# Patient Record
Sex: MALE | Race: WHITE | NOT HISPANIC OR LATINO | Employment: OTHER | URBAN - METROPOLITAN AREA
[De-identification: names, ages, dates, MRNs, and addresses within clinical notes are randomized per-mention and may not be internally consistent; named-entity substitution may affect disease eponyms.]

---

## 2017-01-16 RX ORDER — ROSUVASTATIN CALCIUM 20 MG/1
TABLET, FILM COATED ORAL
Qty: 30 TABLET | Refills: 0 | Status: SHIPPED | OUTPATIENT
Start: 2017-01-16 | End: 2017-02-14 | Stop reason: SDUPTHER

## 2017-01-28 RX ORDER — OMEPRAZOLE 20 MG/1
CAPSULE, DELAYED RELEASE ORAL
Qty: 30 CAPSULE | Refills: 3 | Status: SHIPPED | OUTPATIENT
Start: 2017-01-28 | End: 2017-09-14 | Stop reason: SDUPTHER

## 2017-02-02 ENCOUNTER — OFFICE VISIT (OUTPATIENT)
Dept: FAMILY MEDICINE CLINIC | Facility: CLINIC | Age: 57
End: 2017-02-02

## 2017-02-02 VITALS
HEIGHT: 73 IN | DIASTOLIC BLOOD PRESSURE: 80 MMHG | HEART RATE: 53 BPM | RESPIRATION RATE: 14 BRPM | SYSTOLIC BLOOD PRESSURE: 122 MMHG | WEIGHT: 176 LBS | OXYGEN SATURATION: 100 % | TEMPERATURE: 97.9 F | BODY MASS INDEX: 23.33 KG/M2

## 2017-02-02 DIAGNOSIS — Z13.9 SCREENING: ICD-10-CM

## 2017-02-02 DIAGNOSIS — K21.00 GASTROESOPHAGEAL REFLUX DISEASE WITH ESOPHAGITIS: ICD-10-CM

## 2017-02-02 DIAGNOSIS — Z12.11 ENCOUNTER FOR SCREENING COLONOSCOPY: ICD-10-CM

## 2017-02-02 DIAGNOSIS — K92.2 LOWER GI BLEED: ICD-10-CM

## 2017-02-02 DIAGNOSIS — E78.2 MIXED HYPERLIPIDEMIA: ICD-10-CM

## 2017-02-02 DIAGNOSIS — I10 ESSENTIAL HYPERTENSION: Primary | ICD-10-CM

## 2017-02-02 LAB
ALBUMIN SERPL-MCNC: 4.5 G/DL (ref 3.5–5.2)
ALBUMIN/GLOB SERPL: 1.8 G/DL
ALP SERPL-CCNC: 64 U/L (ref 39–117)
ALT SERPL W P-5'-P-CCNC: 25 U/L (ref 1–41)
ANION GAP SERPL CALCULATED.3IONS-SCNC: 12.7 MMOL/L
AST SERPL-CCNC: 22 U/L (ref 1–40)
BILIRUB SERPL-MCNC: 0.4 MG/DL (ref 0.1–1.2)
BUN BLD-MCNC: 22 MG/DL (ref 6–20)
BUN/CREAT SERPL: 30.1 (ref 7–25)
CALCIUM SPEC-SCNC: 9.3 MG/DL (ref 8.6–10.5)
CHLORIDE SERPL-SCNC: 102 MMOL/L (ref 98–107)
CHOLEST SERPL-MCNC: 133 MG/DL (ref 0–200)
CO2 SERPL-SCNC: 25.3 MMOL/L (ref 22–29)
CREAT BLD-MCNC: 0.73 MG/DL (ref 0.76–1.27)
ERYTHROCYTE [DISTWIDTH] IN BLOOD BY AUTOMATED COUNT: 12.6 % (ref 4.5–15)
GFR SERPL CREATININE-BSD FRML MDRD: 110 ML/MIN/1.73
GLOBULIN UR ELPH-MCNC: 2.5 GM/DL
GLUCOSE BLD-MCNC: 107 MG/DL (ref 65–99)
HCT VFR BLD AUTO: 42.2 % (ref 35–60)
HCV AB SER DONR QL: NORMAL
HDLC SERPL-MCNC: 59 MG/DL (ref 40–60)
HGB BLD-MCNC: 14.2 G/DL (ref 13.5–18)
LDLC SERPL CALC-MCNC: 64 MG/DL (ref 0–100)
LDLC/HDLC SERPL: 1.09 {RATIO}
LYMPHOCYTES # BLD AUTO: 2 10*3/MM3 (ref 1.2–3.4)
LYMPHOCYTES NFR BLD AUTO: 30.6 % (ref 21–51)
MCH RBC QN AUTO: 32.8 PG (ref 26.1–33.1)
MCHC RBC AUTO-ENTMCNC: 33.7 G/DL (ref 33–37)
MCV RBC AUTO: 97.5 FL (ref 80–99)
MONOCYTES # BLD AUTO: 0.6 10*3/MM3 (ref 0.1–0.6)
MONOCYTES NFR BLD AUTO: 8.7 % (ref 2–9)
NEUTROPHILS # BLD AUTO: 3.9 10*3/MM3 (ref 1.4–6.5)
NEUTROPHILS NFR BLD AUTO: 60.7 % (ref 42–75)
PLATELET # BLD AUTO: 172 10*3/MM3 (ref 150–450)
PMV BLD AUTO: 8 FL (ref 7.1–10.5)
POTASSIUM BLD-SCNC: 4.6 MMOL/L (ref 3.5–5.2)
PROT SERPL-MCNC: 7 G/DL (ref 6–8.5)
RBC # BLD AUTO: 4.33 10*6/MM3 (ref 4–6)
SODIUM BLD-SCNC: 140 MMOL/L (ref 136–145)
TRIGL SERPL-MCNC: 49 MG/DL (ref 0–150)
VLDLC SERPL-MCNC: 9.8 MG/DL (ref 5–40)
WBC NRBC COR # BLD: 6.5 10*3/MM3 (ref 4.5–10)

## 2017-02-02 PROCEDURE — 99214 OFFICE O/P EST MOD 30 MIN: CPT | Performed by: INTERNAL MEDICINE

## 2017-02-02 PROCEDURE — 85025 COMPLETE CBC W/AUTO DIFF WBC: CPT | Performed by: INTERNAL MEDICINE

## 2017-02-02 PROCEDURE — 86803 HEPATITIS C AB TEST: CPT | Performed by: INTERNAL MEDICINE

## 2017-02-02 PROCEDURE — 80053 COMPREHEN METABOLIC PANEL: CPT | Performed by: INTERNAL MEDICINE

## 2017-02-02 PROCEDURE — 80061 LIPID PANEL: CPT | Performed by: INTERNAL MEDICINE

## 2017-02-02 RX ORDER — LISINOPRIL 20 MG/1
20 TABLET ORAL DAILY
Qty: 90 TABLET | Refills: 1
Start: 2017-02-02 | End: 2018-04-17 | Stop reason: SDUPTHER

## 2017-02-02 NOTE — PROGRESS NOTES
Subjective   Elia Walsh is a 56 y.o. male.     History of Present Illness   Patient was then for high blood pressure blood pressures been running 120s over 80s at home.  His lipid status remained stable medication and he needs a colonoscopy he does have episodic lower GI bleed and patient was set up with a surgeon for colonoscopy.  He had his labs drawn and will follow-up in one week.    Much of this encounter note is an electronic transcription/translation of spoken language to printed text.  The electronic translation of spoken language may permit erroneous, or at times, nonsensical words or phrases to be inadvertently transcribed.  Although I have reviewed the note for such errors, some may still exist.  The following portions of the patient's history were reviewed and updated as appropriate: allergies, current medications, past family history, past medical history, past social history, past surgical history and problem list.    Review of Systems   Constitutional: Negative for fatigue and fever.   HENT: Positive for congestion. Negative for trouble swallowing.    Eyes: Negative for discharge and visual disturbance.   Respiratory: Negative for choking and shortness of breath.    Cardiovascular: Negative for chest pain and palpitations.   Gastrointestinal: Negative for abdominal pain and blood in stool.   Endocrine: Negative.    Genitourinary: Negative for genital sores and hematuria.   Musculoskeletal: Negative for gait problem and joint swelling.   Skin: Negative for color change, pallor, rash and wound.   Allergic/Immunologic: Positive for environmental allergies. Negative for immunocompromised state.   Neurological: Negative for facial asymmetry and speech difficulty.   Psychiatric/Behavioral: Negative for hallucinations and suicidal ideas.       Objective   Physical Exam   Constitutional: He is oriented to person, place, and time. He appears well-developed and well-nourished.   HENT:   Head: Normocephalic.    Eyes: Conjunctivae are normal. Pupils are equal, round, and reactive to light.   Neck: Normal range of motion. Neck supple.   Cardiovascular: Normal rate, regular rhythm and normal heart sounds.    Pulmonary/Chest: Effort normal and breath sounds normal.   Abdominal: Soft. Bowel sounds are normal.   Musculoskeletal: Normal range of motion.   Neurological: He is alert and oriented to person, place, and time.   Skin: Skin is warm and dry.   Psychiatric: He has a normal mood and affect. His behavior is normal. Judgment and thought content normal.   Nursing note and vitals reviewed.      Assessment/Plan   Problems Addressed this Visit        Cardiovascular and Mediastinum    Essential hypertension - Primary    Relevant Medications    lisinopril (PRINIVIL,ZESTRIL) 20 MG tablet    Other Relevant Orders    CBC & Differential (Completed)    Comprehensive Metabolic Panel (Completed)    Lipid Panel (Completed)    CBC Auto Differential (Completed)    Mixed hyperlipidemia    Relevant Orders    CBC & Differential (Completed)    Comprehensive Metabolic Panel (Completed)    Lipid Panel (Completed)    CBC Auto Differential (Completed)       Digestive    Gastroesophageal reflux disease with esophagitis    Relevant Orders    CBC & Differential (Completed)    Comprehensive Metabolic Panel (Completed)    Lipid Panel (Completed)    CBC Auto Differential (Completed)      Other Visit Diagnoses     Encounter for screening colonoscopy        Relevant Orders    CBC & Differential (Completed)    Comprehensive Metabolic Panel (Completed)    Lipid Panel (Completed)    CBC Auto Differential (Completed)    Lower GI bleed        Relevant Orders    Ambulatory Referral to General Surgery    Screening        Relevant Orders    Hepatitis C Antibody

## 2017-02-14 ENCOUNTER — OFFICE VISIT (OUTPATIENT)
Dept: SURGERY | Facility: CLINIC | Age: 57
End: 2017-02-14

## 2017-02-14 VITALS
WEIGHT: 175.4 LBS | OXYGEN SATURATION: 99 % | DIASTOLIC BLOOD PRESSURE: 60 MMHG | BODY MASS INDEX: 23.25 KG/M2 | HEART RATE: 60 BPM | HEIGHT: 73 IN | SYSTOLIC BLOOD PRESSURE: 102 MMHG

## 2017-02-14 DIAGNOSIS — K64.9 HEMORRHOIDS, UNSPECIFIED HEMORRHOID TYPE: Primary | ICD-10-CM

## 2017-02-14 PROCEDURE — 99243 OFF/OP CNSLTJ NEW/EST LOW 30: CPT | Performed by: SURGERY

## 2017-02-14 RX ORDER — ROSUVASTATIN CALCIUM 20 MG/1
TABLET, FILM COATED ORAL
Qty: 30 TABLET | Refills: 0 | Status: SHIPPED | OUTPATIENT
Start: 2017-02-14 | End: 2017-03-15 | Stop reason: SDUPTHER

## 2017-02-14 NOTE — PROGRESS NOTES
Subjective   Elia Walsh is a 56 y.o. male who presents to the office in surgical consultation from Ghulam Colmenares MD for rectal bleeding.    History of Present Illness     The patient has a history of hemorrhoid disease and is status post hemorrhoidectomy.  At the time one column of hemorrhoids was not removed over concern that he may lose anal function.  He has done well but has developed rectal bleeding over the past several weeks.  The bleeding is characterized by blood on the toilet paper.  He knows that he has a fairly large hemorrhoid and attributes the bleeding to this hemorrhoid.  He has not had any abdominal pain.  He has not had any blood in his stool itself.  His appetite is good and his bowel function is normal.  His most recent colonoscopy was 4 years ago and he believes it was normal.    Review of Systems   Constitutional: Negative for activity change, appetite change, fatigue and fever.   HENT: Negative for sinus pressure and trouble swallowing.    Respiratory: Negative for chest tightness and shortness of breath.    Cardiovascular: Negative for chest pain and palpitations.   Gastrointestinal: Positive for anal bleeding. Negative for abdominal pain, blood in stool, constipation, diarrhea, nausea, rectal pain and vomiting.   Endocrine: Negative for cold intolerance and heat intolerance.   Genitourinary: Negative for dysuria and flank pain.   Neurological: Negative for dizziness and light-headedness.   Hematological: Negative for adenopathy. Does not bruise/bleed easily.   Psychiatric/Behavioral: Negative for agitation and confusion.     Past Medical History   Diagnosis Date   • DJD (degenerative joint disease)    • Gastritis    • GERD (gastroesophageal reflux disease)    • Hyperlipidemia    • Hypertension    • Rectal bleeding      Past Surgical History   Procedure Laterality Date   • Hernia repair       X2   • Hemorrhoidectomy     • Colonoscopy       2013   • Endoscopy  05/28/2015     With  biopsies, Al Haddad MD   • Colonoscopy  04/16/2013     Jeffrey Ascencio MD   • Colonoscopy  09/29/2006     Jeffrey Ascencio MD   • Endoscopy  09/29/2006     With biopsies, Jeffrey Ascencio MD     Family History   Problem Relation Age of Onset   • Hypertension Mother 54   • Aneurysm Father 70     Social History     Social History   • Marital status:      Spouse name: N/A   • Number of children: 4   • Years of education: N/A     Occupational History   • mailman      Social History Main Topics   • Smoking status: Former Smoker     Types: Cigarettes   • Smokeless tobacco: Never Used   • Alcohol use Yes      Comment: socially   • Drug use: Yes     Special: Marijuana   • Sexual activity: Defer     Other Topics Concern   • Not on file     Social History Narrative       Objective   Physical Exam   Constitutional: He is oriented to person, place, and time. He appears well-developed and well-nourished.  Non-toxic appearance.   HENT:   Head: Normocephalic and atraumatic.   Eyes: EOM are normal. No scleral icterus.   Neck: Normal range of motion. Neck supple.   Pulmonary/Chest: Effort normal. No respiratory distress.   Abdominal: Soft. Normal appearance. There is no tenderness.   Genitourinary:   Genitourinary Comments: Rectal: Normal sphincter tone, large hemorrhoid present with stigmata of recent bleeding.   Neurological: He is alert and oriented to person, place, and time.   Skin: Skin is warm and dry.   Psychiatric: He has a normal mood and affect. His behavior is normal. Judgment and thought content normal.       Assessment/Plan       The encounter diagnosis was Hemorrhoids, unspecified hemorrhoid type.    The patient has a large hemorrhoid present that is the likely source of his rectal bleeding.  This was discussed at length with him.  He was offered a hemorrhoidectomy but he would like to treat the area conservatively.  He will return to the office if he is unable to improve his symptoms.  He will need a  repeat colonoscopy in one year.

## 2017-02-21 ENCOUNTER — TELEPHONE (OUTPATIENT)
Dept: FAMILY MEDICINE CLINIC | Facility: CLINIC | Age: 57
End: 2017-02-21

## 2017-02-21 NOTE — TELEPHONE ENCOUNTER
Pt informed of labs and mailed him a copy      ----- Message from Ghulam Colmenares MD sent at 2/21/2017  4:52 PM EST -----  Contact: [phone]  Slightly elevated BUN/creatinine  ----- Message -----     From: Kayy Dumont MA     Sent: 2/21/2017   3:58 PM       To: Ghulam Colmenares MD        ----- Message -----     From: Coral Zaragoza     Sent: 2/21/2017   3:27 PM       To: Kayy Dumont MA    PT WANTS BW RESULTS MAILED TO HIM PLEASE

## 2017-03-16 RX ORDER — ROSUVASTATIN CALCIUM 20 MG/1
TABLET, FILM COATED ORAL
Qty: 30 TABLET | Refills: 0 | Status: SHIPPED | OUTPATIENT
Start: 2017-03-16 | End: 2017-04-15 | Stop reason: SDUPTHER

## 2017-04-15 RX ORDER — ROSUVASTATIN CALCIUM 20 MG/1
TABLET, COATED ORAL
Qty: 30 TABLET | Refills: 4 | Status: SHIPPED | OUTPATIENT
Start: 2017-04-15 | End: 2017-09-14 | Stop reason: SDUPTHER

## 2017-08-03 ENCOUNTER — OFFICE VISIT (OUTPATIENT)
Dept: FAMILY MEDICINE CLINIC | Facility: CLINIC | Age: 57
End: 2017-08-03

## 2017-08-03 VITALS
SYSTOLIC BLOOD PRESSURE: 144 MMHG | WEIGHT: 169 LBS | OXYGEN SATURATION: 98 % | HEART RATE: 64 BPM | DIASTOLIC BLOOD PRESSURE: 72 MMHG | TEMPERATURE: 98.7 F | BODY MASS INDEX: 22.4 KG/M2 | HEIGHT: 73 IN

## 2017-08-03 DIAGNOSIS — I10 ESSENTIAL HYPERTENSION: Primary | ICD-10-CM

## 2017-08-03 DIAGNOSIS — E78.2 MIXED HYPERLIPIDEMIA: ICD-10-CM

## 2017-08-03 DIAGNOSIS — K21.00 GASTROESOPHAGEAL REFLUX DISEASE WITH ESOPHAGITIS: ICD-10-CM

## 2017-08-03 LAB
ALBUMIN SERPL-MCNC: 4.7 G/DL (ref 3.5–5.2)
ALBUMIN/GLOB SERPL: 1.8 G/DL
ALP SERPL-CCNC: 61 U/L (ref 39–117)
ALT SERPL W P-5'-P-CCNC: 26 U/L (ref 1–41)
ANION GAP SERPL CALCULATED.3IONS-SCNC: 9.3 MMOL/L
AST SERPL-CCNC: 17 U/L (ref 1–40)
BILIRUB SERPL-MCNC: 0.5 MG/DL (ref 0.1–1.2)
BUN BLD-MCNC: 29 MG/DL (ref 6–20)
BUN/CREAT SERPL: 35.4 (ref 7–25)
CALCIUM SPEC-SCNC: 9.9 MG/DL (ref 8.6–10.5)
CHLORIDE SERPL-SCNC: 105 MMOL/L (ref 98–107)
CHOLEST SERPL-MCNC: 141 MG/DL (ref 0–200)
CO2 SERPL-SCNC: 25.7 MMOL/L (ref 22–29)
CREAT BLD-MCNC: 0.82 MG/DL (ref 0.76–1.27)
ERYTHROCYTE [DISTWIDTH] IN BLOOD BY AUTOMATED COUNT: 13.5 % (ref 4.5–15)
GFR SERPL CREATININE-BSD FRML MDRD: 96 ML/MIN/1.73
GLOBULIN UR ELPH-MCNC: 2.6 GM/DL
GLUCOSE BLD-MCNC: 114 MG/DL (ref 65–99)
HCT VFR BLD AUTO: 43.4 % (ref 35–60)
HDLC SERPL-MCNC: 57 MG/DL (ref 40–60)
HGB BLD-MCNC: 14.5 G/DL (ref 13.5–18)
LDLC SERPL CALC-MCNC: 72 MG/DL (ref 0–100)
LDLC/HDLC SERPL: 1.26 {RATIO}
LYMPHOCYTES # BLD AUTO: 1.9 10*3/MM3 (ref 1.2–3.4)
LYMPHOCYTES NFR BLD AUTO: 25.3 % (ref 21–51)
MCH RBC QN AUTO: 32.4 PG (ref 26.1–33.1)
MCHC RBC AUTO-ENTMCNC: 33.4 G/DL (ref 33–37)
MCV RBC AUTO: 97 FL (ref 80–99)
MONOCYTES # BLD AUTO: 0.3 10*3/MM3 (ref 0.1–0.6)
MONOCYTES NFR BLD AUTO: 4.3 % (ref 2–9)
NEUTROPHILS # BLD AUTO: 5.2 10*3/MM3 (ref 1.4–6.5)
NEUTROPHILS NFR BLD AUTO: 70.4 % (ref 42–75)
PLATELET # BLD AUTO: 193 10*3/MM3 (ref 150–450)
PMV BLD AUTO: 8.2 FL (ref 7.1–10.5)
POTASSIUM BLD-SCNC: 4.8 MMOL/L (ref 3.5–5.2)
PROT SERPL-MCNC: 7.3 G/DL (ref 6–8.5)
RBC # BLD AUTO: 4.47 10*6/MM3 (ref 4–6)
SODIUM BLD-SCNC: 140 MMOL/L (ref 136–145)
TRIGL SERPL-MCNC: 61 MG/DL (ref 0–150)
VLDLC SERPL-MCNC: 12.2 MG/DL (ref 5–40)
WBC NRBC COR # BLD: 7.4 10*3/MM3 (ref 4.5–10)

## 2017-08-03 PROCEDURE — 80061 LIPID PANEL: CPT | Performed by: INTERNAL MEDICINE

## 2017-08-03 PROCEDURE — 99214 OFFICE O/P EST MOD 30 MIN: CPT | Performed by: INTERNAL MEDICINE

## 2017-08-03 PROCEDURE — 85025 COMPLETE CBC W/AUTO DIFF WBC: CPT | Performed by: INTERNAL MEDICINE

## 2017-08-03 PROCEDURE — 80053 COMPREHEN METABOLIC PANEL: CPT | Performed by: INTERNAL MEDICINE

## 2017-08-03 PROCEDURE — 36415 COLL VENOUS BLD VENIPUNCTURE: CPT | Performed by: INTERNAL MEDICINE

## 2017-08-03 RX ORDER — LATANOPROST 50 UG/ML
SOLUTION/ DROPS OPHTHALMIC NIGHTLY
COMMUNITY
Start: 2017-06-21 | End: 2021-03-11

## 2017-08-03 NOTE — PROGRESS NOTES
Subjective   Elia Walsh is a 57 y.o. male.     History of Present Illness   Patient was seen today for hypertension.  Blood pressure at home was ranging 120s to 110s over 80s.  His lipid status been well-controlled with medication diet and exercise.  His reflux also been controlled with diet.  He is to continue to monitor blood pressure return to our clinic in 6 months.  She did have lab work and follow-up in 4 days.    Much of this encounter note is an electronic transcription/translation of spoken language to printed text.  The electronic translation of spoken language may permit erroneous, or at times, nonsensical words or phrases to be inadvertently transcribed.  Although I have reviewed the note for such errors, some may still exist.  The following portions of the patient's history were reviewed and updated as appropriate: allergies, current medications, past family history, past medical history, past social history, past surgical history and problem list.    Review of Systems   Constitutional: Negative for fatigue and fever.   HENT: Positive for congestion. Negative for trouble swallowing.    Eyes: Negative for discharge and visual disturbance.   Respiratory: Negative for choking and shortness of breath.    Cardiovascular: Negative for chest pain and palpitations.   Gastrointestinal: Negative for abdominal pain and blood in stool.   Endocrine: Negative.    Genitourinary: Negative for genital sores and hematuria.   Musculoskeletal: Negative for gait problem and joint swelling.   Skin: Negative for color change, pallor, rash and wound.   Allergic/Immunologic: Positive for environmental allergies. Negative for immunocompromised state.   Neurological: Negative for facial asymmetry and speech difficulty.   Psychiatric/Behavioral: Negative for hallucinations and suicidal ideas.       Objective   Physical Exam   Constitutional: He is oriented to person, place, and time. He appears well-developed and  well-nourished.   HENT:   Head: Normocephalic.   Eyes: Conjunctivae are normal. Pupils are equal, round, and reactive to light.   Neck: Normal range of motion. Neck supple.   Cardiovascular: Normal rate, regular rhythm and normal heart sounds.    Pulmonary/Chest: Effort normal and breath sounds normal.   Abdominal: Soft. Bowel sounds are normal.   Musculoskeletal: Normal range of motion.   Neurological: He is alert and oriented to person, place, and time.   Skin: Skin is warm and dry.   Psychiatric: He has a normal mood and affect. His behavior is normal. Judgment and thought content normal.   Nursing note and vitals reviewed.      Assessment/Plan   Problems Addressed this Visit        Cardiovascular and Mediastinum    Essential hypertension - Primary    Relevant Orders    CBC & Differential (Completed)    Comprehensive Metabolic Panel    Lipid Panel    CBC Auto Differential (Completed)    Mixed hyperlipidemia    Relevant Orders    CBC & Differential (Completed)    Comprehensive Metabolic Panel    Lipid Panel    CBC Auto Differential (Completed)       Digestive    Gastroesophageal reflux disease with esophagitis    Relevant Orders    CBC & Differential (Completed)    Comprehensive Metabolic Panel    Lipid Panel    CBC Auto Differential (Completed)

## 2017-08-08 ENCOUNTER — TELEPHONE (OUTPATIENT)
Dept: FAMILY MEDICINE CLINIC | Facility: CLINIC | Age: 57
End: 2017-08-08

## 2017-08-15 RX ORDER — LISINOPRIL 40 MG/1
TABLET ORAL
Qty: 30 TABLET | Refills: 2 | Status: SHIPPED | OUTPATIENT
Start: 2017-08-15 | End: 2017-12-06 | Stop reason: DRUGHIGH

## 2017-09-14 RX ORDER — ROSUVASTATIN CALCIUM 20 MG/1
TABLET, FILM COATED ORAL
Qty: 30 TABLET | Refills: 3 | Status: SHIPPED | OUTPATIENT
Start: 2017-09-14 | End: 2018-01-09 | Stop reason: SDUPTHER

## 2017-09-14 RX ORDER — OMEPRAZOLE 20 MG/1
CAPSULE, DELAYED RELEASE ORAL
Qty: 30 CAPSULE | Refills: 2 | Status: SHIPPED | OUTPATIENT
Start: 2017-09-14 | End: 2018-03-26 | Stop reason: SDUPTHER

## 2017-10-08 ENCOUNTER — HOSPITAL ENCOUNTER (EMERGENCY)
Facility: HOSPITAL | Age: 57
Discharge: HOME | End: 2017-10-08
Attending: EMERGENCY MEDICINE | Admitting: EMERGENCY MEDICINE

## 2017-10-08 VITALS
OXYGEN SATURATION: 100 % | HEIGHT: 73 IN | BODY MASS INDEX: 22.53 KG/M2 | DIASTOLIC BLOOD PRESSURE: 97 MMHG | SYSTOLIC BLOOD PRESSURE: 163 MMHG | HEART RATE: 69 BPM | WEIGHT: 170 LBS | TEMPERATURE: 98.8 F | RESPIRATION RATE: 16 BRPM
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
## 2017-10-08 DIAGNOSIS — M70.80 REPETITIVE MOTION INJURY: ICD-10-CM

## 2017-10-08 DIAGNOSIS — S46.912A STRAIN OF LEFT SHOULDER, INITIAL ENCOUNTER: Primary | ICD-10-CM

## 2017-10-08 PROCEDURE — 25010000002 KETOROLAC TROMETHAMINE PER 15 MG: Performed by: NURSE PRACTITIONER

## 2017-10-08 PROCEDURE — 96372 THER/PROPH/DIAG INJ SC/IM: CPT

## 2017-10-08 PROCEDURE — 99283 EMERGENCY DEPT VISIT LOW MDM: CPT

## 2017-10-08 RX ORDER — KETOROLAC TROMETHAMINE 30 MG/ML
30 INJECTION, SOLUTION INTRAMUSCULAR; INTRAVENOUS ONCE
Status: COMPLETED | OUTPATIENT
Start: 2017-10-08 | End: 2017-10-08

## 2017-10-08 RX ORDER — HYDROCODONE BITARTRATE AND ACETAMINOPHEN 5; 325 MG/1; MG/1
1 TABLET ORAL EVERY 6 HOURS PRN
Qty: 12 TABLET | Refills: 0 | Status: SHIPPED | OUTPATIENT
Start: 2017-10-08 | End: 2017-12-04

## 2017-10-08 RX ADMIN — KETOROLAC TROMETHAMINE 30 MG: 30 INJECTION, SOLUTION INTRAMUSCULAR at 15:10

## 2017-10-08 NOTE — DISCHARGE INSTRUCTIONS
Pt instructions:  Rest  Follow up with Dr. Rubio this week for further care and out patient MRI scan  Follow up with Primary Care Doctor for further management and to have your blood pressure rechecked.   Return to ER with pain, swelling, numbness/tingling, fever, chills, weakness, nausea, vomiting, diarrhea, abdominal pain, back pain, urinary concerns, chest pain, shortness of breath, dizziness, headache, worsening of symptoms or other concerns.

## 2017-10-08 NOTE — ED PROVIDER NOTES
The patient presents complaining of increased from chronic L shoulder pain s/p hitting golf balls yesterday. Pt states that he has seen Dr. Rubio in the past for a steroid injection with transient relief.     Physical Exam:   Back/extremities: L shoulder tenderness with passive and active ROM, no clavicle tenderness.     Plan to provide the pt pain control, and a f/u with Dr. Rubio for a MRI.    I supervised care provided by the midlevel provider.  We have discussed this patient's history, physical exam, and treatment plan.  I have reviewed the note and personally saw and examined the patient and agree with the plan of care.    Documentation assistance provided by dina Tam.  Information recorded by the dina was done at my direction and has been verified and validated by me.           Porfirio Tam  10/08/17 1506       Wes Benites MD  10/08/17 2108

## 2017-10-08 NOTE — ED PROVIDER NOTES
"EMERGENCY DEPARTMENT ENCOUNTER    CHIEF COMPLAINT  Chief Complaint: Shoulder Pain  History given by:Patient  History limited by:  Room Number: 04/04  PMD: Ghulam Colmenares MD  Orthopedist: Dr. Rubio    HPI:  Pt is a 57 y.o. male who presents with acute on chronic L shoulder pain that onset yesterday. He reports the pain may have onset after hitting golf balls or after attempting to swat an insect. He denies any pain of the neck or back. Pt denies any radiation to the arm. He denies any weakness or numbness. Pt has a hx of DJD and has previously had Cortizone injections by his orthopedist. He states the pain is worse with movement of arm.     Duration: 1 day  Timing: sudden  Location: L shoulder  Radiation:none  Quality:\"pain\"  Intensity/Severity:moderate  Progression:unchanged  Associated Symptoms:none  Aggravating Factors:movement  Alleviating Factors:none  Previous Episodes:hx of DJD  Treatment before arrival:none    PAST MEDICAL HISTORY  Active Ambulatory Problems     Diagnosis Date Noted   • Essential hypertension 02/02/2017   • Mixed hyperlipidemia 02/02/2017   • Gastroesophageal reflux disease with esophagitis 02/02/2017     Resolved Ambulatory Problems     Diagnosis Date Noted   • No Resolved Ambulatory Problems     Past Medical History:   Diagnosis Date   • DJD (degenerative joint disease)    • Gastritis    • GERD (gastroesophageal reflux disease)    • Hyperlipidemia    • Hypertension    • Rectal bleeding        PAST SURGICAL HISTORY  Past Surgical History:   Procedure Laterality Date   • COLONOSCOPY      2013   • COLONOSCOPY  04/16/2013    Jeffrey Ascencio MD   • COLONOSCOPY  09/29/2006    Jeffrey Ascencio MD   • ENDOSCOPY  05/28/2015    With biopsies, Al Haddad MD   • ENDOSCOPY  09/29/2006    With biopsies, Jeffrey Ascencio MD   • HEMORRHOIDECTOMY     • HERNIA REPAIR      X2       FAMILY HISTORY  Family History   Problem Relation Age of Onset   • Hypertension Mother 54   • Aneurysm Father 70 "       SOCIAL HISTORY  Social History     Social History   • Marital status:      Spouse name: N/A   • Number of children: 4   • Years of education: N/A     Occupational History   • mailman      Social History Main Topics   • Smoking status: Former Smoker     Types: Cigarettes   • Smokeless tobacco: Never Used   • Alcohol use Yes      Comment: socially   • Drug use: Yes     Special: Marijuana   • Sexual activity: Defer     Other Topics Concern   • Not on file     Social History Narrative         ALLERGIES  Review of patient's allergies indicates no known allergies.    REVIEW OF SYSTEMS  Review of Systems   Constitutional: Negative.  Negative for activity change, appetite change ( decreased), chills and fever.   HENT: Negative for congestion, ear pain, rhinorrhea, sinus pressure and sore throat.    Eyes: Negative.    Respiratory: Negative.  Negative for cough and shortness of breath.    Cardiovascular: Negative.  Negative for chest pain, palpitations and leg swelling ( pedal).   Gastrointestinal: Negative for abdominal pain, diarrhea, nausea and vomiting.   Endocrine: Negative.    Genitourinary: Negative.  Negative for decreased urine volume, difficulty urinating, dysuria, frequency and urgency.   Musculoskeletal: Positive for arthralgias (L shoulder). Negative for back pain and neck pain.   Skin: Negative.  Negative for rash.   Allergic/Immunologic: Negative.    Neurological: Negative.  Negative for dizziness, weakness, light-headedness, numbness and headaches.   Hematological: Negative.    Psychiatric/Behavioral: Negative.  The patient is not nervous/anxious.    All other systems reviewed and are negative.      PHYSICAL EXAM  ED Triage Vitals   Temp Heart Rate Resp BP SpO2   10/08/17 1417 10/08/17 1417 10/08/17 1417 10/08/17 1429 10/08/17 1417   98.8 °F (37.1 °C) 69 16 163/97 100 %      Temp src Heart Rate Source Patient Position BP Location FiO2 (%)   10/08/17 1417 -- -- -- --   Tympanic           Physical  "Exam   Constitutional: He is well-developed, well-nourished, and in no distress. No distress.   HENT:   Head: Normocephalic.   Mouth/Throat: Oropharynx is clear and moist and mucous membranes are normal.   Eyes: Pupils are equal, round, and reactive to light.   Neck: Normal range of motion.   Cardiovascular: Normal rate, regular rhythm and normal heart sounds.    Pulses:       Radial pulses are 2+ on the left side.   Pulmonary/Chest: Effort normal and breath sounds normal. He has no wheezes.   Abdominal: Soft. Bowel sounds are normal. There is no tenderness.   Musculoskeletal: He exhibits no edema.        Left shoulder: He exhibits decreased range of motion and pain. He exhibits no tenderness, no bony tenderness, no spasm and normal pulse.        Left elbow: Normal.        Left wrist: Normal.        Cervical back: Normal.        Left upper arm: Normal.        Left forearm: Normal.        Left hand: Normal.   Neurological: He is alert.   Skin: Skin is warm and dry. No rash noted.   Psychiatric: Mood, memory, affect and judgment normal.   Nursing note and vitals reviewed.          PROGRESS AND CONSULTS  1451 - Discussed with pt about plan to tx pain in the ED with Toradol and will discharge with Rx for Norco. Instructed the pt to follow-up with his orthopedist for further evaluation of his shoulder and possible MRI. Pt understands and agrees with plan. All concerns addressed.     1457 - Reviewed pt's history and workup with Dr. Benites.  After a bedside evaluation; Dr Benites agrees with the plan of care          MEDICATIONS GIVEN IN ER  Medications   ketorolac (TORADOL) injection 30 mg (30 mg Intramuscular Given 10/8/17 1510)       /97  Pulse 69  Temp 98.8 °F (37.1 °C) (Tympanic)   Resp 16  Ht 73\" (185.4 cm)  Wt 170 lb (77.1 kg)  SpO2 100%  BMI 22.43 kg/m2    Discussed all results and noted any abnormalities with patient.  Discussed absoute need to recheck abnormalities with orthopedist    Reviewed " implications of results, diagnosis, meds, responsibility to follow up, warning signs and symptoms of possible worsening, potential complications and reasons to return to ER with patient    Discussed plan for discharge, as there is no emergent indication for admission.  Pt is agreeable and understands need for follow up and repeat testing.  Pt is aware that discharge does not mean that nothing is wrong but it indicates no emergency is present and they must continue care with orthopedist.    DIAGNOSIS  Final diagnoses:   Strain of left shoulder, initial encounter   Repetitive motion injury       FOLLOW UP   Jame Rubio MD  [address] [facility] [address]  [place] [address]  [phone]    Call in 1 day  For orthopedist follow-up      RX     Medication List      New Prescriptions          HYDROcodone-acetaminophen 5-325 MG per tablet   Commonly known as:  NORCO   Take 1 tablet by mouth Every 6 (Six) Hours As Needed for Severe Pain .         Changed          lisinopril 40 MG tablet   Commonly known as:  PRINIVIL,ZESTRIL   TAKE ONE TABLET BY MOUTH DAILY   What changed:  See the new instructions.           Lan report [id number] reviewed.  Risks, benefits, alternatives discussed with patient.  Pt consents to treatment and agrees to follow up with PMD tomorrow for further care and any other prescriptions.     I personally reviewed the past medical history, past surgical history, social history, family history, current medications and allergies as they appear in this chart.  The scribe's note accurately reflects the work and decisions made by me.         Documentation assistance provided by dina Boston for TERA Recinos.  Information recorded by the scribe was done at my direction and has been verified and validated by me.     Shiv Boston  10/08/17 1501       Shiv Boston  10/08/17 1536       TERA Bhandari  10/08/17 1107

## 2017-10-11 ENCOUNTER — TELEPHONE (OUTPATIENT)
Dept: SOCIAL WORK | Facility: HOSPITAL | Age: 57
End: 2017-10-11

## 2017-10-11 NOTE — TELEPHONE ENCOUNTER
Spoke with pt today in f/u and he states that his shoulder is better and he did f/u with Dr. Rubio and has a MRI ordered for tomorrow. He was able to get his Norco filled and is taking them as directed.  Pt states that he has had his BP checked twice since DC and it is back to normal. No other questions or concerns voiced by pt at this time. Mary Kay FLOREZ

## 2017-12-04 ENCOUNTER — OFFICE VISIT (OUTPATIENT)
Dept: RETAIL CLINIC | Facility: CLINIC | Age: 57
End: 2017-12-04

## 2017-12-04 VITALS
RESPIRATION RATE: 18 BRPM | SYSTOLIC BLOOD PRESSURE: 144 MMHG | OXYGEN SATURATION: 98 % | HEART RATE: 70 BPM | TEMPERATURE: 98.9 F | DIASTOLIC BLOOD PRESSURE: 82 MMHG

## 2017-12-04 DIAGNOSIS — J01.00 ACUTE NON-RECURRENT MAXILLARY SINUSITIS: Primary | ICD-10-CM

## 2017-12-04 PROCEDURE — 99213 OFFICE O/P EST LOW 20 MIN: CPT | Performed by: NURSE PRACTITIONER

## 2017-12-04 RX ORDER — GUAIFENESIN 600 MG/1
600 TABLET, EXTENDED RELEASE ORAL 2 TIMES DAILY
Start: 2017-12-04 | End: 2017-12-06 | Stop reason: SDUPTHER

## 2017-12-04 RX ORDER — FLUTICASONE PROPIONATE 50 MCG
1 SPRAY, SUSPENSION (ML) NASAL 2 TIMES DAILY
Start: 2017-12-04 | End: 2019-10-16 | Stop reason: SDUPTHER

## 2017-12-04 NOTE — PROGRESS NOTES
Subjective   Elia Walsh is a 57 y.o. male.     Headache    This is a new problem. Episode onset: 2 days ago. The problem occurs constantly. The problem has been waxing and waning. The pain is located in the bilateral, temporal and retro-orbital region. The pain does not radiate. The pain quality is similar to prior headaches. The quality of the pain is described as aching. The pain is at a severity of 3/10. The pain is mild. Associated symptoms include drainage, muscle aches, nausea and sinus pressure. Pertinent negatives include no abdominal pain, anorexia, coughing, dizziness, ear pain, eye pain, eye redness, eye watering, facial sweating, fever, hearing loss, insomnia, loss of balance, neck pain, numbness, phonophobia, photophobia, rhinorrhea, scalp tenderness, seizures, sore throat, swollen glands, tingling, visual change, vomiting or weakness. Nothing aggravates the symptoms. He has tried NSAIDs for the symptoms. The treatment provided mild relief. His past medical history is significant for hypertension. There is no history of cancer, cluster headaches, immunosuppression, migraine headaches, obesity, recent head traumas, sinus disease or TMJ.       The following portions of the patient's history were reviewed and updated as appropriate: allergies, current medications, past family history, past medical history, past social history, past surgical history and problem list.    Review of Systems   Constitutional: Positive for appetite change (diminished) and fatigue. Negative for chills, diaphoresis and fever.   HENT: Positive for congestion, postnasal drip and sinus pressure. Negative for ear discharge, ear pain, facial swelling, hearing loss, mouth sores, nosebleeds, rhinorrhea, sinus pain, sneezing, sore throat, trouble swallowing and voice change.    Eyes: Negative for photophobia, pain, discharge, redness and itching.   Respiratory: Negative for cough, chest tightness, shortness of breath, wheezing and  stridor.    Cardiovascular: Negative for chest pain and palpitations.   Gastrointestinal: Positive for nausea. Negative for abdominal pain, anorexia, constipation, diarrhea and vomiting.   Genitourinary: Negative for decreased urine volume.   Musculoskeletal: Negative for myalgias, neck pain and neck stiffness.   Skin: Negative for rash.   Allergic/Immunologic: Negative for environmental allergies and immunocompromised state.   Neurological: Positive for headaches. Negative for dizziness, tingling, seizures, syncope, weakness, numbness and loss of balance.   Psychiatric/Behavioral: The patient does not have insomnia.        Objective   Physical Exam   Constitutional: He is oriented to person, place, and time. He appears well-developed and well-nourished. He is cooperative.  Non-toxic appearance. He does not appear ill. No distress.   HENT:   Right Ear: Hearing, external ear and ear canal normal. Tympanic membrane is not perforated, not erythematous, not retracted and not bulging. A middle ear effusion is present.   Left Ear: Hearing, external ear and ear canal normal. Tympanic membrane is not perforated, not erythematous, not retracted and not bulging. A middle ear effusion is present.   Nose: Mucosal edema present. No rhinorrhea or sinus tenderness. Right sinus exhibits maxillary sinus tenderness (mild). Right sinus exhibits no frontal sinus tenderness. Left sinus exhibits maxillary sinus tenderness (mild). Left sinus exhibits no frontal sinus tenderness.   Mouth/Throat: Uvula is midline, oropharynx is clear and moist and mucous membranes are normal. No oral lesions. No oropharyngeal exudate, posterior oropharyngeal edema or posterior oropharyngeal erythema. Tonsils are 2+ on the right. Tonsils are 2+ on the left. No tonsillar exudate.   Eyes: Conjunctivae, EOM and lids are normal. Pupils are equal, round, and reactive to light.   Cardiovascular: Normal rate, regular rhythm, S1 normal and S2 normal.     Pulmonary/Chest: Effort normal and breath sounds normal.   Abdominal: Bowel sounds are normal. There is no tenderness.   Lymphadenopathy:     He has no cervical adenopathy.   Neurological: He is alert and oriented to person, place, and time.   Skin: Skin is warm and dry. He is not diaphoretic.   Vitals reviewed.      Assessment/Plan   Elia was seen today for headache.    Diagnoses and all orders for this visit:    Acute non-recurrent maxillary sinusitis  -     Chlorpheniramine-Acetaminophen 2-325 MG tablet; Take 1 tablet by mouth 4 (Four) Times a Day As Needed (drainage, aches).  -     guaiFENesin (MUCINEX) 600 MG 12 hr tablet; Take 1 tablet by mouth 2 (Two) Times a Day.  -     fluticasone (FLONASE) 50 MCG/ACT nasal spray; 1 spray into each nostril 2 (Two) Times a Day.          -     Increase H2O intake         -     Follow up with PCP or urgent treatment for persistent or worsening symptoms

## 2017-12-06 ENCOUNTER — OFFICE VISIT (OUTPATIENT)
Dept: FAMILY MEDICINE CLINIC | Facility: CLINIC | Age: 57
End: 2017-12-06

## 2017-12-06 VITALS
WEIGHT: 172.2 LBS | BODY MASS INDEX: 22.82 KG/M2 | HEIGHT: 73 IN | TEMPERATURE: 98.6 F | HEART RATE: 64 BPM | RESPIRATION RATE: 16 BRPM | DIASTOLIC BLOOD PRESSURE: 70 MMHG | OXYGEN SATURATION: 98 % | SYSTOLIC BLOOD PRESSURE: 110 MMHG

## 2017-12-06 DIAGNOSIS — J34.89 SINUS PRESSURE: ICD-10-CM

## 2017-12-06 DIAGNOSIS — J01.00 ACUTE NON-RECURRENT MAXILLARY SINUSITIS: ICD-10-CM

## 2017-12-06 DIAGNOSIS — R68.89 FLU-LIKE SYMPTOMS: ICD-10-CM

## 2017-12-06 DIAGNOSIS — J01.10 ACUTE FRONTAL SINUSITIS, RECURRENCE NOT SPECIFIED: ICD-10-CM

## 2017-12-06 DIAGNOSIS — R51.9 ACUTE NONINTRACTABLE HEADACHE, UNSPECIFIED HEADACHE TYPE: Primary | ICD-10-CM

## 2017-12-06 LAB
ERYTHROCYTE [DISTWIDTH] IN BLOOD BY AUTOMATED COUNT: 13.8 % (ref 4.5–15)
FLUAV AG NPH QL: NEGATIVE
FLUBV AG NPH QL IA: NEGATIVE
HCT VFR BLD AUTO: 43.4 % (ref 35–60)
HGB BLD-MCNC: 14.1 G/DL (ref 13.5–18)
LYMPHOCYTES # BLD AUTO: 1.8 10*3/MM3 (ref 1.2–3.4)
LYMPHOCYTES NFR BLD AUTO: 22.1 % (ref 21–51)
MCH RBC QN AUTO: 31.9 PG (ref 26.1–33.1)
MCHC RBC AUTO-ENTMCNC: 32.5 G/DL (ref 33–37)
MCV RBC AUTO: 98 FL (ref 80–99)
MONOCYTES # BLD AUTO: 0.6 10*3/MM3 (ref 0.1–0.6)
MONOCYTES NFR BLD AUTO: 7.6 % (ref 2–9)
NEUTROPHILS # BLD AUTO: 5.6 10*3/MM3 (ref 1.4–6.5)
NEUTROPHILS NFR BLD AUTO: 70.3 % (ref 42–75)
PLATELET # BLD AUTO: 216 10*3/MM3 (ref 150–450)
PMV BLD AUTO: 8 FL (ref 7.1–10.5)
RBC # BLD AUTO: 4.43 10*6/MM3 (ref 4–6)
S PYO AG THROAT QL: NEGATIVE
WBC NRBC COR # BLD: 8 10*3/MM3 (ref 4.5–10)

## 2017-12-06 PROCEDURE — 87804 INFLUENZA ASSAY W/OPTIC: CPT | Performed by: NURSE PRACTITIONER

## 2017-12-06 PROCEDURE — 99213 OFFICE O/P EST LOW 20 MIN: CPT | Performed by: NURSE PRACTITIONER

## 2017-12-06 PROCEDURE — 87880 STREP A ASSAY W/OPTIC: CPT | Performed by: NURSE PRACTITIONER

## 2017-12-06 PROCEDURE — 36415 COLL VENOUS BLD VENIPUNCTURE: CPT | Performed by: NURSE PRACTITIONER

## 2017-12-06 PROCEDURE — 85025 COMPLETE CBC W/AUTO DIFF WBC: CPT | Performed by: NURSE PRACTITIONER

## 2017-12-06 RX ORDER — TAMSULOSIN HYDROCHLORIDE 0.4 MG/1
1 CAPSULE ORAL EVERY OTHER DAY
COMMUNITY
Start: 2017-11-12

## 2017-12-06 RX ORDER — AMOXICILLIN 875 MG/1
875 TABLET, COATED ORAL 2 TIMES DAILY
Qty: 14 TABLET | Refills: 0 | Status: SHIPPED | OUTPATIENT
Start: 2017-12-06 | End: 2017-12-13

## 2017-12-06 RX ORDER — GUAIFENESIN 600 MG/1
600 TABLET, EXTENDED RELEASE ORAL 2 TIMES DAILY
Start: 2017-12-06 | End: 2020-09-10

## 2017-12-06 NOTE — PROGRESS NOTES
Subjective   Elia Walsh is a 57 y.o. male.     History of Present Illness   C/o sinus pressure and HA, nausea chills fever, he starts symptoms started about 5 days ago, he did vomit x1, he states he is able to eat now, he has HA, he has a hx of migraines, he tried advil, he tried advil sinus but did not help, he did try flonase which has helped. He went to [facility] on 12/4/17, given chlorpheniramine- tylenol, mucinex, and flonase, he did not try mucinex. He states he has not been at work this week, he denies diarrhea, he states fever was low grade 99.0, he denies nasal drainage, he has a slight cough, he did not have flu vaccine, denies being around any one sick. He denies seasonal allergies. He denies sore throat. He denies ear pain. He denies photophobia, he denies phonophobia, he does have some nausea, he states the HA is constant, he denies confusion or weakness, he has been fatigued.     The following portions of the patient's history were reviewed and updated as appropriate: allergies, current medications, past family history, past medical history, past social history, past surgical history and problem list.    Review of Systems   Constitutional: Negative for chills, diaphoresis and fever.   HENT: Positive for congestion and sinus pressure. Negative for ear pain, postnasal drip, rhinorrhea and sore throat.    Eyes: Negative for photophobia, pain and visual disturbance.   Respiratory: Positive for cough. Negative for shortness of breath and wheezing.    Cardiovascular: Negative for chest pain.   Musculoskeletal: Negative for arthralgias and myalgias.   Skin: Negative for pallor.   Allergic/Immunologic: Positive for environmental allergies.   Neurological: Positive for headaches. Negative for dizziness, weakness, light-headedness and numbness.   All other systems reviewed and are negative.      Objective   Physical Exam   Constitutional: He is oriented to person, place, and time. He appears well-developed  and well-nourished.   HENT:   Head: Normocephalic.   Right Ear: Tympanic membrane and ear canal normal.   Left Ear: Tympanic membrane and ear canal normal.   Nose: Mucosal edema present. Right sinus exhibits maxillary sinus tenderness. Left sinus exhibits maxillary sinus tenderness.   Mouth/Throat: Uvula is midline and mucous membranes are normal. Posterior oropharyngeal erythema present.   Eyes: Pupils are equal, round, and reactive to light.   Neck: Neck supple.   Cardiovascular: Normal rate, regular rhythm and normal heart sounds.    Pulses:       Radial pulses are 2+ on the right side, and 2+ on the left side.        Dorsalis pedis pulses are 2+ on the right side, and 2+ on the left side.        Posterior tibial pulses are 2+ on the right side, and 2+ on the left side.   Pulmonary/Chest: Effort normal and breath sounds normal. He has no decreased breath sounds. He has no wheezes. He has no rhonchi.   Musculoskeletal: Normal range of motion.   Neurological: He is alert and oriented to person, place, and time. He has normal strength. No cranial nerve deficit or sensory deficit. He displays a negative Romberg sign.   Skin: Skin is warm and dry.   Psychiatric: He has a normal mood and affect. His behavior is normal. Judgment and thought content normal.   Nursing note and vitals reviewed.      Assessment/Plan   Elia was seen today for uri.    Diagnoses and all orders for this visit:    Acute nonintractable headache, unspecified headache type  -     Influenza Antigen, Rapid - Swab, Nasopharynx  -     CBC & Differential  -     Rapid Strep A Screen - Swab, Throat  -     CBC Auto Differential    Sinus pressure  -     Influenza Antigen, Rapid - Swab, Nasopharynx  -     CBC & Differential  -     Rapid Strep A Screen - Swab, Throat  -     CBC Auto Differential    Flu-like symptoms  -     Influenza Antigen, Rapid - Swab, Nasopharynx  -     CBC & Differential  -     Rapid Strep A Screen - Swab, Throat  -     CBC Auto  Differential      Strep and flu today, negative.   Cbc today, WNL.  Start amoxicillin j875mg 2x day for 7 days.   May cont mucinex OTC as needed for cough.  Cont flonase 2 sprays each nostril daily.   Cont tylenol as needed for aches or chills.   If HA persists or symptoms persist 5-7 days call office, if HA worsens call office or if severe ER.   Increase fluid intake, get plenty of rest.   Patient agrees with plan of care and understands instructions. Call if worsening symptoms or any problems or concerns.

## 2017-12-06 NOTE — PATIENT INSTRUCTIONS
Strep and flu today, negative.   Cbc today, WNL>  Start amoxicillin j875mg 2x day for 7 days.   May cont mucinex OTC as needed for cough.  Cont flonase 2 sprays each nostril daily.   Cont tylenol as needed for aches or chills.   If HA persists or symptoms persist 5-7 days call office, if HA worsens call office or if severe ER.   Increase fluid intake, get plenty of rest.   Patient agrees with plan of care and understands instructions. Call if worsening symptoms or any problems or concerns.

## 2018-01-09 RX ORDER — ROSUVASTATIN CALCIUM 20 MG/1
TABLET, FILM COATED ORAL
Qty: 30 TABLET | Refills: 2 | Status: SHIPPED | OUTPATIENT
Start: 2018-01-09 | End: 2018-04-12 | Stop reason: SDUPTHER

## 2018-02-01 ENCOUNTER — OFFICE VISIT (OUTPATIENT)
Dept: FAMILY MEDICINE CLINIC | Facility: CLINIC | Age: 58
End: 2018-02-01

## 2018-02-01 VITALS
SYSTOLIC BLOOD PRESSURE: 118 MMHG | HEIGHT: 73 IN | OXYGEN SATURATION: 99 % | WEIGHT: 177 LBS | TEMPERATURE: 98.9 F | BODY MASS INDEX: 23.46 KG/M2 | DIASTOLIC BLOOD PRESSURE: 68 MMHG | HEART RATE: 68 BPM

## 2018-02-01 DIAGNOSIS — I10 ESSENTIAL HYPERTENSION: Primary | ICD-10-CM

## 2018-02-01 DIAGNOSIS — K21.00 GASTROESOPHAGEAL REFLUX DISEASE WITH ESOPHAGITIS: ICD-10-CM

## 2018-02-01 DIAGNOSIS — E78.2 MIXED HYPERLIPIDEMIA: ICD-10-CM

## 2018-02-01 LAB
25(OH)D3 SERPL-MCNC: 42.1 NG/ML (ref 30–100)
ALBUMIN SERPL-MCNC: 4.6 G/DL (ref 3.5–5.2)
ALBUMIN/GLOB SERPL: 1.8 G/DL
ALP SERPL-CCNC: 59 U/L (ref 39–117)
ALT SERPL W P-5'-P-CCNC: 27 U/L (ref 1–41)
ANION GAP SERPL CALCULATED.3IONS-SCNC: 10.1 MMOL/L
AST SERPL-CCNC: 23 U/L (ref 1–40)
BILIRUB SERPL-MCNC: 0.7 MG/DL (ref 0.1–1.2)
BUN BLD-MCNC: 22 MG/DL (ref 6–20)
BUN/CREAT SERPL: 23.9 (ref 7–25)
CALCIUM SPEC-SCNC: 9.3 MG/DL (ref 8.6–10.5)
CHLORIDE SERPL-SCNC: 100 MMOL/L (ref 98–107)
CHOLEST SERPL-MCNC: 142 MG/DL (ref 0–200)
CO2 SERPL-SCNC: 26.9 MMOL/L (ref 22–29)
CREAT BLD-MCNC: 0.92 MG/DL (ref 0.76–1.27)
ERYTHROCYTE [DISTWIDTH] IN BLOOD BY AUTOMATED COUNT: 13.5 % (ref 4.5–15)
GFR SERPL CREATININE-BSD FRML MDRD: 84 ML/MIN/1.73
GLOBULIN UR ELPH-MCNC: 2.6 GM/DL
GLUCOSE BLD-MCNC: 93 MG/DL (ref 65–99)
HCT VFR BLD AUTO: 42.9 % (ref 35–60)
HDLC SERPL-MCNC: 62 MG/DL (ref 40–60)
HGB BLD-MCNC: 14.1 G/DL (ref 13.5–18)
LDLC SERPL CALC-MCNC: 70 MG/DL (ref 0–100)
LDLC/HDLC SERPL: 1.14 {RATIO}
LYMPHOCYTES # BLD AUTO: 2 10*3/MM3 (ref 1.2–3.4)
LYMPHOCYTES NFR BLD AUTO: 28 % (ref 21–51)
MCH RBC QN AUTO: 32.4 PG (ref 26.1–33.1)
MCHC RBC AUTO-ENTMCNC: 32.9 G/DL (ref 33–37)
MCV RBC AUTO: 98.6 FL (ref 80–99)
MONOCYTES # BLD AUTO: 0.3 10*3/MM3 (ref 0.1–0.6)
MONOCYTES NFR BLD AUTO: 4.3 % (ref 2–9)
NEUTROPHILS # BLD AUTO: 4.9 10*3/MM3 (ref 1.4–6.5)
NEUTROPHILS NFR BLD AUTO: 67.7 % (ref 42–75)
PLATELET # BLD AUTO: 218 10*3/MM3 (ref 150–450)
PMV BLD AUTO: 8.2 FL (ref 7.1–10.5)
POTASSIUM BLD-SCNC: 4.9 MMOL/L (ref 3.5–5.2)
PROT SERPL-MCNC: 7.2 G/DL (ref 6–8.5)
RBC # BLD AUTO: 4.35 10*6/MM3 (ref 4–6)
SODIUM BLD-SCNC: 137 MMOL/L (ref 136–145)
TRIGL SERPL-MCNC: 48 MG/DL (ref 0–150)
VLDLC SERPL-MCNC: 9.6 MG/DL (ref 5–40)
WBC NRBC COR # BLD: 7.3 10*3/MM3 (ref 4.5–10)

## 2018-02-01 PROCEDURE — 80053 COMPREHEN METABOLIC PANEL: CPT | Performed by: INTERNAL MEDICINE

## 2018-02-01 PROCEDURE — 85025 COMPLETE CBC W/AUTO DIFF WBC: CPT | Performed by: INTERNAL MEDICINE

## 2018-02-01 PROCEDURE — 99214 OFFICE O/P EST MOD 30 MIN: CPT | Performed by: INTERNAL MEDICINE

## 2018-02-01 PROCEDURE — 82306 VITAMIN D 25 HYDROXY: CPT | Performed by: INTERNAL MEDICINE

## 2018-02-01 PROCEDURE — 36415 COLL VENOUS BLD VENIPUNCTURE: CPT | Performed by: INTERNAL MEDICINE

## 2018-02-01 PROCEDURE — 80061 LIPID PANEL: CPT | Performed by: INTERNAL MEDICINE

## 2018-02-01 NOTE — PROGRESS NOTES
Subjective   Elia Walsh is a 57 y.o. male.     History of Present Illness   Patient was seen today for hypertension.  Blood pressure is running 120s over 80s.  His lipids a been controlled with diet and medication.  His gastroesophageal reflux is controlled with PPIs.  He will continue to monitor his blood pressure and follow-up in 6 months.  Patient will have labs before return to clinic.    Much of this encounter note is an electronic transcription/translation of spoken language to printed text.  The electronic translation of spoken language may permit erroneous, or at times, nonsensical words or phrases to be inadvertently transcribed.  Although I have reviewed the note for such errors, some may still exist.  The following portions of the patient's history were reviewed and updated as appropriate: allergies, current medications, past family history, past medical history, past social history, past surgical history and problem list.    Review of Systems   Constitutional: Negative for fatigue and fever.   HENT: Positive for congestion. Negative for trouble swallowing.    Eyes: Negative for discharge and visual disturbance.   Respiratory: Negative for choking and shortness of breath.    Cardiovascular: Negative for chest pain and palpitations.   Gastrointestinal: Negative for abdominal pain and blood in stool.   Endocrine: Negative.    Genitourinary: Negative for genital sores and hematuria.   Musculoskeletal: Negative for gait problem and joint swelling.   Skin: Negative for color change, pallor, rash and wound.   Allergic/Immunologic: Positive for environmental allergies. Negative for immunocompromised state.   Neurological: Negative for facial asymmetry and speech difficulty.   Psychiatric/Behavioral: Negative for hallucinations and suicidal ideas.       Objective   Physical Exam   Constitutional: He is oriented to person, place, and time. He appears well-developed and well-nourished.   HENT:   Head:  Normocephalic.   Eyes: Conjunctivae are normal. Pupils are equal, round, and reactive to light.   Neck: Normal range of motion. Neck supple.   Cardiovascular: Normal rate, regular rhythm and normal heart sounds.    Pulmonary/Chest: Effort normal and breath sounds normal.   Abdominal: Soft. Bowel sounds are normal.   Musculoskeletal: Normal range of motion.   Neurological: He is alert and oriented to person, place, and time.   Skin: Skin is warm and dry.   Psychiatric: He has a normal mood and affect. His behavior is normal. Judgment and thought content normal.   Nursing note and vitals reviewed.      Assessment/Plan   Problems Addressed this Visit        Cardiovascular and Mediastinum    Essential hypertension - Primary    Relevant Orders    CBC & Differential (Completed)    Comprehensive Metabolic Panel    Lipid Panel    Vitamin D 25 Hydroxy    CBC Auto Differential (Completed)    Mixed hyperlipidemia    Relevant Orders    CBC & Differential (Completed)    Comprehensive Metabolic Panel    Lipid Panel    Vitamin D 25 Hydroxy    CBC Auto Differential (Completed)       Digestive    Gastroesophageal reflux disease with esophagitis    Relevant Orders    CBC & Differential (Completed)    Comprehensive Metabolic Panel    Lipid Panel    Vitamin D 25 Hydroxy    CBC Auto Differential (Completed)

## 2018-02-16 ENCOUNTER — PREP FOR SURGERY (OUTPATIENT)
Dept: OTHER | Facility: HOSPITAL | Age: 58
End: 2018-02-16

## 2018-02-16 ENCOUNTER — TELEPHONE (OUTPATIENT)
Dept: FAMILY MEDICINE CLINIC | Facility: CLINIC | Age: 58
End: 2018-02-16

## 2018-02-16 DIAGNOSIS — Z86.010 HX OF COLONIC POLYPS: Primary | ICD-10-CM

## 2018-02-16 RX ORDER — LISINOPRIL 40 MG/1
TABLET ORAL
Qty: 30 TABLET | Refills: 1 | Status: SHIPPED | OUTPATIENT
Start: 2018-02-16 | End: 2018-04-17 | Stop reason: DRUGHIGH

## 2018-03-08 ENCOUNTER — OUTSIDE FACILITY SERVICE (OUTPATIENT)
Dept: GASTROENTEROLOGY | Facility: CLINIC | Age: 58
End: 2018-03-08

## 2018-03-08 PROCEDURE — 45385 COLONOSCOPY W/LESION REMOVAL: CPT | Performed by: INTERNAL MEDICINE

## 2018-03-13 PROBLEM — D12.6 TUBULAR ADENOMA OF COLON: Status: ACTIVE | Noted: 2018-03-13

## 2018-03-13 PROBLEM — K64.5 PERIANAL VENOUS THROMBOSIS: Status: ACTIVE | Noted: 2018-03-13

## 2018-03-16 ENCOUNTER — TELEPHONE (OUTPATIENT)
Dept: GASTROENTEROLOGY | Facility: CLINIC | Age: 58
End: 2018-03-16

## 2018-03-16 NOTE — TELEPHONE ENCOUNTER
Called pt and advised per Dr Haddad that his colon polyps were not cancerous, but one was precancerous.  He recommends a repeat c/s in 3 yrs and f/u sooner if needed. Pt verb understanding.     C/s placed in recall for 03/08/2021.

## 2018-03-16 NOTE — TELEPHONE ENCOUNTER
----- Message from Al WADE MD sent at 3/13/2018  4:44 PM EDT -----  Regarding: Biopsy results  Okay to call results, recommend follow-up colonoscopy in 3 years, office follow up sooner as needed  ----- Message -----  From: Interface, Scans Incoming  Sent: 3/13/2018  12:56 PM  To: Al WADE MD

## 2018-03-26 RX ORDER — OMEPRAZOLE 20 MG/1
CAPSULE, DELAYED RELEASE ORAL
Qty: 30 CAPSULE | Refills: 3 | Status: SHIPPED | OUTPATIENT
Start: 2018-03-26 | End: 2018-05-23

## 2018-04-05 ENCOUNTER — LAB (OUTPATIENT)
Dept: FAMILY MEDICINE CLINIC | Facility: CLINIC | Age: 58
End: 2018-04-05

## 2018-04-05 DIAGNOSIS — R79.9 ELEVATED BUN: Primary | ICD-10-CM

## 2018-04-05 LAB
ANION GAP SERPL CALCULATED.3IONS-SCNC: 10.5 MMOL/L
BUN BLD-MCNC: 19 MG/DL (ref 6–20)
BUN/CREAT SERPL: 22.1 (ref 7–25)
CALCIUM SPEC-SCNC: 9.5 MG/DL (ref 8.6–10.5)
CHLORIDE SERPL-SCNC: 103 MMOL/L (ref 98–107)
CO2 SERPL-SCNC: 26.5 MMOL/L (ref 22–29)
CREAT BLD-MCNC: 0.86 MG/DL (ref 0.76–1.27)
GFR SERPL CREATININE-BSD FRML MDRD: 91 ML/MIN/1.73
GLUCOSE BLD-MCNC: 104 MG/DL (ref 65–99)
POTASSIUM BLD-SCNC: 4.5 MMOL/L (ref 3.5–5.2)
SODIUM BLD-SCNC: 140 MMOL/L (ref 136–145)

## 2018-04-05 PROCEDURE — 36415 COLL VENOUS BLD VENIPUNCTURE: CPT | Performed by: INTERNAL MEDICINE

## 2018-04-05 PROCEDURE — 80048 BASIC METABOLIC PNL TOTAL CA: CPT | Performed by: INTERNAL MEDICINE

## 2018-04-12 RX ORDER — ROSUVASTATIN CALCIUM 20 MG/1
TABLET, COATED ORAL
Qty: 30 TABLET | Refills: 1 | Status: SHIPPED | OUTPATIENT
Start: 2018-04-12 | End: 2018-05-03 | Stop reason: SDUPTHER

## 2018-04-17 ENCOUNTER — TELEPHONE (OUTPATIENT)
Dept: FAMILY MEDICINE CLINIC | Facility: CLINIC | Age: 58
End: 2018-04-17

## 2018-04-17 RX ORDER — LISINOPRIL 20 MG/1
20 TABLET ORAL DAILY
Qty: 90 TABLET | Refills: 1 | Status: SHIPPED | OUTPATIENT
Start: 2018-04-17 | End: 2018-10-19 | Stop reason: SDUPTHER

## 2018-04-17 NOTE — TELEPHONE ENCOUNTER
PT REQUESTING FOR HIS RX LISINOPRIL, 40 MG BE CHANGED TO 20 MG BECAUSE HE CUTS HIS PILLS IN HALF AND ONLY TAKES 20 MG DAILY ANYWAY.  PT ALSO STATED THAT HE IS OUT OF RX AND NEEDS A REFILL

## 2018-05-03 RX ORDER — ROSUVASTATIN CALCIUM 20 MG/1
20 TABLET, COATED ORAL DAILY
Qty: 30 TABLET | Refills: 3 | Status: SHIPPED | OUTPATIENT
Start: 2018-05-03 | End: 2018-09-10 | Stop reason: SDUPTHER

## 2018-05-23 ENCOUNTER — OFFICE VISIT (OUTPATIENT)
Dept: FAMILY MEDICINE CLINIC | Facility: CLINIC | Age: 58
End: 2018-05-23

## 2018-05-23 VITALS
TEMPERATURE: 97.8 F | BODY MASS INDEX: 22.8 KG/M2 | DIASTOLIC BLOOD PRESSURE: 80 MMHG | HEART RATE: 70 BPM | SYSTOLIC BLOOD PRESSURE: 130 MMHG | HEIGHT: 73 IN | WEIGHT: 172 LBS | RESPIRATION RATE: 18 BRPM | OXYGEN SATURATION: 100 %

## 2018-05-23 DIAGNOSIS — K21.9 GASTROESOPHAGEAL REFLUX DISEASE, ESOPHAGITIS PRESENCE NOT SPECIFIED: ICD-10-CM

## 2018-05-23 DIAGNOSIS — S80.12XA HEMATOMA OF LEG, LEFT, INITIAL ENCOUNTER: ICD-10-CM

## 2018-05-23 DIAGNOSIS — M25.572 LEFT ANKLE PAIN, UNSPECIFIED CHRONICITY: Primary | ICD-10-CM

## 2018-05-23 PROCEDURE — 73630 X-RAY EXAM OF FOOT: CPT | Performed by: NURSE PRACTITIONER

## 2018-05-23 PROCEDURE — 99213 OFFICE O/P EST LOW 20 MIN: CPT | Performed by: NURSE PRACTITIONER

## 2018-05-23 PROCEDURE — 73610 X-RAY EXAM OF ANKLE: CPT | Performed by: NURSE PRACTITIONER

## 2018-05-23 RX ORDER — OMEPRAZOLE 40 MG/1
40 CAPSULE, DELAYED RELEASE ORAL DAILY
Qty: 90 CAPSULE | Refills: 1 | Status: SHIPPED | OUTPATIENT
Start: 2018-05-23 | End: 2019-05-09 | Stop reason: SDUPTHER

## 2018-05-23 RX ORDER — TIMOLOL MALEATE 5 MG/ML
1 SOLUTION/ DROPS OPHTHALMIC 2 TIMES DAILY
COMMUNITY
Start: 2018-04-05

## 2018-05-23 NOTE — PROGRESS NOTES
Subjective   Elia Walsh is a 58 y.o. male.     History of Present Illness   C/o heart burn, started about 1 week, he takes omeprazole 20mg every other day, he states he eats anything, he has eaten a lot of onion rings in the past week, he did not try any other medication. Denies CP, SOA. Denies wheezing, denies worsening symptoms laying down, states heartburn worse with water, cold drinks.   Also c/o knot on leg. Hit by softball 1 month ago, has not yet resolved, had brusing on foot near achilles, some swelling, has decreased in size some but no resolved. He denies pain, denies leg swelling.   The following portions of the patient's history were reviewed and updated as appropriate: allergies, current medications, past family history, past medical history, past social history, past surgical history and problem list.    Review of Systems   Constitutional: Negative for chills, diaphoresis and fever.   Respiratory: Negative for cough, shortness of breath and wheezing.    Cardiovascular: Negative for chest pain, palpitations and leg swelling.   Gastrointestinal: Positive for GERD. Negative for abdominal pain.   Musculoskeletal: Negative for arthralgias and myalgias.   Skin: Positive for skin lesions. Negative for pallor.   Neurological: Negative for dizziness, light-headedness and headache.   All other systems reviewed and are negative.      Objective   Physical Exam   Constitutional: He is oriented to person, place, and time. He appears well-developed and well-nourished.   HENT:   Head: Normocephalic.   Eyes: Pupils are equal, round, and reactive to light.   Neck: Normal range of motion.   Cardiovascular: Normal rate, regular rhythm, normal heart sounds and intact distal pulses.    Pulmonary/Chest: Effort normal and breath sounds normal.   Musculoskeletal: Normal range of motion.        Left ankle: He exhibits normal range of motion, no swelling, no deformity and normal pulse. Tenderness. Medial malleolus tenderness  found.        Left lower leg: He exhibits swelling. He exhibits no tenderness, no bony tenderness, no edema, no deformity and no laceration.        Legs:  Lymphadenopathy:     He has no cervical adenopathy.   Neurological: He is alert and oriented to person, place, and time.   Skin: Skin is warm and dry.   Psychiatric: He has a normal mood and affect. His behavior is normal.   Nursing note and vitals reviewed.    Left ankle and foot xray today for pain and injury, no comparison, shows NAD , awaiting radiology over read.     Assessment/Plan   Elia was seen today for heartburn and leg injury.    Diagnoses and all orders for this visit:    Left ankle pain, unspecified chronicity  -     XR ankle 3+ vw left  -     XR foot 3+ vw left    Gastroesophageal reflux disease, esophagitis presence not specified    Other orders  -     omeprazole (priLOSEC) 40 MG capsule; Take 1 capsule by mouth Daily.        Left foot and ankle xrays today will call with results.   Increase omeprazole to 40mg daily, low acid diet, sit upright 1 hour after meals, if symptoms persist call office.   Refer to general surgery will call with appt.   If any worsening symptoms call office or advised to go to the ER.   If gerd persists call office.   Increase fluid intake, get plenty of rest.   Patient agrees with plan of care and understands instructions. Call if worsening symptoms or any problems or concerns.

## 2018-05-23 NOTE — PATIENT INSTRUCTIONS
Left foot and ankle xrays today will call with results.   Increase omeprazole to 40mg daily, low acid diet, sit upright 1 hour after meals, if symptoms persist call office.   Refer to general surgery will call with appt.   If any worsening symptoms call office or advised to go to the ER.   If gerd persists call office.   Increase fluid intake, get plenty of rest.   Patient agrees with plan of care and understands instructions. Call if worsening symptoms or any problems or concerns.

## 2018-05-25 ENCOUNTER — TELEPHONE (OUTPATIENT)
Dept: FAMILY MEDICINE CLINIC | Facility: CLINIC | Age: 58
End: 2018-05-25

## 2018-05-25 NOTE — TELEPHONE ENCOUNTER
----- Message from TERA Black sent at 5/25/2018 12:51 PM EDT -----  Please call patient with results. Ankle xray is normal.    Pt informed of Xray results

## 2018-08-02 ENCOUNTER — LAB (OUTPATIENT)
Dept: FAMILY MEDICINE CLINIC | Facility: CLINIC | Age: 58
End: 2018-08-02

## 2018-08-02 DIAGNOSIS — K21.00 GASTROESOPHAGEAL REFLUX DISEASE WITH ESOPHAGITIS: ICD-10-CM

## 2018-08-02 DIAGNOSIS — I10 ESSENTIAL HYPERTENSION: ICD-10-CM

## 2018-08-02 DIAGNOSIS — E78.2 MIXED HYPERLIPIDEMIA: ICD-10-CM

## 2018-08-02 LAB
ALBUMIN SERPL-MCNC: 4.4 G/DL (ref 3.5–5.2)
ALBUMIN/GLOB SERPL: 1.6 G/DL
ALP SERPL-CCNC: 70 U/L (ref 39–117)
ALT SERPL W P-5'-P-CCNC: 32 U/L (ref 1–41)
ANION GAP SERPL CALCULATED.3IONS-SCNC: 11.2 MMOL/L
AST SERPL-CCNC: 23 U/L (ref 1–40)
BILIRUB SERPL-MCNC: 0.4 MG/DL (ref 0.1–1.2)
BUN BLD-MCNC: 24 MG/DL (ref 8–23)
BUN/CREAT SERPL: 28.2 (ref 7–25)
CALCIUM SPEC-SCNC: 9.9 MG/DL (ref 8.6–10.5)
CHLORIDE SERPL-SCNC: 103 MMOL/L (ref 98–107)
CHOLEST SERPL-MCNC: 147 MG/DL (ref 0–200)
CO2 SERPL-SCNC: 25.8 MMOL/L (ref 22–29)
CREAT BLD-MCNC: 0.85 MG/DL (ref 0.76–1.27)
ERYTHROCYTE [DISTWIDTH] IN BLOOD BY AUTOMATED COUNT: 13.8 % (ref 4.5–15)
GFR SERPL CREATININE-BSD FRML MDRD: 92 ML/MIN/1.73
GLOBULIN UR ELPH-MCNC: 2.7 GM/DL
GLUCOSE BLD-MCNC: 99 MG/DL (ref 65–99)
HCT VFR BLD AUTO: 44.2 % (ref 35–60)
HDLC SERPL-MCNC: 49 MG/DL (ref 40–60)
HGB BLD-MCNC: 15.1 G/DL (ref 13.5–18)
LDLC SERPL CALC-MCNC: 83 MG/DL (ref 0–100)
LDLC/HDLC SERPL: 1.7 {RATIO}
LYMPHOCYTES # BLD AUTO: 2.1 10*3/MM3 (ref 1.2–3.4)
LYMPHOCYTES NFR BLD AUTO: 28.7 % (ref 21–51)
MCH RBC QN AUTO: 32.3 PG (ref 26.1–33.1)
MCHC RBC AUTO-ENTMCNC: 34.3 G/DL (ref 33–37)
MCV RBC AUTO: 94.1 FL (ref 80–99)
MONOCYTES # BLD AUTO: 0.8 10*3/MM3 (ref 0.1–0.6)
MONOCYTES NFR BLD AUTO: 11 % (ref 2–9)
NEUTROPHILS # BLD AUTO: 4.4 10*3/MM3 (ref 1.4–6.5)
NEUTROPHILS NFR BLD AUTO: 60.3 % (ref 42–75)
PLATELET # BLD AUTO: 202 10*3/MM3 (ref 150–450)
PMV BLD AUTO: 8.2 FL (ref 7.1–10.5)
POTASSIUM BLD-SCNC: 4.6 MMOL/L (ref 3.5–5.2)
PROT SERPL-MCNC: 7.1 G/DL (ref 6–8.5)
RBC # BLD AUTO: 4.7 10*6/MM3 (ref 4–6)
SODIUM BLD-SCNC: 140 MMOL/L (ref 136–145)
TRIGL SERPL-MCNC: 74 MG/DL (ref 0–150)
VLDLC SERPL-MCNC: 14.8 MG/DL (ref 5–40)
WBC NRBC COR # BLD: 7.3 10*3/MM3 (ref 4.5–10)

## 2018-08-02 PROCEDURE — 85025 COMPLETE CBC W/AUTO DIFF WBC: CPT | Performed by: INTERNAL MEDICINE

## 2018-08-02 PROCEDURE — 80061 LIPID PANEL: CPT | Performed by: INTERNAL MEDICINE

## 2018-08-02 PROCEDURE — 36415 COLL VENOUS BLD VENIPUNCTURE: CPT | Performed by: INTERNAL MEDICINE

## 2018-08-02 PROCEDURE — 80053 COMPREHEN METABOLIC PANEL: CPT | Performed by: INTERNAL MEDICINE

## 2018-08-09 ENCOUNTER — OFFICE VISIT (OUTPATIENT)
Dept: FAMILY MEDICINE CLINIC | Facility: CLINIC | Age: 58
End: 2018-08-09

## 2018-08-09 VITALS
HEIGHT: 73 IN | HEART RATE: 62 BPM | TEMPERATURE: 98.6 F | SYSTOLIC BLOOD PRESSURE: 148 MMHG | DIASTOLIC BLOOD PRESSURE: 84 MMHG | OXYGEN SATURATION: 98 % | WEIGHT: 178.2 LBS | BODY MASS INDEX: 23.62 KG/M2

## 2018-08-09 DIAGNOSIS — E78.2 MIXED HYPERLIPIDEMIA: ICD-10-CM

## 2018-08-09 DIAGNOSIS — I10 ESSENTIAL HYPERTENSION: ICD-10-CM

## 2018-08-09 DIAGNOSIS — Z00.00 PHYSICAL EXAM, ANNUAL: Primary | ICD-10-CM

## 2018-08-09 DIAGNOSIS — N40.1 BENIGN PROSTATIC HYPERPLASIA WITH LOWER URINARY TRACT SYMPTOMS, SYMPTOM DETAILS UNSPECIFIED: ICD-10-CM

## 2018-08-09 PROCEDURE — 99396 PREV VISIT EST AGE 40-64: CPT | Performed by: INTERNAL MEDICINE

## 2018-08-09 NOTE — PROGRESS NOTES
Subjective   Elia Walsh is a 58 y.o. male.     History of Present Illness   Patient seen for physical.  His blood pressures have been running 120s over 80s at home.  His lipid status is been controlled with diet and exercise and medication.  His symptoms of BPH have been controlled with his Flomax.  Patient will continue to monitor his blood pressure return to our clinic in 6 months.  Patient's diet and activity level was discussed.    Dictated utilizing Dragon dictation. If there are questions or for further clarification, please contact me.   The following portions of the patient's history were reviewed and updated as appropriate: allergies, current medications, past family history, past medical history, past social history, past surgical history and problem list.    Review of Systems   Constitutional: Negative for fatigue and fever.   HENT: Positive for congestion. Negative for trouble swallowing.    Eyes: Negative for discharge and visual disturbance.   Respiratory: Negative for choking and shortness of breath.    Cardiovascular: Negative for chest pain and palpitations.   Gastrointestinal: Negative for abdominal pain and blood in stool.   Endocrine: Negative.    Genitourinary: Negative for genital sores and hematuria.   Musculoskeletal: Negative for gait problem and joint swelling.   Skin: Negative for color change, pallor, rash and wound.   Allergic/Immunologic: Positive for environmental allergies. Negative for immunocompromised state.   Neurological: Negative for facial asymmetry and speech difficulty.   Psychiatric/Behavioral: Negative for hallucinations and suicidal ideas.       Objective   Physical Exam   Constitutional: He is oriented to person, place, and time. He appears well-developed and well-nourished. No distress.   HENT:   Head: Normocephalic and atraumatic.   Right Ear: External ear normal.   Left Ear: External ear normal.   Nose: Nose normal.   Mouth/Throat: Oropharynx is clear and moist. No  oropharyngeal exudate.   Eyes: Pupils are equal, round, and reactive to light. Conjunctivae and EOM are normal. Right eye exhibits no discharge. Left eye exhibits no discharge. No scleral icterus.   Neck: Normal range of motion. Neck supple. No JVD present. No tracheal deviation present. No thyromegaly present.   Cardiovascular: Normal rate, regular rhythm and normal heart sounds.  Exam reveals no gallop and no friction rub.    No murmur heard.  Pulmonary/Chest: Effort normal and breath sounds normal. No stridor. No respiratory distress. He has no wheezes. He has no rales. He exhibits no tenderness.   Abdominal: Soft. Bowel sounds are normal. He exhibits no distension and no mass. There is no tenderness. There is no rebound and no guarding. No hernia.   Musculoskeletal: Normal range of motion. He exhibits no edema, tenderness or deformity.   Lymphadenopathy:     He has no cervical adenopathy.   Neurological: He is alert and oriented to person, place, and time. He displays normal reflexes. No cranial nerve deficit or sensory deficit. He exhibits normal muscle tone. Coordination normal.   Skin: Skin is warm and dry. No rash noted. He is not diaphoretic. No erythema. No pallor.   Psychiatric: He has a normal mood and affect. His behavior is normal. Judgment and thought content normal.   Nursing note and vitals reviewed.      Assessment/Plan   Problems Addressed this Visit        Cardiovascular and Mediastinum    Essential hypertension    Relevant Orders    CBC & Differential    Comprehensive Metabolic Panel    Lipid Panel    Vitamin D 25 Hydroxy    Mixed hyperlipidemia    Relevant Orders    CBC & Differential    Comprehensive Metabolic Panel    Lipid Panel    Vitamin D 25 Hydroxy       Genitourinary    Benign prostatic hyperplasia with lower urinary tract symptoms    Relevant Orders    CBC & Differential    Comprehensive Metabolic Panel    Lipid Panel    Vitamin D 25 Hydroxy      Other Visit Diagnoses     Physical  exam, annual    -  Primary

## 2018-09-10 RX ORDER — ROSUVASTATIN CALCIUM 20 MG/1
TABLET, COATED ORAL
Qty: 90 TABLET | Refills: 2 | Status: SHIPPED | OUTPATIENT
Start: 2018-09-10 | End: 2018-12-11 | Stop reason: SDUPTHER

## 2018-10-19 RX ORDER — LISINOPRIL 20 MG/1
TABLET ORAL
Qty: 90 TABLET | Refills: 2 | Status: SHIPPED | OUTPATIENT
Start: 2018-10-19 | End: 2019-07-17 | Stop reason: SDUPTHER

## 2018-11-09 ENCOUNTER — CLINICAL SUPPORT (OUTPATIENT)
Dept: FAMILY MEDICINE CLINIC | Facility: CLINIC | Age: 58
End: 2018-11-09

## 2018-11-09 PROCEDURE — 90674 CCIIV4 VAC NO PRSV 0.5 ML IM: CPT | Performed by: INTERNAL MEDICINE

## 2018-11-09 PROCEDURE — 90471 IMMUNIZATION ADMIN: CPT | Performed by: INTERNAL MEDICINE

## 2018-12-11 RX ORDER — ROSUVASTATIN CALCIUM 20 MG/1
TABLET, COATED ORAL
Qty: 90 TABLET | Refills: 1 | Status: SHIPPED | OUTPATIENT
Start: 2018-12-11 | End: 2019-06-10 | Stop reason: SDUPTHER

## 2019-01-09 ENCOUNTER — OFFICE VISIT (OUTPATIENT)
Dept: FAMILY MEDICINE CLINIC | Facility: CLINIC | Age: 59
End: 2019-01-09

## 2019-01-09 VITALS
WEIGHT: 187.2 LBS | TEMPERATURE: 98.9 F | DIASTOLIC BLOOD PRESSURE: 84 MMHG | SYSTOLIC BLOOD PRESSURE: 122 MMHG | HEART RATE: 67 BPM | BODY MASS INDEX: 24.81 KG/M2 | OXYGEN SATURATION: 98 % | HEIGHT: 73 IN

## 2019-01-09 DIAGNOSIS — M54.12 CERVICAL RADICULOPATHY: ICD-10-CM

## 2019-01-09 DIAGNOSIS — M54.2 NECK PAIN: ICD-10-CM

## 2019-01-09 DIAGNOSIS — M79.601 RIGHT ARM PAIN: Primary | ICD-10-CM

## 2019-01-09 PROCEDURE — 73030 X-RAY EXAM OF SHOULDER: CPT | Performed by: NURSE PRACTITIONER

## 2019-01-09 PROCEDURE — 72050 X-RAY EXAM NECK SPINE 4/5VWS: CPT | Performed by: NURSE PRACTITIONER

## 2019-01-09 PROCEDURE — 99213 OFFICE O/P EST LOW 20 MIN: CPT | Performed by: NURSE PRACTITIONER

## 2019-01-09 RX ORDER — FINASTERIDE 5 MG/1
5 TABLET, FILM COATED ORAL DAILY
COMMUNITY
Start: 2018-12-11

## 2019-01-09 RX ORDER — CYCLOBENZAPRINE HCL 10 MG
10 TABLET ORAL NIGHTLY PRN
Qty: 30 TABLET | Refills: 0 | Status: SHIPPED | OUTPATIENT
Start: 2019-01-09 | End: 2021-03-11

## 2019-01-09 RX ORDER — METHYLPREDNISOLONE 4 MG/1
TABLET ORAL
Qty: 1 EACH | Refills: 0 | Status: SHIPPED | OUTPATIENT
Start: 2019-01-09 | End: 2019-10-16

## 2019-01-09 NOTE — PATIENT INSTRUCTIONS
Neck and right shoulder xrays today will call with results.   Trial medrol pack take as directed.   Flexeril 10mg nightly as needed for spasm, educated about sedation,   Apply heat on 20 min off 1 hour.   If symptoms persist 1-2 weeks call office can consider ortho as he is established.   Increase fluid intake, get plenty of rest.   Patient agrees with plan of care and understands instructions. Call if worsening symptoms or any problems or concerns.

## 2019-01-09 NOTE — PROGRESS NOTES
Subjective   Elia Walsh is a 58 y.o. male.     History of Present Illness   C/o right arm and side pain, started about 5 days ago, he denies any injury, woke up with pain, he has pain upper arm, has pain in back with bending back, he has throbbing pain, he did have neck pain, he is retired, he is right handed. He used to work as mailman, recently rode in car, he denies recent repetative movement. He denies numbness and tingling, states he gets relief with arm above head, tried norco he had from prev shoulder issue at night. Denies urinary symptoms, abd pain, he denies low back pain, denies pain with inspiration, he denies cough. Denies leg pain, has had some pain at times. Has seen ortho Dr. Rubio for left shoulder.    The following portions of the patient's history were reviewed and updated as appropriate: allergies, current medications, past family history, past medical history, past social history, past surgical history and problem list.    Review of Systems   Constitutional: Negative for chills, diaphoresis and fever.   Respiratory: Negative for cough and shortness of breath.    Cardiovascular: Negative for chest pain.   Musculoskeletal: Positive for myalgias and neck pain. Negative for arthralgias.   Neurological: Negative for dizziness, light-headedness and headache.   All other systems reviewed and are negative.      Objective   Physical Exam   Constitutional: He is oriented to person, place, and time. He appears well-developed and well-nourished.   HENT:   Head: Normocephalic.   Eyes: Pupils are equal, round, and reactive to light.   Neck: Neck supple. Muscular tenderness present. Decreased range of motion present.   Cardiovascular: Normal rate, regular rhythm and normal heart sounds.   Pulmonary/Chest: Effort normal and breath sounds normal.   Musculoskeletal:        Right shoulder: He exhibits effusion and decreased strength. He exhibits normal range of motion, no tenderness, no bony tenderness,  no spasm and normal pulse.        Cervical back: He exhibits decreased range of motion and tenderness. He exhibits no bony tenderness, no swelling, no pain and no spasm.   Neurological: He is alert and oriented to person, place, and time.   Skin: Skin is warm and dry.   Psychiatric: He has a normal mood and affect. His behavior is normal.   Nursing note and vitals reviewed.      Neck and right shoulder xrays today for pain, no comparison, shows NAD awaiting radiology over read.     Assessment/Plan   Elia was seen today for arm pain.    Diagnoses and all orders for this visit:    Right arm pain  -     XR Shoulder 2+ View Right (In Office)    Neck pain  -     XR Spine Cervical Complete 4 or 5 View (In Office)    Cervical radiculopathy    Other orders  -     cyclobenzaprine (FLEXERIL) 10 MG tablet; Take 1 tablet by mouth At Night As Needed for Muscle Spasms.  -     MethylPREDNISolone (MEDROL, JULISA,) 4 MG tablet; Take as directed on package instructions.      Neck and right shoulder xrays today will call with results.   Trial medrol pack take as directed.   Flexeril 10mg nightly as needed for spasm, educated about sedation,   Apply heat on 20 min off 1 hour.   If symptoms persist 1-2 weeks call office can consider ortho as he is established.   Increase fluid intake, get plenty of rest.   Patient agrees with plan of care and understands instructions. Call if worsening symptoms or any problems or concerns.

## 2019-02-07 ENCOUNTER — LAB (OUTPATIENT)
Dept: FAMILY MEDICINE CLINIC | Facility: CLINIC | Age: 59
End: 2019-02-07

## 2019-02-07 DIAGNOSIS — N40.1 BENIGN PROSTATIC HYPERPLASIA WITH LOWER URINARY TRACT SYMPTOMS, SYMPTOM DETAILS UNSPECIFIED: ICD-10-CM

## 2019-02-07 DIAGNOSIS — E78.2 MIXED HYPERLIPIDEMIA: ICD-10-CM

## 2019-02-07 DIAGNOSIS — I10 ESSENTIAL HYPERTENSION: ICD-10-CM

## 2019-02-07 LAB
25(OH)D3 SERPL-MCNC: 32.1 NG/ML (ref 30–100)
ALBUMIN SERPL-MCNC: 4.3 G/DL (ref 3.5–5.2)
ALBUMIN/GLOB SERPL: 1.7 G/DL
ALP SERPL-CCNC: 61 U/L (ref 39–117)
ALT SERPL W P-5'-P-CCNC: 34 U/L (ref 1–41)
ANION GAP SERPL CALCULATED.3IONS-SCNC: 11.1 MMOL/L
AST SERPL-CCNC: 22 U/L (ref 1–40)
BILIRUB SERPL-MCNC: 0.4 MG/DL (ref 0.1–1.2)
BUN BLD-MCNC: 20 MG/DL (ref 8–23)
BUN/CREAT SERPL: 25 (ref 7–25)
CALCIUM SPEC-SCNC: 9.8 MG/DL (ref 8.6–10.5)
CHLORIDE SERPL-SCNC: 103 MMOL/L (ref 98–107)
CHOLEST SERPL-MCNC: 167 MG/DL (ref 0–200)
CO2 SERPL-SCNC: 25.9 MMOL/L (ref 22–29)
CREAT BLD-MCNC: 0.8 MG/DL (ref 0.76–1.27)
ERYTHROCYTE [DISTWIDTH] IN BLOOD BY AUTOMATED COUNT: 13.9 % (ref 4.5–15)
GFR SERPL CREATININE-BSD FRML MDRD: 99 ML/MIN/1.73
GLOBULIN UR ELPH-MCNC: 2.5 GM/DL
GLUCOSE BLD-MCNC: 113 MG/DL (ref 65–99)
HCT VFR BLD AUTO: 46 % (ref 35–60)
HDLC SERPL-MCNC: 53 MG/DL (ref 40–60)
HGB BLD-MCNC: 15.6 G/DL (ref 13.5–18)
LDLC SERPL CALC-MCNC: 93 MG/DL (ref 0–100)
LDLC/HDLC SERPL: 1.75 {RATIO}
LYMPHOCYTES # BLD AUTO: 2 10*3/MM3 (ref 1.2–3.4)
LYMPHOCYTES NFR BLD AUTO: 29.6 % (ref 21–51)
MCH RBC QN AUTO: 33.4 PG (ref 26.1–33.1)
MCHC RBC AUTO-ENTMCNC: 34 G/DL (ref 33–37)
MCV RBC AUTO: 98.2 FL (ref 80–99)
MONOCYTES # BLD AUTO: 0.8 10*3/MM3 (ref 0.1–0.6)
MONOCYTES NFR BLD AUTO: 11.3 % (ref 2–9)
NEUTROPHILS # BLD AUTO: 4.1 10*3/MM3 (ref 1.4–6.5)
NEUTROPHILS NFR BLD AUTO: 59.1 % (ref 42–75)
PLATELET # BLD AUTO: 229 10*3/MM3 (ref 150–450)
PMV BLD AUTO: 7.9 FL (ref 7.1–10.5)
POTASSIUM BLD-SCNC: 4.9 MMOL/L (ref 3.5–5.2)
PROT SERPL-MCNC: 6.8 G/DL (ref 6–8.5)
RBC # BLD AUTO: 4.68 10*6/MM3 (ref 4–6)
SODIUM BLD-SCNC: 140 MMOL/L (ref 136–145)
TRIGL SERPL-MCNC: 105 MG/DL (ref 0–150)
VLDLC SERPL-MCNC: 21 MG/DL (ref 5–40)
WBC NRBC COR # BLD: 6.9 10*3/MM3 (ref 4.5–10)

## 2019-02-07 PROCEDURE — 82306 VITAMIN D 25 HYDROXY: CPT | Performed by: INTERNAL MEDICINE

## 2019-02-07 PROCEDURE — 80053 COMPREHEN METABOLIC PANEL: CPT | Performed by: INTERNAL MEDICINE

## 2019-02-07 PROCEDURE — 80061 LIPID PANEL: CPT | Performed by: INTERNAL MEDICINE

## 2019-02-07 PROCEDURE — 85025 COMPLETE CBC W/AUTO DIFF WBC: CPT | Performed by: INTERNAL MEDICINE

## 2019-02-07 PROCEDURE — 36415 COLL VENOUS BLD VENIPUNCTURE: CPT | Performed by: INTERNAL MEDICINE

## 2019-02-14 ENCOUNTER — OFFICE VISIT (OUTPATIENT)
Dept: FAMILY MEDICINE CLINIC | Facility: CLINIC | Age: 59
End: 2019-02-14

## 2019-02-14 VITALS
WEIGHT: 188.6 LBS | HEIGHT: 73 IN | BODY MASS INDEX: 25 KG/M2 | TEMPERATURE: 98.9 F | SYSTOLIC BLOOD PRESSURE: 112 MMHG | DIASTOLIC BLOOD PRESSURE: 70 MMHG | OXYGEN SATURATION: 98 % | HEART RATE: 66 BPM

## 2019-02-14 DIAGNOSIS — E78.2 MIXED HYPERLIPIDEMIA: ICD-10-CM

## 2019-02-14 DIAGNOSIS — I10 ESSENTIAL HYPERTENSION: Primary | ICD-10-CM

## 2019-02-14 DIAGNOSIS — K21.00 GASTROESOPHAGEAL REFLUX DISEASE WITH ESOPHAGITIS: ICD-10-CM

## 2019-02-14 PROCEDURE — 99214 OFFICE O/P EST MOD 30 MIN: CPT | Performed by: INTERNAL MEDICINE

## 2019-02-14 NOTE — PROGRESS NOTES
Subjective   Elia Walsh is a 58 y.o. male.     History of Present Illness   Patient was seen for hypertension.  Pressures been running 120s over 80s.  His gastroesophageal reflux is been controlled with his diet and medication.  His lipids also controlled with diet exercise and a statin.    Dictated utilizing Dragon dictation. If there are questions or for further clarification, please contact me.  The following portions of the patient's history were reviewed and updated as appropriate: allergies, current medications, past family history, past medical history, past social history, past surgical history and problem list.    Review of Systems   Constitutional: Negative for fatigue and fever.   HENT: Positive for congestion. Negative for trouble swallowing.    Eyes: Negative for discharge and visual disturbance.   Respiratory: Negative for choking and shortness of breath.    Cardiovascular: Negative for chest pain and palpitations.   Gastrointestinal: Negative for abdominal pain and blood in stool.   Endocrine: Negative.    Genitourinary: Negative for genital sores and hematuria.   Musculoskeletal: Negative for gait problem and joint swelling.   Skin: Negative for color change, pallor, rash and wound.   Allergic/Immunologic: Positive for environmental allergies. Negative for immunocompromised state.   Neurological: Negative for facial asymmetry and speech difficulty.   Psychiatric/Behavioral: Negative for hallucinations and suicidal ideas.       Objective   Physical Exam   Constitutional: He is oriented to person, place, and time. He appears well-developed and well-nourished.   HENT:   Head: Normocephalic.   Eyes: Conjunctivae are normal. Pupils are equal, round, and reactive to light.   Neck: Normal range of motion. Neck supple.   Cardiovascular: Normal rate, regular rhythm and normal heart sounds.   Pulmonary/Chest: Effort normal and breath sounds normal.   Abdominal: Soft. Bowel sounds are normal.    Musculoskeletal: Normal range of motion.   Neurological: He is alert and oriented to person, place, and time.   Skin: Skin is warm and dry.   Psychiatric: He has a normal mood and affect. His behavior is normal. Judgment and thought content normal.   Nursing note and vitals reviewed.      Assessment/Plan   Problems Addressed this Visit        Cardiovascular and Mediastinum    Essential hypertension - Primary    Mixed hyperlipidemia       Digestive    Gastroesophageal reflux disease with esophagitis

## 2019-05-09 RX ORDER — OMEPRAZOLE 40 MG/1
CAPSULE, DELAYED RELEASE ORAL
Qty: 90 CAPSULE | Refills: 0 | Status: SHIPPED | OUTPATIENT
Start: 2019-05-09 | End: 2019-11-04 | Stop reason: SDUPTHER

## 2019-06-10 RX ORDER — ROSUVASTATIN CALCIUM 20 MG/1
TABLET, COATED ORAL
Qty: 90 TABLET | Refills: 0 | Status: SHIPPED | OUTPATIENT
Start: 2019-06-10 | End: 2019-09-07 | Stop reason: SDUPTHER

## 2019-07-17 RX ORDER — LISINOPRIL 20 MG/1
TABLET ORAL
Qty: 90 TABLET | Refills: 1 | Status: SHIPPED | OUTPATIENT
Start: 2019-07-17 | End: 2020-01-13

## 2019-08-08 ENCOUNTER — LAB (OUTPATIENT)
Dept: FAMILY MEDICINE CLINIC | Facility: CLINIC | Age: 59
End: 2019-08-08

## 2019-08-08 DIAGNOSIS — K21.00 GASTROESOPHAGEAL REFLUX DISEASE WITH ESOPHAGITIS: ICD-10-CM

## 2019-08-08 DIAGNOSIS — E78.2 MIXED HYPERLIPIDEMIA: ICD-10-CM

## 2019-08-08 DIAGNOSIS — I10 ESSENTIAL HYPERTENSION: Primary | ICD-10-CM

## 2019-08-08 LAB
ALBUMIN SERPL-MCNC: 4.7 G/DL (ref 3.5–5.2)
ALBUMIN/GLOB SERPL: 1.8 G/DL
ALP SERPL-CCNC: 61 U/L (ref 39–117)
ALT SERPL W P-5'-P-CCNC: 24 U/L (ref 1–41)
ANION GAP SERPL CALCULATED.3IONS-SCNC: 13.6 MMOL/L (ref 5–15)
AST SERPL-CCNC: 21 U/L (ref 1–40)
BILIRUB SERPL-MCNC: 0.6 MG/DL (ref 0.2–1.2)
BUN BLD-MCNC: 18 MG/DL (ref 8–23)
BUN/CREAT SERPL: 18.8 (ref 7–25)
CALCIUM SPEC-SCNC: 10 MG/DL (ref 8.6–10.5)
CHLORIDE SERPL-SCNC: 102 MMOL/L (ref 98–107)
CHOLEST SERPL-MCNC: 138 MG/DL (ref 0–200)
CO2 SERPL-SCNC: 24.4 MMOL/L (ref 22–29)
CREAT BLD-MCNC: 0.96 MG/DL (ref 0.76–1.27)
ERYTHROCYTE [DISTWIDTH] IN BLOOD BY AUTOMATED COUNT: 13.7 % (ref 12.3–15.4)
GFR SERPL CREATININE-BSD FRML MDRD: 80 ML/MIN/1.73
GLOBULIN UR ELPH-MCNC: 2.6 GM/DL
GLUCOSE BLD-MCNC: 104 MG/DL (ref 65–99)
HCT VFR BLD AUTO: 45.9 % (ref 37.5–51)
HDLC SERPL-MCNC: 48 MG/DL (ref 40–60)
HGB BLD-MCNC: 15.3 G/DL (ref 13–17.7)
LDLC SERPL CALC-MCNC: 74 MG/DL (ref 0–100)
LDLC/HDLC SERPL: 1.55 {RATIO}
LYMPHOCYTES # BLD AUTO: 2 10*3/MM3 (ref 0.7–3.1)
LYMPHOCYTES NFR BLD AUTO: 27.5 % (ref 19.6–45.3)
MCH RBC QN AUTO: 32 PG (ref 26.6–33)
MCHC RBC AUTO-ENTMCNC: 33.2 G/DL (ref 31.5–35.7)
MCV RBC AUTO: 96.3 FL (ref 79–97)
MONOCYTES # BLD AUTO: 0.5 10*3/MM3 (ref 0.1–0.9)
MONOCYTES NFR BLD AUTO: 6.9 % (ref 5–12)
NEUTROPHILS # BLD AUTO: 4.9 10*3/MM3 (ref 1.7–7)
NEUTROPHILS NFR BLD AUTO: 65.6 % (ref 42.7–76)
PLATELET # BLD AUTO: 205 10*3/MM3 (ref 140–450)
PMV BLD AUTO: 8.8 FL (ref 6–12)
POTASSIUM BLD-SCNC: 4.4 MMOL/L (ref 3.5–5.2)
PROT SERPL-MCNC: 7.3 G/DL (ref 6–8.5)
RBC # BLD AUTO: 4.77 10*6/MM3 (ref 4.14–5.8)
SODIUM BLD-SCNC: 140 MMOL/L (ref 136–145)
TRIGL SERPL-MCNC: 78 MG/DL (ref 0–150)
VLDLC SERPL-MCNC: 15.6 MG/DL (ref 5–40)
WBC NRBC COR # BLD: 7.4 10*3/MM3 (ref 3.4–10.8)

## 2019-08-08 PROCEDURE — 85025 COMPLETE CBC W/AUTO DIFF WBC: CPT | Performed by: INTERNAL MEDICINE

## 2019-08-08 PROCEDURE — 80061 LIPID PANEL: CPT | Performed by: INTERNAL MEDICINE

## 2019-08-08 PROCEDURE — 80053 COMPREHEN METABOLIC PANEL: CPT | Performed by: INTERNAL MEDICINE

## 2019-08-08 PROCEDURE — 36415 COLL VENOUS BLD VENIPUNCTURE: CPT | Performed by: INTERNAL MEDICINE

## 2019-08-21 ENCOUNTER — OFFICE VISIT (OUTPATIENT)
Dept: FAMILY MEDICINE CLINIC | Facility: CLINIC | Age: 59
End: 2019-08-21

## 2019-08-21 VITALS
BODY MASS INDEX: 24.78 KG/M2 | HEIGHT: 73 IN | OXYGEN SATURATION: 99 % | SYSTOLIC BLOOD PRESSURE: 118 MMHG | RESPIRATION RATE: 14 BRPM | HEART RATE: 59 BPM | DIASTOLIC BLOOD PRESSURE: 70 MMHG | TEMPERATURE: 98.5 F | WEIGHT: 187 LBS

## 2019-08-21 DIAGNOSIS — Z00.00 PHYSICAL EXAM, ANNUAL: Primary | ICD-10-CM

## 2019-08-21 PROCEDURE — 99396 PREV VISIT EST AGE 40-64: CPT | Performed by: INTERNAL MEDICINE

## 2019-08-21 RX ORDER — ICOSAPENT ETHYL 1000 MG/1
2 CAPSULE ORAL 2 TIMES DAILY WITH MEALS
Qty: 120 CAPSULE | Refills: 3 | Status: SHIPPED | OUTPATIENT
Start: 2019-08-21 | End: 2020-05-14

## 2019-09-09 RX ORDER — ROSUVASTATIN CALCIUM 20 MG/1
TABLET, COATED ORAL
Qty: 90 TABLET | Refills: 0 | Status: SHIPPED | OUTPATIENT
Start: 2019-09-09 | End: 2019-12-13 | Stop reason: SDUPTHER

## 2019-10-16 ENCOUNTER — OFFICE VISIT (OUTPATIENT)
Dept: FAMILY MEDICINE CLINIC | Facility: CLINIC | Age: 59
End: 2019-10-16

## 2019-10-16 VITALS
SYSTOLIC BLOOD PRESSURE: 112 MMHG | TEMPERATURE: 99.1 F | BODY MASS INDEX: 24.86 KG/M2 | HEIGHT: 73 IN | DIASTOLIC BLOOD PRESSURE: 76 MMHG | OXYGEN SATURATION: 98 % | HEART RATE: 72 BPM | WEIGHT: 187.6 LBS

## 2019-10-16 DIAGNOSIS — J01.00 ACUTE NON-RECURRENT MAXILLARY SINUSITIS: ICD-10-CM

## 2019-10-16 DIAGNOSIS — J06.9 ACUTE URI: Primary | ICD-10-CM

## 2019-10-16 DIAGNOSIS — J30.9 ALLERGIC RHINITIS, UNSPECIFIED SEASONALITY, UNSPECIFIED TRIGGER: ICD-10-CM

## 2019-10-16 PROCEDURE — 99213 OFFICE O/P EST LOW 20 MIN: CPT | Performed by: NURSE PRACTITIONER

## 2019-10-16 RX ORDER — BENZONATATE 100 MG/1
100 CAPSULE ORAL 3 TIMES DAILY PRN
Qty: 21 CAPSULE | Refills: 0 | Status: SHIPPED | OUTPATIENT
Start: 2019-10-16 | End: 2020-09-10

## 2019-10-16 RX ORDER — AZITHROMYCIN 250 MG/1
TABLET, FILM COATED ORAL
Qty: 6 TABLET | Refills: 0 | Status: SHIPPED | OUTPATIENT
Start: 2019-10-16 | End: 2020-03-04

## 2019-10-16 RX ORDER — FLUTICASONE PROPIONATE 50 MCG
1 SPRAY, SUSPENSION (ML) NASAL DAILY
Qty: 1 BOTTLE | Refills: 3 | Status: SHIPPED | OUTPATIENT
Start: 2019-10-16 | End: 2020-11-24

## 2019-10-16 NOTE — PROGRESS NOTES
Subjective   Elia Walsh is a 59 y.o. male.     History of Present Illness   C/o cough, nasal congestion, low grade temp today, denies fever at home started about 1 week ago, he has non productive cough, he does have sinus pressure, drainage, does have right ear pain, he does not use flonase, denies sore throat, he tried mucinex OTC for cough.       The following portions of the patient's history were reviewed and updated as appropriate: allergies, current medications, past family history, past medical history, past social history, past surgical history and problem list.    Review of Systems   Constitutional: Negative for chills, diaphoresis and fever.   HENT: Positive for congestion, ear pain, postnasal drip, rhinorrhea and sinus pressure. Negative for sore throat.    Respiratory: Positive for cough. Negative for shortness of breath and wheezing.    Cardiovascular: Negative for chest pain.   Musculoskeletal: Negative for arthralgias and myalgias.   Allergic/Immunologic: Positive for environmental allergies.   Neurological: Negative for dizziness, light-headedness and headache.   All other systems reviewed and are negative.      Objective   Physical Exam   Constitutional: He is oriented to person, place, and time. He appears well-developed and well-nourished.   HENT:   Head: Normocephalic.   Right Ear: Ear canal normal. Tympanic membrane mobility is abnormal.   Left Ear: Tympanic membrane and ear canal normal.   Nose: Mucosal edema present.   Mouth/Throat: Uvula is midline and mucous membranes are normal. Posterior oropharyngeal erythema present.   Eyes: Pupils are equal, round, and reactive to light.   Neck: Normal range of motion.   Cardiovascular: Normal rate, regular rhythm and normal heart sounds.   Pulmonary/Chest: Effort normal and breath sounds normal.   Musculoskeletal: Normal range of motion.   Lymphadenopathy:     He has no cervical adenopathy.   Neurological: He is alert and oriented to person, place,  and time.   Skin: Skin is warm and dry.   Psychiatric: He has a normal mood and affect. His behavior is normal.   Nursing note and vitals reviewed.        Assessment/Plan   Elia was seen today for cough and nasal congestion.    Diagnoses and all orders for this visit:    Acute URI    Allergic rhinitis, unspecified seasonality, unspecified trigger    Acute non-recurrent maxillary sinusitis  -     fluticasone (FLONASE) 50 MCG/ACT nasal spray; 1 spray into the nostril(s) as directed by provider Daily.    Other orders  -     azithromycin (ZITHROMAX) 250 MG tablet; Take 2 tablets the first day, then 1 tablet daily for 4 days.  -     benzonatate (TESSALON PERLES) 100 MG capsule; Take 1 capsule by mouth 3 (Three) Times a Day As Needed for Cough.    Start zpack take as directed.   Cont flonase daily as needed for drainage  Tessalon perles as needed for cough,   If symptoms persist 5-7 days call office   Increase fluid intake, get plenty of rest.   Patient agrees with plan of care and understands instructions. Call if worsening symptoms or any problems or concerns.

## 2019-10-16 NOTE — PATIENT INSTRUCTIONS
Start zpack take as directed.   Cont flonase daily as needed for drainage  Tessalon perles as needed for cough,   If symptoms persist 5-7 days call office   Increase fluid intake, get plenty of rest.   Patient agrees with plan of care and understands instructions. Call if worsening symptoms or any problems or concerns.

## 2019-10-22 ENCOUNTER — CLINICAL SUPPORT (OUTPATIENT)
Dept: FAMILY MEDICINE CLINIC | Facility: CLINIC | Age: 59
End: 2019-10-22

## 2019-10-22 PROCEDURE — 90674 CCIIV4 VAC NO PRSV 0.5 ML IM: CPT | Performed by: INTERNAL MEDICINE

## 2019-10-22 PROCEDURE — 90471 IMMUNIZATION ADMIN: CPT | Performed by: INTERNAL MEDICINE

## 2019-11-04 RX ORDER — OMEPRAZOLE 40 MG/1
CAPSULE, DELAYED RELEASE ORAL
Qty: 90 CAPSULE | Refills: 0 | Status: SHIPPED | OUTPATIENT
Start: 2019-11-04 | End: 2020-05-04

## 2019-12-13 ENCOUNTER — TELEPHONE (OUTPATIENT)
Dept: FAMILY MEDICINE CLINIC | Facility: CLINIC | Age: 59
End: 2019-12-13

## 2019-12-13 RX ORDER — ROSUVASTATIN CALCIUM 20 MG/1
20 TABLET, COATED ORAL DAILY
Qty: 90 TABLET | Refills: 0 | Status: SHIPPED | OUTPATIENT
Start: 2019-12-13 | End: 2020-03-09

## 2020-01-13 RX ORDER — LISINOPRIL 20 MG/1
TABLET ORAL
Qty: 90 TABLET | Refills: 0 | Status: SHIPPED | OUTPATIENT
Start: 2020-01-13 | End: 2020-04-13

## 2020-02-26 ENCOUNTER — LAB (OUTPATIENT)
Dept: FAMILY MEDICINE CLINIC | Facility: CLINIC | Age: 60
End: 2020-02-26

## 2020-02-26 DIAGNOSIS — E78.2 MIXED HYPERLIPIDEMIA: ICD-10-CM

## 2020-02-26 DIAGNOSIS — I10 ESSENTIAL HYPERTENSION: Primary | ICD-10-CM

## 2020-02-26 LAB
ALBUMIN SERPL-MCNC: 4.6 G/DL (ref 3.5–5.2)
ALBUMIN/GLOB SERPL: 1.8 G/DL
ALP SERPL-CCNC: 61 U/L (ref 39–117)
ALT SERPL W P-5'-P-CCNC: 27 U/L (ref 1–41)
ANION GAP SERPL CALCULATED.3IONS-SCNC: 8.9 MMOL/L (ref 5–15)
AST SERPL-CCNC: 22 U/L (ref 1–40)
BILIRUB SERPL-MCNC: 0.6 MG/DL (ref 0.2–1.2)
BUN BLD-MCNC: 20 MG/DL (ref 8–23)
BUN/CREAT SERPL: 22.7 (ref 7–25)
CALCIUM SPEC-SCNC: 9.5 MG/DL (ref 8.6–10.5)
CHLORIDE SERPL-SCNC: 101 MMOL/L (ref 98–107)
CHOLEST SERPL-MCNC: 148 MG/DL (ref 0–200)
CO2 SERPL-SCNC: 25.1 MMOL/L (ref 22–29)
CREAT BLD-MCNC: 0.88 MG/DL (ref 0.76–1.27)
ERYTHROCYTE [DISTWIDTH] IN BLOOD BY AUTOMATED COUNT: 13.6 % (ref 12.3–15.4)
GFR SERPL CREATININE-BSD FRML MDRD: 88 ML/MIN/1.73
GLOBULIN UR ELPH-MCNC: 2.6 GM/DL
GLUCOSE BLD-MCNC: 112 MG/DL (ref 65–99)
HCT VFR BLD AUTO: 44.9 % (ref 37.5–51)
HDLC SERPL-MCNC: 42 MG/DL (ref 40–60)
HGB BLD-MCNC: 15.1 G/DL (ref 13–17.7)
LDLC SERPL CALC-MCNC: 80 MG/DL (ref 0–100)
LDLC/HDLC SERPL: 1.91 {RATIO}
LYMPHOCYTES # BLD AUTO: 2.3 10*3/MM3 (ref 0.7–3.1)
LYMPHOCYTES NFR BLD AUTO: 27.7 % (ref 19.6–45.3)
MCH RBC QN AUTO: 32.3 PG (ref 26.6–33)
MCHC RBC AUTO-ENTMCNC: 33.7 G/DL (ref 31.5–35.7)
MCV RBC AUTO: 96 FL (ref 79–97)
MONOCYTES # BLD AUTO: 0.8 10*3/MM3 (ref 0.1–0.9)
MONOCYTES NFR BLD AUTO: 10.3 % (ref 5–12)
NEUTROPHILS # BLD AUTO: 5.1 10*3/MM3 (ref 1.7–7)
NEUTROPHILS NFR BLD AUTO: 62 % (ref 42.7–76)
PLATELET # BLD AUTO: 211 10*3/MM3 (ref 140–450)
PMV BLD AUTO: 7.9 FL (ref 6–12)
POTASSIUM BLD-SCNC: 4.3 MMOL/L (ref 3.5–5.2)
PROT SERPL-MCNC: 7.2 G/DL (ref 6–8.5)
RBC # BLD AUTO: 4.67 10*6/MM3 (ref 4.14–5.8)
SODIUM BLD-SCNC: 135 MMOL/L (ref 136–145)
TRIGL SERPL-MCNC: 129 MG/DL (ref 0–150)
VLDLC SERPL-MCNC: 25.8 MG/DL (ref 5–40)
WBC NRBC COR # BLD: 8.2 10*3/MM3 (ref 3.4–10.8)

## 2020-02-26 PROCEDURE — 36415 COLL VENOUS BLD VENIPUNCTURE: CPT | Performed by: INTERNAL MEDICINE

## 2020-02-26 PROCEDURE — 85025 COMPLETE CBC W/AUTO DIFF WBC: CPT | Performed by: INTERNAL MEDICINE

## 2020-02-26 PROCEDURE — 80053 COMPREHEN METABOLIC PANEL: CPT | Performed by: INTERNAL MEDICINE

## 2020-02-26 PROCEDURE — 80061 LIPID PANEL: CPT | Performed by: INTERNAL MEDICINE

## 2020-03-04 ENCOUNTER — OFFICE VISIT (OUTPATIENT)
Dept: FAMILY MEDICINE CLINIC | Facility: CLINIC | Age: 60
End: 2020-03-04

## 2020-03-04 VITALS
HEART RATE: 54 BPM | DIASTOLIC BLOOD PRESSURE: 70 MMHG | WEIGHT: 196 LBS | TEMPERATURE: 98.4 F | HEIGHT: 73 IN | OXYGEN SATURATION: 99 % | BODY MASS INDEX: 25.98 KG/M2 | SYSTOLIC BLOOD PRESSURE: 126 MMHG

## 2020-03-04 DIAGNOSIS — S19.9XXA SOFT TISSUE INJURY OF NECK, INITIAL ENCOUNTER: ICD-10-CM

## 2020-03-04 DIAGNOSIS — I10 ESSENTIAL HYPERTENSION: Primary | ICD-10-CM

## 2020-03-04 DIAGNOSIS — K21.00 GASTROESOPHAGEAL REFLUX DISEASE WITH ESOPHAGITIS: ICD-10-CM

## 2020-03-04 DIAGNOSIS — E78.2 MIXED HYPERLIPIDEMIA: ICD-10-CM

## 2020-03-04 PROCEDURE — 99214 OFFICE O/P EST MOD 30 MIN: CPT | Performed by: INTERNAL MEDICINE

## 2020-03-04 NOTE — PROGRESS NOTES
Subjective   Elia Walsh is a 59 y.o. male.     History of Present Illness   Patient seen for hypertension.  Blood sugar pressures been running 120s over 70s.  Patient's lipids being treated with diet exercise and a statin.  Previous triglycerides are 78, HDL 48, LDL 74.  Patient did have labs today.  Patient was advised to continue present diet and activity levels.  His reflux is being treated with over-the-counter antacids.  Patient did have soft tissue along the right side of his neck.  Ultrasound was ordered.  Patient will follow-up in 4 days after testing.    Dictated utilizing Dragon dictation. If there are questions or for further clarification, please contact me.  The following portions of the patient's history were reviewed and updated as appropriate: allergies, current medications, past family history, past medical history, past social history, past surgical history and problem list.    Review of Systems   Constitutional: Negative for fatigue and fever.   HENT: Positive for congestion. Negative for trouble swallowing.    Eyes: Negative for discharge and visual disturbance.   Respiratory: Negative for choking and shortness of breath.    Cardiovascular: Negative for chest pain and palpitations.   Gastrointestinal: Negative for abdominal pain and blood in stool.   Endocrine: Negative.    Genitourinary: Negative for genital sores and hematuria.   Musculoskeletal: Negative for gait problem and joint swelling.   Skin: Negative for color change, pallor, rash and wound.        Soft tissue area around his neck   Allergic/Immunologic: Positive for environmental allergies. Negative for immunocompromised state.   Neurological: Negative for facial asymmetry and speech difficulty.   Psychiatric/Behavioral: Negative for hallucinations and suicidal ideas.       Objective   Physical Exam   Constitutional: He is oriented to person, place, and time. He appears well-developed and well-nourished.   HENT:   Head:  Normocephalic.   Eyes: Pupils are equal, round, and reactive to light. Conjunctivae are normal.   Neck: Normal range of motion. Neck supple.   Cardiovascular: Normal rate, regular rhythm and normal heart sounds. Exam reveals no gallop and no friction rub.   No murmur heard.  Pulmonary/Chest: Effort normal and breath sounds normal. No stridor. No respiratory distress. He has no wheezes. He has no rales. He exhibits no tenderness.   Abdominal: Soft. Bowel sounds are normal.   Musculoskeletal: Normal range of motion.   Neurological: He is alert and oriented to person, place, and time.   Skin: Skin is warm and dry.   Soft tissue around the right neck area   Psychiatric: He has a normal mood and affect. His behavior is normal. Judgment and thought content normal.   Nursing note and vitals reviewed.      Assessment/Plan 1 ultrasound #2 continue to monitor blood pressure at home #3 continue present diet and activity levels  Elia was seen today for neck swelling, go over labs from last week and hypertension.    Diagnoses and all orders for this visit:    Essential hypertension    Mixed hyperlipidemia    Gastroesophageal reflux disease with esophagitis    Soft tissue injury of neck, initial encounter  -     US Head Neck Soft Tissue; Future

## 2020-03-09 RX ORDER — ROSUVASTATIN CALCIUM 20 MG/1
TABLET, COATED ORAL
Qty: 90 TABLET | Refills: 1 | Status: SHIPPED | OUTPATIENT
Start: 2020-03-09 | End: 2020-09-03 | Stop reason: SDUPTHER

## 2020-03-11 ENCOUNTER — HOSPITAL ENCOUNTER (OUTPATIENT)
Dept: ULTRASOUND IMAGING | Facility: HOSPITAL | Age: 60
Discharge: HOME | End: 2020-03-11
Admitting: INTERNAL MEDICINE

## 2020-03-11 DIAGNOSIS — S19.9XXA SOFT TISSUE INJURY OF NECK, INITIAL ENCOUNTER: ICD-10-CM

## 2020-03-11 PROCEDURE — 76536 US EXAM OF HEAD AND NECK: CPT

## 2020-03-17 DIAGNOSIS — E04.1 THYROID NODULE: Primary | ICD-10-CM

## 2020-04-13 RX ORDER — LISINOPRIL 20 MG/1
TABLET ORAL
Qty: 90 TABLET | Refills: 0 | Status: SHIPPED | OUTPATIENT
Start: 2020-04-13 | End: 2020-07-14

## 2020-05-04 RX ORDER — OMEPRAZOLE 40 MG/1
CAPSULE, DELAYED RELEASE ORAL
Qty: 90 CAPSULE | Refills: 0 | Status: SHIPPED | OUTPATIENT
Start: 2020-05-04 | End: 2020-11-03

## 2020-05-14 RX ORDER — ICOSAPENT ETHYL 1000 MG/1
CAPSULE ORAL
Qty: 120 CAPSULE | Refills: 2 | Status: SHIPPED | OUTPATIENT
Start: 2020-05-14 | End: 2020-11-13

## 2020-07-14 RX ORDER — LISINOPRIL 20 MG/1
TABLET ORAL
Qty: 90 TABLET | Refills: 0 | Status: SHIPPED | OUTPATIENT
Start: 2020-07-14 | End: 2020-10-12

## 2020-09-02 DIAGNOSIS — E04.1 THYROID NODULE: Primary | ICD-10-CM

## 2020-09-02 DIAGNOSIS — I10 ESSENTIAL HYPERTENSION: ICD-10-CM

## 2020-09-02 DIAGNOSIS — E78.2 MIXED HYPERLIPIDEMIA: ICD-10-CM

## 2020-09-03 ENCOUNTER — TELEPHONE (OUTPATIENT)
Dept: FAMILY MEDICINE CLINIC | Facility: CLINIC | Age: 60
End: 2020-09-03

## 2020-09-03 ENCOUNTER — LAB (OUTPATIENT)
Dept: FAMILY MEDICINE CLINIC | Facility: CLINIC | Age: 60
End: 2020-09-03

## 2020-09-03 DIAGNOSIS — E78.2 MIXED HYPERLIPIDEMIA: ICD-10-CM

## 2020-09-03 DIAGNOSIS — I10 ESSENTIAL HYPERTENSION: ICD-10-CM

## 2020-09-03 LAB
25(OH)D3 SERPL-MCNC: 46.1 NG/ML (ref 30–100)
ALBUMIN SERPL-MCNC: 4.2 G/DL (ref 3.5–5.2)
ALBUMIN/GLOB SERPL: 1.4 G/DL
ALP SERPL-CCNC: 74 U/L (ref 39–117)
ALT SERPL W P-5'-P-CCNC: 25 U/L (ref 1–41)
ANION GAP SERPL CALCULATED.3IONS-SCNC: 9.2 MMOL/L (ref 5–15)
AST SERPL-CCNC: 22 U/L (ref 1–40)
BILIRUB SERPL-MCNC: 0.3 MG/DL (ref 0–1.2)
BUN SERPL-MCNC: 22 MG/DL (ref 8–23)
BUN/CREAT SERPL: 23.4 (ref 7–25)
CALCIUM SPEC-SCNC: 9.8 MG/DL (ref 8.6–10.5)
CHLORIDE SERPL-SCNC: 102 MMOL/L (ref 98–107)
CHOLEST SERPL-MCNC: 155 MG/DL (ref 0–200)
CO2 SERPL-SCNC: 25.8 MMOL/L (ref 22–29)
CREAT SERPL-MCNC: 0.94 MG/DL (ref 0.76–1.27)
DEPRECATED RDW RBC AUTO: 45.7 FL (ref 37–54)
ERYTHROCYTE [DISTWIDTH] IN BLOOD BY AUTOMATED COUNT: 13.1 % (ref 12.3–15.4)
GFR SERPL CREATININE-BSD FRML MDRD: 81 ML/MIN/1.73
GLOBULIN UR ELPH-MCNC: 3 GM/DL
GLUCOSE SERPL-MCNC: 101 MG/DL (ref 65–99)
HCT VFR BLD AUTO: 42.3 % (ref 37.5–51)
HDLC SERPL-MCNC: 39 MG/DL (ref 40–60)
HGB BLD-MCNC: 14.5 G/DL (ref 13–17.7)
LDLC SERPL CALC-MCNC: 77 MG/DL (ref 0–100)
LDLC/HDLC SERPL: 1.97 {RATIO}
MCH RBC QN AUTO: 32.7 PG (ref 26.6–33)
MCHC RBC AUTO-ENTMCNC: 34.3 G/DL (ref 31.5–35.7)
MCV RBC AUTO: 95.5 FL (ref 79–97)
PLATELET # BLD AUTO: 222 10*3/MM3 (ref 140–450)
PMV BLD AUTO: 11.4 FL (ref 6–12)
POTASSIUM SERPL-SCNC: 4.7 MMOL/L (ref 3.5–5.2)
PROT SERPL-MCNC: 7.2 G/DL (ref 6–8.5)
RBC # BLD AUTO: 4.43 10*6/MM3 (ref 4.14–5.8)
SODIUM SERPL-SCNC: 137 MMOL/L (ref 136–145)
TRIGL SERPL-MCNC: 196 MG/DL (ref 0–150)
VLDLC SERPL-MCNC: 39.2 MG/DL (ref 5–40)
WBC # BLD AUTO: 8.45 10*3/MM3 (ref 3.4–10.8)

## 2020-09-03 PROCEDURE — 80053 COMPREHEN METABOLIC PANEL: CPT | Performed by: INTERNAL MEDICINE

## 2020-09-03 PROCEDURE — 80061 LIPID PANEL: CPT | Performed by: INTERNAL MEDICINE

## 2020-09-03 PROCEDURE — 36415 COLL VENOUS BLD VENIPUNCTURE: CPT | Performed by: INTERNAL MEDICINE

## 2020-09-03 PROCEDURE — 85027 COMPLETE CBC AUTOMATED: CPT | Performed by: INTERNAL MEDICINE

## 2020-09-03 PROCEDURE — 82306 VITAMIN D 25 HYDROXY: CPT | Performed by: INTERNAL MEDICINE

## 2020-09-03 RX ORDER — ROSUVASTATIN CALCIUM 20 MG/1
20 TABLET, COATED ORAL DAILY
Qty: 90 TABLET | Refills: 1 | Status: SHIPPED | OUTPATIENT
Start: 2020-09-03 | End: 2021-02-22

## 2020-09-10 ENCOUNTER — OFFICE VISIT (OUTPATIENT)
Dept: FAMILY MEDICINE CLINIC | Facility: CLINIC | Age: 60
End: 2020-09-10

## 2020-09-10 VITALS
TEMPERATURE: 98.2 F | BODY MASS INDEX: 25.84 KG/M2 | DIASTOLIC BLOOD PRESSURE: 70 MMHG | SYSTOLIC BLOOD PRESSURE: 120 MMHG | WEIGHT: 195 LBS | HEIGHT: 73 IN | HEART RATE: 62 BPM | OXYGEN SATURATION: 99 %

## 2020-09-10 DIAGNOSIS — I10 ESSENTIAL HYPERTENSION: ICD-10-CM

## 2020-09-10 DIAGNOSIS — K21.00 GASTROESOPHAGEAL REFLUX DISEASE WITH ESOPHAGITIS: ICD-10-CM

## 2020-09-10 DIAGNOSIS — E78.2 MIXED HYPERLIPIDEMIA: ICD-10-CM

## 2020-09-10 DIAGNOSIS — R00.2 PALPITATION: Primary | ICD-10-CM

## 2020-09-10 PROCEDURE — 90471 IMMUNIZATION ADMIN: CPT | Performed by: INTERNAL MEDICINE

## 2020-09-10 PROCEDURE — 99214 OFFICE O/P EST MOD 30 MIN: CPT | Performed by: INTERNAL MEDICINE

## 2020-09-10 PROCEDURE — 90686 IIV4 VACC NO PRSV 0.5 ML IM: CPT | Performed by: INTERNAL MEDICINE

## 2020-09-10 PROCEDURE — 93000 ELECTROCARDIOGRAM COMPLETE: CPT | Performed by: INTERNAL MEDICINE

## 2020-09-10 NOTE — PROGRESS NOTES
Subjective   Elia Walsh is a 60 y.o. male.     Vitals:    09/10/20 0834   BP: 120/70   Pulse: 62   Temp: 98.2 °F (36.8 °C)   SpO2: 99%      Body mass index is 25.73 kg/m².     History of Present Illness   Patient was seen for palpitations.  Patient states he felt severe fluttering in his chest 3 times over the last 3 months.  His latest episode was 1 week ago.  He was driving back from [place] and after getting home that night he developed palpitations.  Patient stated he only had 1 cup of coffee and did not use any of the energy drinks or excess caffeine or any stimulants.  Patient's EKG shows a sinus rhythm with a rate of 54.  Patient had a Holter monitor ordered.  Patient will follow-up after Holter.  Patient's blood pressure was running 120s over 70s.  Lipids controlled With diet exercise and a statin.  Triglycerides 196, HDL 39, LDL 77.  His gastroesophageal reflux has been controlled with a PPI.    Dictated utilizing Dragon dictation. If there are questions or for further clarification, please contact me.  The following portions of the patient's history were reviewed and updated as appropriate: allergies, current medications, past family history, past medical history, past social history, past surgical history and problem list.    Review of Systems   Constitutional: Negative for fatigue and fever.   HENT: Positive for congestion. Negative for trouble swallowing.    Eyes: Negative for discharge and visual disturbance.   Respiratory: Negative for choking and shortness of breath.    Cardiovascular: Positive for palpitations. Negative for chest pain.   Gastrointestinal: Negative for abdominal pain and blood in stool.   Endocrine: Negative.    Genitourinary: Negative for genital sores and hematuria.   Musculoskeletal: Negative for gait problem and joint swelling.   Skin: Negative for color change, pallor, rash and wound.   Allergic/Immunologic: Positive for environmental allergies. Negative for  immunocompromised state.   Neurological: Negative for facial asymmetry and speech difficulty.   Psychiatric/Behavioral: Negative for hallucinations and suicidal ideas.       Objective   Physical Exam   Constitutional: He is oriented to person, place, and time. He appears well-developed and well-nourished.   HENT:   Head: Normocephalic and atraumatic.   Eyes: Pupils are equal, round, and reactive to light. Conjunctivae and EOM are normal.   Neck: Normal range of motion. Neck supple.   Cardiovascular: Normal rate, regular rhythm and normal heart sounds. Exam reveals no gallop and no friction rub.   No murmur heard.  Pulmonary/Chest: Effort normal and breath sounds normal. No stridor. No respiratory distress. He has no wheezes. He has no rales. He exhibits no tenderness.   Abdominal: Soft. Bowel sounds are normal.   Musculoskeletal: Normal range of motion.   Neurological: He is alert and oriented to person, place, and time.   Skin: Skin is warm and dry.   Psychiatric: He has a normal mood and affect. His behavior is normal. Judgment and thought content normal.   Nursing note and vitals reviewed.      Assessment/Plan #1 Holter monitor #2 avoid all stimulants #3 continue present diet and activity levels  ECG 12 Lead  Date/Time: 9/10/2020 12:20 PM  Performed by: Ghulam Colmenares MD  Authorized by: Ghulam Colmenares MD   Comparison: not compared with previous ECG   Rhythm: sinus bradycardia  Rate: normal  Conduction: conduction normal  ST Segments: ST segments normal  T Waves: T waves normal  QRS axis: normal  Other: no other findings    Clinical impression: non-specific ECG  Comments: EKG Interpretation Report    Heart rate:    54 beats/min, MS interval: 169 msec, QRS duration: 102 msec  QTu: 408 msec, QTc: 394 msec            Problems Addressed this Visit     Cardiovascular and Mediastinum              Essential hypertension    Mixed hyperlipidemia          Digestive              Gastroesophageal reflux disease with  esophagitis            Other Visit Diagnoses     Palpitation    -  Primary    Relevant Orders    Holter Monitor - 72 Hour Up To 21 Days    ECG 12 Lead

## 2020-09-16 ENCOUNTER — TELEPHONE (OUTPATIENT)
Dept: FAMILY MEDICINE CLINIC | Facility: CLINIC | Age: 60
End: 2020-09-16

## 2020-09-16 DIAGNOSIS — Z12.5 PROSTATE CANCER SCREENING: Primary | ICD-10-CM

## 2020-09-16 DIAGNOSIS — I10 ESSENTIAL HYPERTENSION: ICD-10-CM

## 2020-09-16 DIAGNOSIS — E78.2 MIXED HYPERLIPIDEMIA: ICD-10-CM

## 2020-09-16 NOTE — TELEPHONE ENCOUNTER
PT CALLED REQUESTING TO SCHEDULE A LAB APPOINTMENT A WEEK AHEAD OF HIS 6 MO F/U WITH DR. HARTLEY ON 3/11.  PLEASE PLACE ORDERS FOR ANY  NEEDED LABS AND CALL PT BACK TO SCHEDULE.    CALLBACK NUMBER: [phone]

## 2020-10-12 RX ORDER — LISINOPRIL 20 MG/1
TABLET ORAL
Qty: 90 TABLET | Refills: 0 | Status: SHIPPED | OUTPATIENT
Start: 2020-10-12 | End: 2021-01-11

## 2020-10-14 ENCOUNTER — TELEPHONE (OUTPATIENT)
Dept: FAMILY MEDICINE CLINIC | Facility: CLINIC | Age: 60
End: 2020-10-14

## 2020-10-14 ENCOUNTER — OFFICE VISIT (OUTPATIENT)
Dept: FAMILY MEDICINE CLINIC | Facility: CLINIC | Age: 60
End: 2020-10-14

## 2020-10-14 VITALS
BODY MASS INDEX: 25.53 KG/M2 | OXYGEN SATURATION: 99 % | TEMPERATURE: 99.1 F | SYSTOLIC BLOOD PRESSURE: 112 MMHG | DIASTOLIC BLOOD PRESSURE: 76 MMHG | HEART RATE: 61 BPM | HEIGHT: 73 IN | WEIGHT: 192.6 LBS

## 2020-10-14 DIAGNOSIS — I10 ESSENTIAL HYPERTENSION: ICD-10-CM

## 2020-10-14 DIAGNOSIS — E78.2 MIXED HYPERLIPIDEMIA: ICD-10-CM

## 2020-10-14 DIAGNOSIS — I48.0 PAROXYSMAL ATRIAL FIBRILLATION (HCC): Primary | ICD-10-CM

## 2020-10-14 PROCEDURE — 99214 OFFICE O/P EST MOD 30 MIN: CPT | Performed by: INTERNAL MEDICINE

## 2020-10-14 NOTE — PROGRESS NOTES
Subjective   Elia Walsh is a 60 y.o. male.     Vitals:    10/14/20 1324   BP: 112/76   Pulse: 61   Temp: 99.1 °F (37.3 °C)   SpO2: 99%      Body mass index is 25.41 kg/m².     History of Present Illness   Patient was seen for paroxysmal atrial fibrillation.  Patient had a history of palpitations and Holter monitor showed paroxysmal atrial fibrillation.  Patient had several runs of atrial fibrillation on the monitor.  Patient was placed on Eliquis 5 mg twice daily and Lopressor 25 mg twice daily.  Patient's pulse 140 bpm.  Patient's blood pressure is running 112/76.  Patient's lipids are being treated with diet and exercise, statin with vascepa.  Triglycerides 129, HDL 42, LDL 80.  Patient will monitor his blood pressure and pulse and return to clinic in 1 month.    Dictated utilizing Dragon dictation. If there are questions or for further clarification, please contact me.  The following portions of the patient's history were reviewed and updated as appropriate: allergies, current medications, past family history, past medical history, past social history, past surgical history and problem list.    Review of Systems   Constitutional: Negative for fatigue and fever.   HENT: Positive for congestion. Negative for trouble swallowing.    Eyes: Negative for discharge and visual disturbance.   Respiratory: Negative for choking and shortness of breath.    Cardiovascular: Positive for palpitations. Negative for chest pain.   Gastrointestinal: Negative for abdominal pain and blood in stool.   Endocrine: Negative.    Genitourinary: Negative for genital sores and hematuria.   Musculoskeletal: Negative for gait problem and joint swelling.   Skin: Negative for color change, pallor, rash and wound.   Allergic/Immunologic: Positive for environmental allergies. Negative for immunocompromised state.   Neurological: Negative for facial asymmetry and speech difficulty.   Psychiatric/Behavioral: Negative for hallucinations and  suicidal ideas.       Objective   Physical Exam  Vitals signs and nursing note reviewed.   Constitutional:       Appearance: Normal appearance. He is well-developed.   HENT:      Head: Normocephalic and atraumatic.      Nose: Nose normal.      Mouth/Throat:      Mouth: Mucous membranes are moist.      Pharynx: Oropharynx is clear.   Eyes:      Extraocular Movements: Extraocular movements intact.      Conjunctiva/sclera: Conjunctivae normal.      Pupils: Pupils are equal, round, and reactive to light.   Neck:      Musculoskeletal: Normal range of motion and neck supple.   Cardiovascular:      Rate and Rhythm: Normal rate. Rhythm irregular.      Heart sounds: Normal heart sounds. No murmur. No friction rub. No gallop.    Pulmonary:      Effort: Pulmonary effort is normal. No respiratory distress.      Breath sounds: Normal breath sounds. No stridor. No wheezing, rhonchi or rales.   Chest:      Chest wall: No tenderness.   Abdominal:      General: Bowel sounds are normal.      Palpations: Abdomen is soft.   Musculoskeletal: Normal range of motion.   Skin:     General: Skin is warm and dry.   Neurological:      General: No focal deficit present.      Mental Status: He is alert and oriented to person, place, and time. Mental status is at baseline.   Psychiatric:         Mood and Affect: Mood normal.         Behavior: Behavior normal.         Thought Content: Thought content normal.         Judgment: Judgment normal.         Assessment/Plan 1 Eliquis 5 mg twice daily (Eliquis 10 mg twice daily x7 days, then 1 twice daily) #2 monitor blood pressure and pulse #3 labs  Problems Addressed this Visit     Cardiovascular and Mediastinum              Essential hypertension    Relevant Medications    metoprolol tartrate (LOPRESSOR) 25 MG tablet    Mixed hyperlipidemia    Paroxysmal atrial fibrillation (CMS/HCC) - Primary    Relevant Medications    metoprolol tartrate (LOPRESSOR) 25 MG tablet            Diagnoses     Diagnosis  Codes Comments    Paroxysmal atrial fibrillation (CMS/[facility])    -  Primary ICD-10-CM: I48.0  ICD-9-CM: 427.31     Essential hypertension     ICD-10-CM: I10  ICD-9-CM: 401.9     Mixed hyperlipidemia     ICD-10-CM: E78.2  ICD-9-CM: 272.2

## 2020-11-03 RX ORDER — OMEPRAZOLE 40 MG/1
CAPSULE, DELAYED RELEASE ORAL
Qty: 90 CAPSULE | Refills: 1 | Status: SHIPPED | OUTPATIENT
Start: 2020-11-03 | End: 2020-11-03 | Stop reason: SDUPTHER

## 2020-11-03 RX ORDER — OMEPRAZOLE 40 MG/1
40 CAPSULE, DELAYED RELEASE ORAL DAILY
Qty: 90 CAPSULE | Refills: 1 | Status: SHIPPED | OUTPATIENT
Start: 2020-11-03 | End: 2021-11-02 | Stop reason: SDUPTHER

## 2020-11-11 ENCOUNTER — OFFICE VISIT (OUTPATIENT)
Dept: FAMILY MEDICINE CLINIC | Facility: CLINIC | Age: 60
End: 2020-11-11

## 2020-11-11 VITALS
RESPIRATION RATE: 16 BRPM | SYSTOLIC BLOOD PRESSURE: 140 MMHG | WEIGHT: 196 LBS | DIASTOLIC BLOOD PRESSURE: 80 MMHG | HEIGHT: 73 IN | TEMPERATURE: 98.4 F | HEART RATE: 52 BPM | BODY MASS INDEX: 25.98 KG/M2 | OXYGEN SATURATION: 98 %

## 2020-11-11 DIAGNOSIS — I10 ESSENTIAL HYPERTENSION: ICD-10-CM

## 2020-11-11 DIAGNOSIS — E78.2 MIXED HYPERLIPIDEMIA: ICD-10-CM

## 2020-11-11 DIAGNOSIS — I48.0 PAROXYSMAL ATRIAL FIBRILLATION (HCC): Primary | ICD-10-CM

## 2020-11-11 PROCEDURE — 99214 OFFICE O/P EST MOD 30 MIN: CPT | Performed by: INTERNAL MEDICINE

## 2020-11-11 NOTE — PROGRESS NOTES
Subjective   Elia Walsh is a 60 y.o. male.     Vitals:    11/11/20 0955   BP: 140/80   Pulse: 52   Resp: 16   Temp: 98.4 °F (36.9 °C)   SpO2: 98%      Body mass index is 25.86 kg/m².     History of Present Illness   Patient was seen for paroxysmal atrial fibrillation.  Patient has been treated with apixaban 5 mg twice daily and metoprolo 25 mg twice daily..  Patient was scheduled for cardiology follow-up.  Patient's lipids are being treated with rosuvastatin 20 mg daily and Vascepa 2 g twice daily.  Patient was also using diet and exercise.  Patient's blood pressure was running 130s over 80s.  His pulse was running in the 50s.  Patient will continue present treatment and follow-up in 6 months.    Dictated utilizing Dragon dictation. If there are questions or for further clarification, please contact me.  The following portions of the patient's history were reviewed and updated as appropriate: allergies, current medications, past family history, past medical history, past social history, past surgical history and problem list.    Review of Systems   Constitutional: Negative for fatigue and fever.   HENT: Positive for congestion. Negative for trouble swallowing.    Eyes: Negative for discharge and visual disturbance.   Respiratory: Negative for choking and shortness of breath.    Cardiovascular: Negative for chest pain and palpitations.   Gastrointestinal: Negative for abdominal pain and blood in stool.   Endocrine: Negative.    Genitourinary: Negative for genital sores and hematuria.   Musculoskeletal: Negative for gait problem and joint swelling.   Skin: Negative for color change, pallor, rash and wound.   Allergic/Immunologic: Positive for environmental allergies. Negative for immunocompromised state.   Neurological: Negative for facial asymmetry and speech difficulty.   Psychiatric/Behavioral: Negative for hallucinations and suicidal ideas.       Objective   Physical Exam  Vitals signs and nursing note  reviewed.   Constitutional:       Appearance: Normal appearance. He is well-developed.   HENT:      Head: Normocephalic and atraumatic.      Nose: Nose normal.      Mouth/Throat:      Mouth: Mucous membranes are moist.      Pharynx: Oropharynx is clear.   Eyes:      Extraocular Movements: Extraocular movements intact.      Conjunctiva/sclera: Conjunctivae normal.      Pupils: Pupils are equal, round, and reactive to light.   Neck:      Musculoskeletal: Normal range of motion and neck supple.   Cardiovascular:      Rate and Rhythm: Normal rate. Rhythm irregular.      Heart sounds: Normal heart sounds. No murmur. No friction rub. No gallop.    Pulmonary:      Effort: Pulmonary effort is normal. No respiratory distress.      Breath sounds: Normal breath sounds. No stridor. No wheezing, rhonchi or rales.   Chest:      Chest wall: No tenderness.   Abdominal:      General: Bowel sounds are normal.      Palpations: Abdomen is soft.   Musculoskeletal: Normal range of motion.   Skin:     General: Skin is warm and dry.   Neurological:      General: No focal deficit present.      Mental Status: He is alert and oriented to person, place, and time. Mental status is at baseline.   Psychiatric:         Mood and Affect: Mood normal.         Behavior: Behavior normal.         Thought Content: Thought content normal.         Judgment: Judgment normal.         Assessment/Plan #1 continue apixaban 5 mg twice daily and Toprol 25 mg twice daily #2 continue present diet and activity level #3 cardiology referral  Problems Addressed this Visit     Cardiovascular and Mediastinum              Essential hypertension    Mixed hyperlipidemia    Paroxysmal atrial fibrillation (CMS/HCC) - Primary    Relevant Orders    Ambulatory Referral to Cardiology            Diagnoses     Diagnosis Codes Comments    Paroxysmal atrial fibrillation (CMS/HCC)    -  Primary ICD-10-CM: I48.0  ICD-9-CM: 427.31     Mixed hyperlipidemia     ICD-10-CM: E78.2  ICD-9-CM:  272.2     Essential hypertension     ICD-10-CM: I10  ICD-9-CM: 401.9

## 2020-11-13 RX ORDER — ICOSAPENT ETHYL 1000 MG/1
CAPSULE ORAL
Qty: 120 CAPSULE | Refills: 5 | Status: SHIPPED | OUTPATIENT
Start: 2020-11-13 | End: 2021-11-18

## 2020-11-13 RX ORDER — APIXABAN 5 MG (74)
KIT ORAL
Qty: 74 TABLET | Refills: 1 | Status: SHIPPED | OUTPATIENT
Start: 2020-11-13 | End: 2020-11-17

## 2020-11-17 ENCOUNTER — TELEPHONE (OUTPATIENT)
Dept: FAMILY MEDICINE CLINIC | Facility: CLINIC | Age: 60
End: 2020-11-17

## 2020-11-17 NOTE — TELEPHONE ENCOUNTER
Patient called in and stated he needs a refill of Eliquis  . Patient stated he has already done the starter pack and no longer needs that he needs just the pills .         Sent to AMINAH SANCHEZ [address] [place] [address] [address] [facility] & (AMY LIEBERMAN) - [phone] [facility] [phone]   [phone]        Patient call back [phone]

## 2020-11-24 ENCOUNTER — HOSPITAL ENCOUNTER (OUTPATIENT)
Dept: CARDIOLOGY | Facility: HOSPITAL | Age: 60
Discharge: HOME | End: 2020-11-24
Admitting: INTERNAL MEDICINE

## 2020-11-24 ENCOUNTER — OFFICE VISIT (OUTPATIENT)
Dept: CARDIOLOGY | Facility: CLINIC | Age: 60
End: 2020-11-24

## 2020-11-24 VITALS
SYSTOLIC BLOOD PRESSURE: 120 MMHG | BODY MASS INDEX: 26.82 KG/M2 | HEART RATE: 51 BPM | HEIGHT: 72 IN | WEIGHT: 198 LBS | DIASTOLIC BLOOD PRESSURE: 80 MMHG

## 2020-11-24 DIAGNOSIS — R06.83 SNORING: ICD-10-CM

## 2020-11-24 DIAGNOSIS — R40.0 DAYTIME SOMNOLENCE: ICD-10-CM

## 2020-11-24 DIAGNOSIS — R61 DIAPHORESIS: ICD-10-CM

## 2020-11-24 DIAGNOSIS — I48.0 PAROXYSMAL ATRIAL FIBRILLATION (HCC): ICD-10-CM

## 2020-11-24 DIAGNOSIS — R00.2 PALPITATIONS: ICD-10-CM

## 2020-11-24 DIAGNOSIS — R53.1 WEAKNESS: ICD-10-CM

## 2020-11-24 DIAGNOSIS — I48.0 PAF (PAROXYSMAL ATRIAL FIBRILLATION) (HCC): Primary | ICD-10-CM

## 2020-11-24 DIAGNOSIS — I48.0 PAF (PAROXYSMAL ATRIAL FIBRILLATION) (HCC): ICD-10-CM

## 2020-11-24 DIAGNOSIS — I10 ESSENTIAL HYPERTENSION: ICD-10-CM

## 2020-11-24 LAB — TSH SERPL DL<=0.05 MIU/L-ACNC: 2.25 UIU/ML (ref 0.27–4.2)

## 2020-11-24 PROCEDURE — 36415 COLL VENOUS BLD VENIPUNCTURE: CPT

## 2020-11-24 PROCEDURE — 84443 ASSAY THYROID STIM HORMONE: CPT | Performed by: INTERNAL MEDICINE

## 2020-11-24 PROCEDURE — 99204 OFFICE O/P NEW MOD 45 MIN: CPT | Performed by: INTERNAL MEDICINE

## 2020-11-24 PROCEDURE — 93000 ELECTROCARDIOGRAM COMPLETE: CPT | Performed by: INTERNAL MEDICINE

## 2020-11-30 ENCOUNTER — TELEPHONE (OUTPATIENT)
Dept: CARDIOLOGY | Facility: CLINIC | Age: 60
End: 2020-11-30

## 2020-12-06 PROBLEM — R06.83 SNORING: Status: ACTIVE | Noted: 2020-12-06

## 2020-12-06 PROBLEM — R00.2 PALPITATIONS: Status: ACTIVE | Noted: 2020-12-06

## 2020-12-06 PROBLEM — R40.0 DAYTIME SOMNOLENCE: Status: ACTIVE | Noted: 2020-12-06

## 2020-12-06 PROBLEM — R53.1 WEAKNESS: Status: ACTIVE | Noted: 2020-12-06

## 2020-12-06 PROBLEM — R61 DIAPHORESIS: Status: ACTIVE | Noted: 2020-12-06

## 2020-12-15 ENCOUNTER — TRANSCRIBE ORDERS (OUTPATIENT)
Dept: SLEEP MEDICINE | Facility: HOSPITAL | Age: 60
End: 2020-12-15

## 2020-12-15 DIAGNOSIS — Z01.818 OTHER SPECIFIED PRE-OPERATIVE EXAMINATION: Primary | ICD-10-CM

## 2020-12-17 ENCOUNTER — HOSPITAL ENCOUNTER (OUTPATIENT)
Dept: SLEEP MEDICINE | Facility: HOSPITAL | Age: 60
Discharge: HOME | End: 2020-12-17
Admitting: INTERNAL MEDICINE

## 2020-12-17 PROCEDURE — 95806 SLEEP STUDY UNATT&RESP EFFT: CPT | Performed by: INTERNAL MEDICINE

## 2020-12-17 PROCEDURE — 95806 SLEEP STUDY UNATT&RESP EFFT: CPT

## 2020-12-18 ENCOUNTER — LAB (OUTPATIENT)
Dept: LAB | Facility: HOSPITAL | Age: 60
End: 2020-12-18

## 2020-12-18 DIAGNOSIS — Z01.818 OTHER SPECIFIED PRE-OPERATIVE EXAMINATION: ICD-10-CM

## 2020-12-18 PROCEDURE — C9803 HOPD COVID-19 SPEC COLLECT: HCPCS

## 2020-12-18 PROCEDURE — U0004 COV-19 TEST NON-CDC HGH THRU: HCPCS

## 2020-12-19 LAB — SARS-COV-2 ORF1AB RESP QL NAA+PROBE: NOT DETECTED

## 2020-12-21 ENCOUNTER — HOSPITAL ENCOUNTER (OUTPATIENT)
Dept: CARDIOLOGY | Facility: HOSPITAL | Age: 60
Discharge: HOME | End: 2020-12-21

## 2020-12-21 VITALS — HEIGHT: 72 IN | BODY MASS INDEX: 26.87 KG/M2 | WEIGHT: 198.41 LBS

## 2020-12-21 VITALS
HEIGHT: 72 IN | HEART RATE: 58 BPM | WEIGHT: 198 LBS | BODY MASS INDEX: 26.82 KG/M2 | SYSTOLIC BLOOD PRESSURE: 110 MMHG | DIASTOLIC BLOOD PRESSURE: 70 MMHG | OXYGEN SATURATION: 98 %

## 2020-12-21 DIAGNOSIS — R53.1 WEAKNESS: ICD-10-CM

## 2020-12-21 DIAGNOSIS — R00.2 PALPITATIONS: ICD-10-CM

## 2020-12-21 DIAGNOSIS — I48.0 PAF (PAROXYSMAL ATRIAL FIBRILLATION) (HCC): ICD-10-CM

## 2020-12-21 DIAGNOSIS — R61 DIAPHORESIS: ICD-10-CM

## 2020-12-21 DIAGNOSIS — R40.0 DAYTIME SOMNOLENCE: ICD-10-CM

## 2020-12-21 DIAGNOSIS — R06.83 SNORING: ICD-10-CM

## 2020-12-21 LAB
BH CV NUCLEAR PRIOR STUDY: 3
BH CV STRESS BP STAGE 1: NORMAL
BH CV STRESS COMMENTS STAGE 1: NORMAL
BH CV STRESS DOSE REGADENOSON STAGE 1: 0.4
BH CV STRESS DURATION MIN STAGE 1: 0
BH CV STRESS DURATION SEC STAGE 1: 10
BH CV STRESS HR STAGE 1: 85
BH CV STRESS PROTOCOL 1: NORMAL
BH CV STRESS RECOVERY BP: NORMAL MMHG
BH CV STRESS RECOVERY HR: 67 BPM
BH CV STRESS STAGE 1: 1
LV EF NUC BP: 57 %
MAXIMAL PREDICTED HEART RATE: 158 BPM
PERCENT MAX PREDICTED HR: 53.8 %
STRESS BASELINE BP: NORMAL MMHG
STRESS BASELINE HR: 50 BPM
STRESS PERCENT HR: 63 %
STRESS POST EXERCISE DUR SEC: 10 SEC
STRESS POST PEAK BP: NORMAL MMHG
STRESS POST PEAK HR: 85 BPM
STRESS TARGET HR: 134 BPM

## 2020-12-21 PROCEDURE — 93018 CV STRESS TEST I&R ONLY: CPT | Performed by: INTERNAL MEDICINE

## 2020-12-21 PROCEDURE — 93306 TTE W/DOPPLER COMPLETE: CPT | Performed by: INTERNAL MEDICINE

## 2020-12-21 PROCEDURE — 0 TECHNETIUM TETROFOSMIN KIT: Performed by: INTERNAL MEDICINE

## 2020-12-21 PROCEDURE — 93016 CV STRESS TEST SUPVJ ONLY: CPT | Performed by: INTERNAL MEDICINE

## 2020-12-21 PROCEDURE — 78452 HT MUSCLE IMAGE SPECT MULT: CPT

## 2020-12-21 PROCEDURE — 78452 HT MUSCLE IMAGE SPECT MULT: CPT | Performed by: INTERNAL MEDICINE

## 2020-12-21 PROCEDURE — 93306 TTE W/DOPPLER COMPLETE: CPT

## 2020-12-21 PROCEDURE — A9502 TC99M TETROFOSMIN: HCPCS | Performed by: INTERNAL MEDICINE

## 2020-12-21 PROCEDURE — 25010000002 REGADENOSON 0.4 MG/5ML SOLUTION: Performed by: INTERNAL MEDICINE

## 2020-12-21 PROCEDURE — 93017 CV STRESS TEST TRACING ONLY: CPT

## 2020-12-21 RX ADMIN — TETROFOSMIN 1 DOSE: 1.38 INJECTION, POWDER, LYOPHILIZED, FOR SOLUTION INTRAVENOUS at 07:59

## 2020-12-21 RX ADMIN — REGADENOSON 0.4 MG: 0.08 INJECTION, SOLUTION INTRAVENOUS at 08:47

## 2020-12-21 RX ADMIN — TETROFOSMIN 1 DOSE: 1.38 INJECTION, POWDER, LYOPHILIZED, FOR SOLUTION INTRAVENOUS at 08:46

## 2020-12-22 LAB
AORTIC ARCH: 2.6 CM
ASCENDING AORTA: 3.3 CM
BH CV ECHO MEAS - ACS: 2.4 CM
BH CV ECHO MEAS - AO MAX PG (FULL): 1.5 MMHG
BH CV ECHO MEAS - AO MAX PG: 5.6 MMHG
BH CV ECHO MEAS - AO MEAN PG (FULL): 0.98 MMHG
BH CV ECHO MEAS - AO MEAN PG: 3.1 MMHG
BH CV ECHO MEAS - AO ROOT AREA (BSA CORRECTED): 1.6
BH CV ECHO MEAS - AO ROOT AREA: 9.2 CM^2
BH CV ECHO MEAS - AO ROOT DIAM: 3.4 CM
BH CV ECHO MEAS - AO V2 MAX: 118.1 CM/SEC
BH CV ECHO MEAS - AO V2 MEAN: 82.7 CM/SEC
BH CV ECHO MEAS - AO V2 VTI: 26.5 CM
BH CV ECHO MEAS - ASC AORTA: 3.3 CM
BH CV ECHO MEAS - AVA(I,A): 2.4 CM^2
BH CV ECHO MEAS - AVA(I,D): 2.4 CM^2
BH CV ECHO MEAS - AVA(V,A): 2.5 CM^2
BH CV ECHO MEAS - AVA(V,D): 2.5 CM^2
BH CV ECHO MEAS - BSA(HAYCOCK): 2.1 M^2
BH CV ECHO MEAS - BSA: 2.1 M^2
BH CV ECHO MEAS - BZI_BMI: 26.9 KILOGRAMS/M^2
BH CV ECHO MEAS - BZI_METRIC_HEIGHT: 182.9 CM
BH CV ECHO MEAS - BZI_METRIC_WEIGHT: 89.8 KG
BH CV ECHO MEAS - EDV(MOD-SP2): 76 ML
BH CV ECHO MEAS - EDV(MOD-SP4): 77 ML
BH CV ECHO MEAS - EDV(TEICH): 138.6 ML
BH CV ECHO MEAS - EF(CUBED): 70.9 %
BH CV ECHO MEAS - EF(MOD-BP): 62 %
BH CV ECHO MEAS - EF(MOD-SP2): 64.5 %
BH CV ECHO MEAS - EF(MOD-SP4): 59.7 %
BH CV ECHO MEAS - EF(TEICH): 62.1 %
BH CV ECHO MEAS - EF_3D-VOL: 68 %
BH CV ECHO MEAS - ESV(MOD-SP2): 27 ML
BH CV ECHO MEAS - ESV(MOD-SP4): 31 ML
BH CV ECHO MEAS - ESV(TEICH): 52.5 ML
BH CV ECHO MEAS - FS: 33.8 %
BH CV ECHO MEAS - IVS/LVPW: 1
BH CV ECHO MEAS - IVSD: 1.2 CM
BH CV ECHO MEAS - LAT PEAK E' VEL: 7.6 CM/SEC
BH CV ECHO MEAS - LV DIASTOLIC VOL/BSA (35-75): 36.3 ML/M^2
BH CV ECHO MEAS - LV MASS(C)D: 252.8 GRAMS
BH CV ECHO MEAS - LV MASS(C)DI: 119.2 GRAMS/M^2
BH CV ECHO MEAS - LV MAX PG: 4.1 MMHG
BH CV ECHO MEAS - LV MEAN PG: 2.2 MMHG
BH CV ECHO MEAS - LV SYSTOLIC VOL/BSA (12-30): 14.6 ML/M^2
BH CV ECHO MEAS - LV V1 MAX: 101.4 CM/SEC
BH CV ECHO MEAS - LV V1 MEAN: 67.1 CM/SEC
BH CV ECHO MEAS - LV V1 VTI: 21.3 CM
BH CV ECHO MEAS - LVIDD: 5.4 CM
BH CV ECHO MEAS - LVIDS: 3.5 CM
BH CV ECHO MEAS - LVLD AP2: 8 CM
BH CV ECHO MEAS - LVLD AP4: 7.5 CM
BH CV ECHO MEAS - LVLS AP2: 6.3 CM
BH CV ECHO MEAS - LVLS AP4: 5.9 CM
BH CV ECHO MEAS - LVOT AREA (M): 2.8 CM^2
BH CV ECHO MEAS - LVOT AREA: 2.9 CM^2
BH CV ECHO MEAS - LVOT DIAM: 1.9 CM
BH CV ECHO MEAS - LVPWD: 1.2 CM
BH CV ECHO MEAS - MED PEAK E' VEL: 7.5 CM/SEC
BH CV ECHO MEAS - MV A DUR: 0.13 SEC
BH CV ECHO MEAS - MV A MAX VEL: 72.2 CM/SEC
BH CV ECHO MEAS - MV DEC SLOPE: 393.4 CM/SEC^2
BH CV ECHO MEAS - MV DEC TIME: 0.2 SEC
BH CV ECHO MEAS - MV E MAX VEL: 84.2 CM/SEC
BH CV ECHO MEAS - MV E/A: 1.2
BH CV ECHO MEAS - MV MAX PG: 3.5 MMHG
BH CV ECHO MEAS - MV MEAN PG: 1.4 MMHG
BH CV ECHO MEAS - MV P1/2T MAX VEL: 92.1 CM/SEC
BH CV ECHO MEAS - MV P1/2T: 68.6 MSEC
BH CV ECHO MEAS - MV V2 MAX: 94.1 CM/SEC
BH CV ECHO MEAS - MV V2 MEAN: 54.8 CM/SEC
BH CV ECHO MEAS - MV V2 VTI: 39.6 CM
BH CV ECHO MEAS - MVA P1/2T LCG: 2.4 CM^2
BH CV ECHO MEAS - MVA(P1/2T): 3.2 CM^2
BH CV ECHO MEAS - MVA(VTI): 1.6 CM^2
BH CV ECHO MEAS - PA ACC TIME: 0.08 SEC
BH CV ECHO MEAS - PA MAX PG (FULL): 1.3 MMHG
BH CV ECHO MEAS - PA MAX PG: 4 MMHG
BH CV ECHO MEAS - PA PR(ACCEL): 44.1 MMHG
BH CV ECHO MEAS - PA V2 MAX: 100.4 CM/SEC
BH CV ECHO MEAS - PULM A REVS DUR: 0.12 SEC
BH CV ECHO MEAS - PULM A REVS VEL: 26.2 CM/SEC
BH CV ECHO MEAS - PULM DIAS VEL: 39.5 CM/SEC
BH CV ECHO MEAS - PULM S/D: 1.3
BH CV ECHO MEAS - PULM SYS VEL: 50 CM/SEC
BH CV ECHO MEAS - PVA(V,A): 2.8 CM^2
BH CV ECHO MEAS - PVA(V,D): 2.8 CM^2
BH CV ECHO MEAS - QP/QS: 1.3
BH CV ECHO MEAS - RAP SYSTOLE: 3 MMHG
BH CV ECHO MEAS - RV MAX PG: 2.8 MMHG
BH CV ECHO MEAS - RV MEAN PG: 1.6 MMHG
BH CV ECHO MEAS - RV V1 MAX: 83.1 CM/SEC
BH CV ECHO MEAS - RV V1 MEAN: 58.6 CM/SEC
BH CV ECHO MEAS - RV V1 VTI: 23.5 CM
BH CV ECHO MEAS - RVOT AREA: 3.4 CM^2
BH CV ECHO MEAS - RVOT DIAM: 2.1 CM
BH CV ECHO MEAS - RVSP: 15.5 MMHG
BH CV ECHO MEAS - SI(AO): 115.5 ML/M^2
BH CV ECHO MEAS - SI(CUBED): 51.3 ML/M^2
BH CV ECHO MEAS - SI(LVOT): 29.4 ML/M^2
BH CV ECHO MEAS - SI(MOD-SP2): 23.1 ML/M^2
BH CV ECHO MEAS - SI(MOD-SP4): 21.7 ML/M^2
BH CV ECHO MEAS - SI(TEICH): 40.6 ML/M^2
BH CV ECHO MEAS - SUP REN AO DIAM: 2 CM
BH CV ECHO MEAS - SV(AO): 245.1 ML
BH CV ECHO MEAS - SV(CUBED): 108.9 ML
BH CV ECHO MEAS - SV(LVOT): 62.3 ML
BH CV ECHO MEAS - SV(MOD-SP2): 49 ML
BH CV ECHO MEAS - SV(MOD-SP4): 46 ML
BH CV ECHO MEAS - SV(RVOT): 80 ML
BH CV ECHO MEAS - SV(TEICH): 86.1 ML
BH CV ECHO MEAS - TAPSE (>1.6): 2.1 CM
BH CV ECHO MEAS - TR MAX VEL: 176.5 CM/SEC
BH CV ECHO MEASUREMENTS AVERAGE E/E' RATIO: 11.15
BH CV VAS BP RIGHT ARM: NORMAL MMHG
BH CV XLRA - RV BASE: 3.2 CM
BH CV XLRA - RV LENGTH: 7.5 CM
BH CV XLRA - RV MID: 2.8 CM
BH CV XLRA - TDI S': 11.3 CM/SEC
LEFT ATRIUM VOLUME INDEX: 17 ML/M2
MAXIMAL PREDICTED HEART RATE: 158 BPM
SINUS: 3 CM
STJ: 3.2 CM
STRESS TARGET HR: 134 BPM

## 2020-12-28 ENCOUNTER — TELEPHONE (OUTPATIENT)
Dept: SLEEP MEDICINE | Facility: HOSPITAL | Age: 60
End: 2020-12-28

## 2020-12-29 ENCOUNTER — TELEPHONE (OUTPATIENT)
Dept: CARDIOLOGY | Facility: CLINIC | Age: 60
End: 2020-12-29

## 2020-12-29 NOTE — TELEPHONE ENCOUNTER
Dr. Fischer in the office this week.  Home sleep study shows at least mild sleep apnea.  Called and discussed with patient.  He has an appointment tomorrow with sleep medicine that he will keep.

## 2020-12-30 ENCOUNTER — OFFICE VISIT (OUTPATIENT)
Dept: SLEEP MEDICINE | Facility: HOSPITAL | Age: 60
End: 2020-12-30

## 2020-12-30 VITALS — BODY MASS INDEX: 27.47 KG/M2 | HEIGHT: 72 IN | WEIGHT: 202.8 LBS | HEART RATE: 57 BPM | OXYGEN SATURATION: 98 %

## 2020-12-30 DIAGNOSIS — G47.33 OSA (OBSTRUCTIVE SLEEP APNEA): Primary | ICD-10-CM

## 2020-12-30 DIAGNOSIS — G47.14 HYPERSOMNIA DUE TO MEDICAL CONDITION: ICD-10-CM

## 2020-12-30 PROCEDURE — G0463 HOSPITAL OUTPT CLINIC VISIT: HCPCS

## 2020-12-30 PROCEDURE — 99244 OFF/OP CNSLTJ NEW/EST MOD 40: CPT | Performed by: INTERNAL MEDICINE

## 2021-01-03 PROBLEM — G47.33 OSA (OBSTRUCTIVE SLEEP APNEA): Status: ACTIVE | Noted: 2020-12-17

## 2021-01-03 PROBLEM — G47.14 HYPERSOMNIA DUE TO MEDICAL CONDITION: Status: ACTIVE | Noted: 2021-01-03

## 2021-01-11 RX ORDER — LISINOPRIL 20 MG/1
TABLET ORAL
Qty: 90 TABLET | Refills: 1 | Status: SHIPPED | OUTPATIENT
Start: 2021-01-11 | End: 2022-01-04

## 2021-01-11 RX ORDER — LISINOPRIL 20 MG/1
TABLET ORAL
Qty: 90 TABLET | Refills: 1 | Status: SHIPPED | OUTPATIENT
Start: 2021-01-11 | End: 2021-03-11 | Stop reason: SDUPTHER

## 2021-01-25 ENCOUNTER — TELEPHONE (OUTPATIENT)
Dept: GASTROENTEROLOGY | Facility: CLINIC | Age: 61
End: 2021-01-25

## 2021-01-29 DIAGNOSIS — D12.6 ADENOMATOUS POLYP OF COLON, UNSPECIFIED PART OF COLON: Primary | ICD-10-CM

## 2021-02-02 ENCOUNTER — TELEPHONE (OUTPATIENT)
Dept: GASTROENTEROLOGY | Facility: CLINIC | Age: 61
End: 2021-02-02

## 2021-02-22 RX ORDER — ROSUVASTATIN CALCIUM 20 MG/1
TABLET, COATED ORAL
Qty: 90 TABLET | Refills: 1 | Status: SHIPPED | OUTPATIENT
Start: 2021-02-22 | End: 2021-08-30

## 2021-02-26 ENCOUNTER — LAB REQUISITION (OUTPATIENT)
Dept: LAB | Facility: HOSPITAL | Age: 61
End: 2021-02-26

## 2021-02-26 DIAGNOSIS — Z00.00 ENCOUNTER FOR GENERAL ADULT MEDICAL EXAMINATION WITHOUT ABNORMAL FINDINGS: ICD-10-CM

## 2021-02-26 PROCEDURE — U0004 COV-19 TEST NON-CDC HGH THRU: HCPCS | Performed by: INTERNAL MEDICINE

## 2021-02-27 LAB — SARS-COV-2 ORF1AB RESP QL NAA+PROBE: NOT DETECTED

## 2021-03-03 ENCOUNTER — OUTSIDE FACILITY SERVICE (OUTPATIENT)
Dept: GASTROENTEROLOGY | Facility: CLINIC | Age: 61
End: 2021-03-03

## 2021-03-03 PROCEDURE — 45378 DIAGNOSTIC COLONOSCOPY: CPT | Performed by: INTERNAL MEDICINE

## 2021-03-04 ENCOUNTER — LAB (OUTPATIENT)
Dept: FAMILY MEDICINE CLINIC | Facility: CLINIC | Age: 61
End: 2021-03-04

## 2021-03-04 DIAGNOSIS — Z12.5 PROSTATE CANCER SCREENING: ICD-10-CM

## 2021-03-04 DIAGNOSIS — E78.2 MIXED HYPERLIPIDEMIA: ICD-10-CM

## 2021-03-04 DIAGNOSIS — I10 ESSENTIAL HYPERTENSION: ICD-10-CM

## 2021-03-04 LAB
25(OH)D3 SERPL-MCNC: 39.4 NG/ML (ref 30–100)
ALBUMIN SERPL-MCNC: 4.5 G/DL (ref 3.5–5.2)
ALBUMIN/GLOB SERPL: 1.6 G/DL
ALP SERPL-CCNC: 80 U/L (ref 39–117)
ALT SERPL W P-5'-P-CCNC: 26 U/L (ref 1–41)
ANION GAP SERPL CALCULATED.3IONS-SCNC: 7.8 MMOL/L (ref 5–15)
AST SERPL-CCNC: 21 U/L (ref 1–40)
BILIRUB SERPL-MCNC: 0.6 MG/DL (ref 0–1.2)
BUN SERPL-MCNC: 15 MG/DL (ref 8–23)
BUN/CREAT SERPL: 15 (ref 7–25)
CALCIUM SPEC-SCNC: 9.5 MG/DL (ref 8.6–10.5)
CHLORIDE SERPL-SCNC: 103 MMOL/L (ref 98–107)
CHOLEST SERPL-MCNC: 154 MG/DL (ref 0–200)
CO2 SERPL-SCNC: 28.2 MMOL/L (ref 22–29)
CREAT SERPL-MCNC: 1 MG/DL (ref 0.76–1.27)
DEPRECATED RDW RBC AUTO: 46.4 FL (ref 37–54)
ERYTHROCYTE [DISTWIDTH] IN BLOOD BY AUTOMATED COUNT: 13.2 % (ref 12.3–15.4)
GFR SERPL CREATININE-BSD FRML MDRD: 76 ML/MIN/1.73
GLOBULIN UR ELPH-MCNC: 2.9 GM/DL
GLUCOSE SERPL-MCNC: 118 MG/DL (ref 65–99)
HCT VFR BLD AUTO: 46.6 % (ref 37.5–51)
HDLC SERPL-MCNC: 38 MG/DL (ref 40–60)
HGB BLD-MCNC: 16 G/DL (ref 13–17.7)
LDLC SERPL CALC-MCNC: 96 MG/DL (ref 0–100)
LDLC/HDLC SERPL: 2.48 {RATIO}
MCH RBC QN AUTO: 32.7 PG (ref 26.6–33)
MCHC RBC AUTO-ENTMCNC: 34.3 G/DL (ref 31.5–35.7)
MCV RBC AUTO: 95.3 FL (ref 79–97)
PLATELET # BLD AUTO: 248 10*3/MM3 (ref 140–450)
PMV BLD AUTO: 10.7 FL (ref 6–12)
POTASSIUM SERPL-SCNC: 4.6 MMOL/L (ref 3.5–5.2)
PROT SERPL-MCNC: 7.4 G/DL (ref 6–8.5)
PSA SERPL-MCNC: 2.02 NG/ML (ref 0–4)
RBC # BLD AUTO: 4.89 10*6/MM3 (ref 4.14–5.8)
SODIUM SERPL-SCNC: 139 MMOL/L (ref 136–145)
TRIGL SERPL-MCNC: 109 MG/DL (ref 0–150)
VLDLC SERPL-MCNC: 20 MG/DL (ref 5–40)
WBC # BLD AUTO: 7.59 10*3/MM3 (ref 3.4–10.8)

## 2021-03-04 PROCEDURE — 85027 COMPLETE CBC AUTOMATED: CPT | Performed by: INTERNAL MEDICINE

## 2021-03-04 PROCEDURE — G0103 PSA SCREENING: HCPCS | Performed by: INTERNAL MEDICINE

## 2021-03-04 PROCEDURE — 36415 COLL VENOUS BLD VENIPUNCTURE: CPT | Performed by: INTERNAL MEDICINE

## 2021-03-04 PROCEDURE — 80061 LIPID PANEL: CPT | Performed by: INTERNAL MEDICINE

## 2021-03-04 PROCEDURE — 80053 COMPREHEN METABOLIC PANEL: CPT | Performed by: INTERNAL MEDICINE

## 2021-03-04 PROCEDURE — 82306 VITAMIN D 25 HYDROXY: CPT | Performed by: INTERNAL MEDICINE

## 2021-03-11 ENCOUNTER — OFFICE VISIT (OUTPATIENT)
Dept: FAMILY MEDICINE CLINIC | Facility: CLINIC | Age: 61
End: 2021-03-11

## 2021-03-11 VITALS
DIASTOLIC BLOOD PRESSURE: 84 MMHG | SYSTOLIC BLOOD PRESSURE: 140 MMHG | TEMPERATURE: 99.3 F | OXYGEN SATURATION: 99 % | HEIGHT: 72 IN | HEART RATE: 57 BPM | BODY MASS INDEX: 27.63 KG/M2 | WEIGHT: 204 LBS

## 2021-03-11 DIAGNOSIS — E78.2 MIXED HYPERLIPIDEMIA: ICD-10-CM

## 2021-03-11 DIAGNOSIS — I48.0 PAF (PAROXYSMAL ATRIAL FIBRILLATION) (HCC): ICD-10-CM

## 2021-03-11 DIAGNOSIS — I10 ESSENTIAL HYPERTENSION: Primary | ICD-10-CM

## 2021-03-11 PROBLEM — H40.9 GLAUCOMA: Status: ACTIVE | Noted: 2021-03-11

## 2021-03-11 PROCEDURE — 99214 OFFICE O/P EST MOD 30 MIN: CPT | Performed by: INTERNAL MEDICINE

## 2021-03-11 RX ORDER — NETARSUDIL AND LATANOPROST OPHTHALMIC SOLUTION, 0.02%/0.005% .2; .05 MG/ML; MG/ML
SOLUTION/ DROPS OPHTHALMIC; TOPICAL NIGHTLY
COMMUNITY
Start: 2021-02-04

## 2021-03-11 NOTE — PROGRESS NOTES
Subjective   Elia Walsh is a 60 y.o. male.     Vitals:    03/11/21 0839   BP: 140/84   Pulse: 57   Temp: 99.3 °F (37.4 °C)   SpO2: 99%      Body mass index is 27.67 kg/m².     History of Present Illness   Patient was seen for hypertension.  Blood pressures been running 130s over 80s at home.  Patient will continue lisinopril 20 mg daily with metoprolol 25 mg twice daily.  Patient's lipids are being treated with rosuvastatin 20 mg daily.  Triglycerides 109, HDL 38, LDL 96.  Patient will continue present diet and activity levels.  Patient does have paroxysmal atrial fibrillation.  Patient is using Eliquis 5 mg twice daily for clot control and metoprolol 25 mg twice daily for rate control.    Dictated utilizing Dragon dictation. If there are questions or for further clarification, please contact me.  The following portions of the patient's history were reviewed and updated as appropriate: allergies, current medications, past family history, past medical history, past social history, past surgical history and problem list.    Review of Systems   Constitutional: Negative for fatigue and fever.   HENT: Positive for congestion. Negative for trouble swallowing.    Eyes: Negative for discharge and visual disturbance.   Respiratory: Negative for choking and shortness of breath.    Cardiovascular: Negative for chest pain and palpitations.   Gastrointestinal: Negative for abdominal pain and blood in stool.   Endocrine: Negative.    Genitourinary: Negative for genital sores and hematuria.   Musculoskeletal: Negative for gait problem and joint swelling.   Skin: Negative for color change, pallor, rash and wound.   Allergic/Immunologic: Positive for environmental allergies. Negative for immunocompromised state.   Neurological: Negative for facial asymmetry and speech difficulty.   Psychiatric/Behavioral: Negative for hallucinations and suicidal ideas.       Objective   Physical Exam  Vitals and nursing note reviewed.    Constitutional:       Appearance: Normal appearance. He is well-developed.   HENT:      Head: Normocephalic and atraumatic.      Nose: Nose normal.      Mouth/Throat:      Mouth: Mucous membranes are moist.      Pharynx: Oropharynx is clear.   Eyes:      Extraocular Movements: Extraocular movements intact.      Conjunctiva/sclera: Conjunctivae normal.      Pupils: Pupils are equal, round, and reactive to light.   Cardiovascular:      Rate and Rhythm: Normal rate and regular rhythm.      Heart sounds: Normal heart sounds. No murmur. No friction rub. No gallop.    Pulmonary:      Effort: Pulmonary effort is normal. No respiratory distress.      Breath sounds: Normal breath sounds. No stridor. No wheezing, rhonchi or rales.   Chest:      Chest wall: No tenderness.   Abdominal:      General: Bowel sounds are normal.      Palpations: Abdomen is soft.   Musculoskeletal:         General: Normal range of motion.      Cervical back: Normal range of motion and neck supple.   Skin:     General: Skin is warm and dry.   Neurological:      General: No focal deficit present.      Mental Status: He is alert and oriented to person, place, and time. Mental status is at baseline.   Psychiatric:         Mood and Affect: Mood normal.         Behavior: Behavior normal.         Thought Content: Thought content normal.         Judgment: Judgment normal.         Assessment/Plan #1 continue monitor blood pressure at home #2 continue present treatment for atrial fibrillation #3 continue present treatment for lipids  Problems Addressed this Visit     Cardiac and Vasculature            Essential hypertension - Primary    Mixed hyperlipidemia    PAF (paroxysmal atrial fibrillation) (CMS/[facility])            Diagnoses     Diagnosis Codes Comments    Essential hypertension    -  Primary ICD-10-CM: I10  ICD-9-CM: 401.9     Mixed hyperlipidemia     ICD-10-CM: E78.2  ICD-9-CM: 272.2     PAF (paroxysmal atrial fibrillation) (CMS/[facility])     ICD-10-CM:  I48.0  ICD-9-CM: 427.31

## 2021-03-16 ENCOUNTER — BULK ORDERING (OUTPATIENT)
Dept: CASE MANAGEMENT | Facility: OTHER | Age: 61
End: 2021-03-16

## 2021-03-16 DIAGNOSIS — Z23 IMMUNIZATION DUE: ICD-10-CM

## 2021-05-27 ENCOUNTER — OFFICE VISIT (OUTPATIENT)
Dept: CARDIOLOGY | Facility: CLINIC | Age: 61
End: 2021-05-27

## 2021-05-27 VITALS
HEIGHT: 72 IN | HEART RATE: 55 BPM | DIASTOLIC BLOOD PRESSURE: 84 MMHG | WEIGHT: 200 LBS | BODY MASS INDEX: 27.09 KG/M2 | SYSTOLIC BLOOD PRESSURE: 140 MMHG

## 2021-05-27 DIAGNOSIS — Z79.01 CHRONIC ANTICOAGULATION: ICD-10-CM

## 2021-05-27 DIAGNOSIS — I48.0 PAF (PAROXYSMAL ATRIAL FIBRILLATION) (HCC): Primary | ICD-10-CM

## 2021-05-27 DIAGNOSIS — I10 ESSENTIAL HYPERTENSION: ICD-10-CM

## 2021-05-27 PROCEDURE — 93000 ELECTROCARDIOGRAM COMPLETE: CPT | Performed by: NURSE PRACTITIONER

## 2021-05-27 PROCEDURE — 99214 OFFICE O/P EST MOD 30 MIN: CPT | Performed by: NURSE PRACTITIONER

## 2021-05-27 NOTE — PROGRESS NOTES
Date of Office Visit: 2021  Encounter Provider: Anali Parra, WAYNE, APRN  Place of Service: [facility] CARDIOLOGY  Patient Name: Elia Walsh  :1960        Subjective:     Chief Complaint:  Paroxysmal atrial fibrillation, hypertension      History of Present Illness:  Elia Walsh is a 61 y.o. male patient of Dr. Fischer.  This patient is new to me and I have reviewed his records.    Patient has a history of paroxysmal atrial fibrillation, chronic anticoagulation, hypertension, hyperlipidemia, GERD, gastritis, rectal bleeding, internal hemorrhoids, sleep apnea.    Patient reported palpitations lasting up to an hour at a time with diaphoresis and weakness and primary care provider placed a 2-week monitor study that showed paroxysmal atrial fibrillation that would last almost 7 hours at a time.  Average rates were 91 and increased to 193.  He was asymptomatic with these.  He was started on Eliquis and metoprolol and referred to cardiology.  He saw Dr. Fischer 2020 at which point he was consuming quite a bit of caffeine and alcohol and was sedentary and also with suspected sleep apnea.  Echo, stress test, and home sleep study were recommended.  Echo 2020 showed normal LV systolic function with EF of 61 to 65%, mild concentric LVH, no significant valvular stenosis or regurgitation.  Stress test 2020 showed normal myocardial perfusion imaging without evidence of ischemia, considered a low risk study.  Home sleep study showed mild obstructive sleep apnea.  Colonoscopy 3/2021 showed some internal hemorrhoids and repeat study was recommended in 5 years.  CBC 3/2021 showed no anemia.  CMP showed normal creatinine.      Patient presents to office today for follow-up appointment.  Patient reports he is doing well overall since last visit.  He is not doing formal exercise but he does stay active.  He golfs about once a week though uses a cart to try to get as much walking  and with this as he can.  He also stays active.  He has some mild shortness of breath with exertion which is chronic and unchanged.  Might get some occasional mild lightheadedness with rapid position changes but nothing new or worsening or significant.  Denies any chest pain or discomfort, palpitations, racing heartbeat sensation, lower extremity edema, syncope, near syncope, or falls.  He might get a rare blood on the toilet paper with wiping which is not new or worsening with history of hemorrhoids and follows with Dr. Haddad who is okay with him staying on blood thinner.  Blood pressure at home may be around what it is running today or lower.  Has not been checking regularly recently.  He has a dental device for sleep apnea but unfortunately has not been using this.  Continues have chronic fatigue which is unchanged.        Past Medical History:   Diagnosis Date   • Allergic    • Cataract    • DJD (degenerative joint disease)    • Gastritis    • GERD (gastroesophageal reflux disease)    • Hyperlipidemia    • Hypertension    • POLY (obstructive sleep apnea) 12/17/2020    Home sleep study.  Mild POLY with AHI 7.4 events per hour, possibly underestimated.  No sleep-related hypoxia.  The patient snored 14% of the total monitoring time.   • PAF (paroxysmal atrial fibrillation) (CMS/HCC)    • Rectal bleeding      Past Surgical History:   Procedure Laterality Date   • COLONOSCOPY      2013   • COLONOSCOPY  04/16/2013    Jeffrey Ascencio MD   • COLONOSCOPY  09/29/2006    Jeffrey Ascencio MD   • ENDOSCOPY  05/28/2015    With biopsies, Al Haddad MD   • ENDOSCOPY  09/29/2006    With biopsies, Jeffrey Ascencio MD   • HEMORRHOIDECTOMY     • HERNIA REPAIR      X2     Outpatient Medications Prior to Visit   Medication Sig Dispense Refill   • apixaban (ELIQUIS) 5 MG tablet tablet Take 1 tablet by mouth Every 12 (Twelve) Hours. 60 tablet 3   • finasteride (PROSCAR) 5 MG tablet      • lisinopril (PRINIVIL,ZESTRIL) 20 MG  tablet TAKE ONE TABLET BY MOUTH DAILY 90 tablet 1   • metoprolol tartrate (LOPRESSOR) 25 MG tablet TAKE ONE TABLET BY MOUTH TWICE A [id number] 60 tablet 3   • Misc Natural Products (OSTEO BI-FLEX ADV DOUBLE ST PO) Take  by mouth Daily.     • Multiple Vitamins-Minerals (MULTIVITAL PO) Take  by mouth Daily.     • omeprazole (priLOSEC) 40 MG capsule Take 1 capsule by mouth Daily. (Patient taking differently: Take 40 mg by mouth Every Other Day.) 90 capsule 1   • Rocklatan 0.02-0.005 % solution      • rosuvastatin (CRESTOR) 20 MG tablet TAKE ONE TABLET BY MOUTH DAILY 90 tablet 1   • tamsulosin (FLOMAX) 0.4 MG capsule 24 hr capsule 1 capsule Daily.     • timolol (TIMOPTIC) 0.5 % ophthalmic solution Administer 1 drop to both eyes 2 (Two) Times a Day.     • Vascepa 1 g capsule capsule TAKE TWO CAPSULES BY MOUTH TWICE A DAY WITH MEALS 120 capsule 5     No facility-administered medications prior to visit.       Allergies as of 2021   • (No Known Allergies)     Social History     Socioeconomic History   • Marital status:      Spouse name: Not on file   • Number of children: 4   • Years of education: Not on file   • Highest education level: Not on file   Tobacco Use   • Smoking status: Former Smoker     Types: Cigarettes     Quit date: 1993     Years since quittin.0   • Smokeless tobacco: Never Used   Substance and Sexual Activity   • Alcohol use: Yes     Comment: socially/4 or 5/week; Daily caffeine use   • Drug use: Yes     Types: Marijuana     Comment: for glaucoma   • Sexual activity: Defer     Family History   Problem Relation Age of Onset   • Hypertension Mother 54   • Aneurysm Father 70       Review of Systems   Constitutional: Positive for malaise/fatigue and weight gain. Negative for chills and fever.   Cardiovascular:        SEE HPI   Respiratory: Negative for cough and wheezing.    Hematologic/Lymphatic: Negative for bleeding problem.   Musculoskeletal: Negative for falls.   Gastrointestinal:  "Positive for hemorrhoids. Negative for melena.   Genitourinary: Negative for hematuria.   Neurological: Negative for dizziness.   Psychiatric/Behavioral: Negative for altered mental status.          Objective:     Vitals:    05/27/21 0858   BP: 140/84   Pulse: 55   Weight: 90.7 kg (200 lb)   Height: 182.9 cm (72\")     Body mass index is 27.12 kg/m².      PHYSICAL EXAM:  Constitutional:       General: Not in acute distress.     Appearance: Well-developed. Not diaphoretic.   Eyes:      Pupils: Pupils are equal, round, and reactive to light.   HENT:      Head: Normocephalic and atraumatic.   Neck:      Vascular: No carotid bruit or JVD.   Pulmonary:      Effort: Pulmonary effort is normal. No respiratory distress.      Breath sounds: Normal breath sounds. No wheezing. No rales.   Cardiovascular:      Normal rate. Regular rhythm.      Murmurs: There is no murmur.      No gallop. No click. No rub.   Edema:     Peripheral edema absent.   Abdominal:      General: Bowel sounds are normal. There is no distension.      Palpations: Abdomen is soft.   Musculoskeletal: Normal range of motion.         General: No tenderness or deformity.      Cervical back: Neck supple. Skin:     General: Skin is warm and dry.      Findings: No erythema or rash.   Neurological:      Mental Status: Alert and oriented to person, place, and time.   Psychiatric:         Behavior: Behavior normal.         Judgment: Judgment normal.             ECG 12 Lead    Date/Time: 5/27/2021 9:21 AM  Performed by: Anali Parra DNP, APRN  Authorized by: Anali Parra DNP, TERA   Comparison: compared with previous ECG from 11/24/2020  Rhythm: sinus rhythm  BPM: 55  Comments: No significant changes from previous EKG              Assessment:       Diagnosis Plan   1. PAF (paroxysmal atrial fibrillation) (CMS/HCC)     2. Essential hypertension     3. Chronic anticoagulation           Plan:     1. Paroxysmal atrial fibrillation: Maintaining sinus rhythm in " the office today.  Denies palpitations.  On beta-blocker and anticoagulated with Eliquis.  2. Chronic anticoagulation: With Eliquis.  Denies falls.  Recent hemoglobin without evidence of anemia.  Continues to get some occasional hemorrhoidal bleeding which is mild and has had recent colonoscopy showing some internal hemorrhoids.  3. Hypertension: Little high in the office today.  Thinks it may be lower at home but has not been checking regularly recently.  Asked him to check blood pressure daily for the next week at least 1 to 2 hours after morning medications and after sitting calmly 5 to 10 minutes and call if staying greater than 130/80 on average.  If well controlled then continue to check at least once a week between visits.  Discussed importance of blood pressure could control with history of LVH.    4. Chronic shortness of breath with exertion: Mild and unchanged.  Recent stress test 12/2020 considered low risk.  At this point I suspect that this is from some deconditioning.  Discussed importance of easing into a light walking or exercise routine and slowly increasing as tolerated.  Notify office if symptoms worsen or fail to improve at which point additional testing may be indicated.  5. Hyperlipidemia: Managed by outside provider.  On statin therapy.  6. Obstructive sleep apnea: Mild on 12/2020 monitor study.  Dental device as recommended by sleep medicine provider but if this did not adequately treat sleep apnea then auto CPAP would be indicated, per 1/2021 office note.  Unfortunately he reports he has not been using this dental appliance.  We discussed in detail the risks of untreated sleep apnea.  Recommended trying the dental appliance and continuing to follow with sleep medicine, especially if he is not able to tolerate this.  7. Gastritis with history of rectal bleeding: Follows with Dr. Haddad.  Might get rare blood on toilet paper with history of internal hemorrhoids.  Recent hemoglobin normal.   No evidence of any GI bleeding.  8. Glaucoma  9. History of marijuana use: Recommended avoiding stimulants    Patient to follow-up with Dr. Fischer in 6 months or sooner if needed for any new, recurrent, or worsening symptoms or other issues or concerns.  Discussed in detail signs/symptoms that warrant sooner call or follow-up to the office.      Records reviewed including but not limited to 12/2020 echo, 12/2020 stress test, 3/2021 colonoscopy, 3/2021 CBC and CMP, 1/2021 sleep medicine note..           Your medication list          Accurate as of May 27, 2021  9:23 AM. If you have any questions, ask your nurse or doctor.            CHANGE how you take these medications      Instructions Last Dose Given Next Dose Due   omeprazole 40 MG capsule  Commonly known as: priLOSEC  What changed: when to take this      Take 1 capsule by mouth Daily.          CONTINUE taking these medications      Instructions Last Dose Given Next Dose Due   apixaban 5 MG tablet tablet  Commonly known as: ELIQUIS      Take 1 tablet by mouth Every 12 (Twelve) Hours.       finasteride 5 MG tablet  Commonly known as: PROSCAR           lisinopril 20 MG tablet  Commonly known as: PRINIVIL,ZESTRIL      TAKE ONE TABLET BY MOUTH DAILY       metoprolol tartrate 25 MG tablet  Commonly known as: LOPRESSOR      TAKE ONE TABLET BY MOUTH TWICE A [id number]       multivitamin with minerals tablet tablet      Take  by mouth Daily.       OSTEO BI-FLEX ADV DOUBLE ST PO      Take  by mouth Daily.       Rocklatan 0.02-0.005 % solution  Generic drug: Netarsudil-Latanoprost           rosuvastatin 20 MG tablet  Commonly known as: CRESTOR      TAKE ONE TABLET BY MOUTH DAILY       tamsulosin 0.4 MG capsule 24 hr capsule  Commonly known as: FLOMAX      1 capsule Daily.       timolol 0.5 % ophthalmic solution  Commonly known as: TIMOPTIC      Administer 1 drop to both eyes 2 (Two) Times a Day.       Vascepa 1 g capsule capsule  Generic drug: icosapent ethyl      TAKE TWO  CAPSULES BY MOUTH TWICE A DAY WITH MEALS              The above medication changes may not have been made by this provider.  Patient's medication list was updated to reflect medications they are currently taking including medication changes made by other providers.            Thanks,    Anali Parra, WAYNE, APRN  05/27/2021         Dictated utilizing Dragon dictation

## 2021-06-13 NOTE — TELEPHONE ENCOUNTER
Called and spoke with patient.  He verbally understands to hold Eliquis.    
Good afternoon,    Dr Haddad has placed an order for this patient to have a colonoscopy and would like for him to hold his Eliquis for 48 house prior to procedure.  Please let us know if this is ok.    Thank you   
Informed ramirez at  dr. quezada informed 48 hours prior to colonoscopy off Eliquis is okay  
Okay to hold it for 48 hours  
304.8

## 2021-08-30 RX ORDER — ROSUVASTATIN CALCIUM 20 MG/1
TABLET, COATED ORAL
Qty: 90 TABLET | Refills: 1 | Status: SHIPPED | OUTPATIENT
Start: 2021-08-30 | End: 2022-02-25

## 2021-09-10 ENCOUNTER — OFFICE VISIT (OUTPATIENT)
Dept: FAMILY MEDICINE CLINIC | Facility: CLINIC | Age: 61
End: 2021-09-10

## 2021-09-10 VITALS
SYSTOLIC BLOOD PRESSURE: 132 MMHG | HEIGHT: 72 IN | WEIGHT: 200.6 LBS | HEART RATE: 62 BPM | DIASTOLIC BLOOD PRESSURE: 68 MMHG | BODY MASS INDEX: 27.17 KG/M2 | OXYGEN SATURATION: 99 % | TEMPERATURE: 96.9 F

## 2021-09-10 DIAGNOSIS — K21.00 GASTROESOPHAGEAL REFLUX DISEASE WITH ESOPHAGITIS WITHOUT HEMORRHAGE: ICD-10-CM

## 2021-09-10 DIAGNOSIS — I10 ESSENTIAL HYPERTENSION: Primary | ICD-10-CM

## 2021-09-10 DIAGNOSIS — E78.2 MIXED HYPERLIPIDEMIA: ICD-10-CM

## 2021-09-10 LAB
25(OH)D3 SERPL-MCNC: 43 NG/ML (ref 30–100)
ALBUMIN SERPL-MCNC: 4.5 G/DL (ref 3.5–5.2)
ALBUMIN/GLOB SERPL: 1.7 G/DL
ALP SERPL-CCNC: 79 U/L (ref 39–117)
ALT SERPL W P-5'-P-CCNC: 33 U/L (ref 1–41)
ANION GAP SERPL CALCULATED.3IONS-SCNC: 9.3 MMOL/L (ref 5–15)
AST SERPL-CCNC: 23 U/L (ref 1–40)
BILIRUB SERPL-MCNC: 0.3 MG/DL (ref 0–1.2)
BUN SERPL-MCNC: 18 MG/DL (ref 8–23)
BUN/CREAT SERPL: 20.7 (ref 7–25)
CALCIUM SPEC-SCNC: 9.8 MG/DL (ref 8.6–10.5)
CHLORIDE SERPL-SCNC: 105 MMOL/L (ref 98–107)
CHOLEST SERPL-MCNC: 145 MG/DL (ref 0–200)
CO2 SERPL-SCNC: 23.7 MMOL/L (ref 22–29)
CREAT SERPL-MCNC: 0.87 MG/DL (ref 0.76–1.27)
DEPRECATED RDW RBC AUTO: 46.7 FL (ref 37–54)
ERYTHROCYTE [DISTWIDTH] IN BLOOD BY AUTOMATED COUNT: 13.3 % (ref 12.3–15.4)
GFR SERPL CREATININE-BSD FRML MDRD: 89 ML/MIN/1.73
GLOBULIN UR ELPH-MCNC: 2.6 GM/DL
GLUCOSE SERPL-MCNC: 112 MG/DL (ref 65–99)
HCT VFR BLD AUTO: 42.5 % (ref 37.5–51)
HDLC SERPL-MCNC: 30 MG/DL (ref 40–60)
HGB BLD-MCNC: 14.2 G/DL (ref 13–17.7)
LDLC SERPL CALC-MCNC: 69 MG/DL (ref 0–100)
LDLC/HDLC SERPL: 1.92 {RATIO}
MCH RBC QN AUTO: 31.9 PG (ref 26.6–33)
MCHC RBC AUTO-ENTMCNC: 33.4 G/DL (ref 31.5–35.7)
MCV RBC AUTO: 95.5 FL (ref 79–97)
PLATELET # BLD AUTO: 209 10*3/MM3 (ref 140–450)
PMV BLD AUTO: 10.8 FL (ref 6–12)
POTASSIUM SERPL-SCNC: 4.5 MMOL/L (ref 3.5–5.2)
PROT SERPL-MCNC: 7.1 G/DL (ref 6–8.5)
RBC # BLD AUTO: 4.45 10*6/MM3 (ref 4.14–5.8)
SODIUM SERPL-SCNC: 138 MMOL/L (ref 136–145)
TRIGL SERPL-MCNC: 287 MG/DL (ref 0–150)
VLDLC SERPL-MCNC: 46 MG/DL (ref 5–40)
WBC # BLD AUTO: 7.81 10*3/MM3 (ref 3.4–10.8)

## 2021-09-10 PROCEDURE — 80053 COMPREHEN METABOLIC PANEL: CPT | Performed by: INTERNAL MEDICINE

## 2021-09-10 PROCEDURE — 80061 LIPID PANEL: CPT | Performed by: INTERNAL MEDICINE

## 2021-09-10 PROCEDURE — 82306 VITAMIN D 25 HYDROXY: CPT | Performed by: INTERNAL MEDICINE

## 2021-09-10 PROCEDURE — 36415 COLL VENOUS BLD VENIPUNCTURE: CPT | Performed by: INTERNAL MEDICINE

## 2021-09-10 PROCEDURE — 99214 OFFICE O/P EST MOD 30 MIN: CPT | Performed by: INTERNAL MEDICINE

## 2021-09-10 PROCEDURE — 85027 COMPLETE CBC AUTOMATED: CPT | Performed by: INTERNAL MEDICINE

## 2021-09-10 RX ORDER — SODIUM PHOSPHATE,MONO-DIBASIC 19G-7G/118
ENEMA (ML) RECTAL DAILY
COMMUNITY

## 2021-09-10 NOTE — PROGRESS NOTES
Subjective   Elia Walsh is a 61 y.o. male.     Vitals:    09/10/21 0839   BP: 132/68   Pulse: 62   Temp: 96.9 °F (36.1 °C)   SpO2: 99%      Body mass index is 27.21 kg/m².     History of Present Illness   Patient was seen hypertension.  Blood pressures been running 130s over 80s.  Patient will continue lisinopril 20 mg daily, metoprolol tartrate 25 mg twice daily.  Patient will continue monitoring blood pressure at home.  Patient has gastroesophageal reflux that is treated with omeprazole.  This has relieved his symptoms and take it as needed.  Patient's lipids are treated with diet exercise and Vascepa.  Triglycerides 287, HDL 30, LDL 69.  Patient will be advised to continue his Vascepa and stricter adherence to a low carb diet.  Patient does exercise and plays golf on a regular basis.  Patient will continue monitoring blood pressure at home and continue present treatment.    Dictated utilizing Dragon dictation. If there are questions or for further clarification, please contact me.  The following portions of the patient's history were reviewed and updated as appropriate: allergies, current medications, past family history, past medical history, past social history, past surgical history and problem list.    Review of Systems   Constitutional: Negative for fatigue and fever.   HENT: Positive for congestion. Negative for trouble swallowing.    Eyes: Negative for discharge and visual disturbance.   Respiratory: Negative for choking and shortness of breath.    Cardiovascular: Negative for chest pain and palpitations.   Gastrointestinal: Negative for abdominal pain and blood in stool.   Endocrine: Negative.    Genitourinary: Negative for genital sores and hematuria.   Musculoskeletal: Negative for gait problem and joint swelling.   Skin: Negative for color change, pallor, rash and wound.   Allergic/Immunologic: Positive for environmental allergies. Negative for immunocompromised state.   Neurological: Negative for  facial asymmetry and speech difficulty.   Psychiatric/Behavioral: Negative for hallucinations and suicidal ideas.       Objective   Physical Exam  Vitals and nursing note reviewed.   Constitutional:       Appearance: Normal appearance. He is well-developed.   HENT:      Head: Normocephalic and atraumatic.      Nose: Nose normal.      Mouth/Throat:      Mouth: Mucous membranes are moist.      Pharynx: Oropharynx is clear.   Eyes:      Extraocular Movements: Extraocular movements intact.      Conjunctiva/sclera: Conjunctivae normal.      Pupils: Pupils are equal, round, and reactive to light.   Cardiovascular:      Rate and Rhythm: Normal rate and regular rhythm.      Heart sounds: Normal heart sounds. No murmur heard.   No friction rub. No gallop.    Pulmonary:      Effort: Pulmonary effort is normal. No respiratory distress.      Breath sounds: Normal breath sounds. No stridor. No wheezing, rhonchi or rales.   Chest:      Chest wall: No tenderness.   Abdominal:      General: Bowel sounds are normal.      Palpations: Abdomen is soft.   Musculoskeletal:         General: Normal range of motion.      Cervical back: Normal range of motion and neck supple.   Skin:     General: Skin is warm and dry.   Neurological:      General: No focal deficit present.      Mental Status: He is alert and oriented to person, place, and time. Mental status is at baseline.   Psychiatric:         Mood and Affect: Mood normal.         Behavior: Behavior normal.         Thought Content: Thought content normal.         Judgment: Judgment normal.         Assessment/Plan #1 continue monitoring blood pressure #2 continue omeprazole No. 3 stricter adherence to low-carb diet  Problems Addressed this Visit     Cardiac and Vasculature            Essential hypertension - Primary    Relevant Orders    CBC (No Diff) (Completed)    Lipid Panel (Completed)    Comprehensive Metabolic Panel (Completed)    Vitamin D 25 Hydroxy (Completed)    Mixed  hyperlipidemia    Relevant Orders    CBC (No Diff) (Completed)    Lipid Panel (Completed)    Comprehensive Metabolic Panel (Completed)    Vitamin D 25 Hydroxy (Completed)          Gastrointestinal Abdominal             Gastroesophageal reflux disease with esophagitis    Relevant Orders    CBC (No Diff) (Completed)    Lipid Panel (Completed)    Comprehensive Metabolic Panel (Completed)    Vitamin D 25 Hydroxy (Completed)            Diagnoses     Diagnosis Codes Comments    Essential hypertension    -  Primary ICD-10-CM: I10  ICD-9-CM: 401.9     Gastroesophageal reflux disease with esophagitis without hemorrhage     ICD-10-CM: K21.00  ICD-9-CM: 530.81, 530.10     Mixed hyperlipidemia     ICD-10-CM: E78.2  ICD-9-CM: 272.2

## 2021-11-02 ENCOUNTER — CLINICAL SUPPORT (OUTPATIENT)
Dept: FAMILY MEDICINE CLINIC | Facility: CLINIC | Age: 61
End: 2021-11-02

## 2021-11-02 PROCEDURE — 90471 IMMUNIZATION ADMIN: CPT | Performed by: INTERNAL MEDICINE

## 2021-11-02 PROCEDURE — 90686 IIV4 VACC NO PRSV 0.5 ML IM: CPT | Performed by: INTERNAL MEDICINE

## 2021-11-02 RX ORDER — OMEPRAZOLE 40 MG/1
40 CAPSULE, DELAYED RELEASE ORAL EVERY OTHER DAY
Qty: 90 CAPSULE | Refills: 1 | Status: SHIPPED | OUTPATIENT
Start: 2021-11-02 | End: 2022-10-24

## 2021-11-15 RX ORDER — APIXABAN 5 MG/1
TABLET, FILM COATED ORAL
Qty: 60 TABLET | Refills: 3 | Status: SHIPPED | OUTPATIENT
Start: 2021-11-15 | End: 2022-03-10 | Stop reason: SDUPTHER

## 2021-11-18 RX ORDER — ICOSAPENT ETHYL 1000 MG/1
CAPSULE ORAL
Qty: 120 CAPSULE | Refills: 5 | Status: SHIPPED | OUTPATIENT
Start: 2021-11-18 | End: 2022-03-10 | Stop reason: ALTCHOICE

## 2021-11-23 ENCOUNTER — IMMUNIZATION (OUTPATIENT)
Dept: VACCINE CLINIC | Facility: HOSPITAL | Age: 61
End: 2021-11-23

## 2021-11-23 PROCEDURE — 0004A ADM SARSCOV2 30MCG/0.3ML BOOSTER: CPT | Performed by: INTERNAL MEDICINE

## 2021-11-23 PROCEDURE — 91300 HC SARSCOV02 VAC 30MCG/0.3ML IM: CPT | Performed by: INTERNAL MEDICINE

## 2022-01-04 RX ORDER — LISINOPRIL 20 MG/1
TABLET ORAL
Qty: 90 TABLET | Refills: 1 | Status: SHIPPED | OUTPATIENT
Start: 2022-01-04 | End: 2022-07-05

## 2022-01-14 ENCOUNTER — OFFICE VISIT (OUTPATIENT)
Dept: CARDIOLOGY | Facility: CLINIC | Age: 62
End: 2022-01-14

## 2022-01-14 VITALS
HEART RATE: 54 BPM | WEIGHT: 204 LBS | DIASTOLIC BLOOD PRESSURE: 74 MMHG | BODY MASS INDEX: 27.63 KG/M2 | SYSTOLIC BLOOD PRESSURE: 132 MMHG | HEIGHT: 72 IN

## 2022-01-14 DIAGNOSIS — I10 ESSENTIAL HYPERTENSION: ICD-10-CM

## 2022-01-14 DIAGNOSIS — E78.2 HYPERLIPEMIA, MIXED: ICD-10-CM

## 2022-01-14 DIAGNOSIS — I48.0 PAF (PAROXYSMAL ATRIAL FIBRILLATION): Primary | ICD-10-CM

## 2022-01-14 PROCEDURE — 93000 ELECTROCARDIOGRAM COMPLETE: CPT | Performed by: NURSE PRACTITIONER

## 2022-01-14 PROCEDURE — 99214 OFFICE O/P EST MOD 30 MIN: CPT | Performed by: NURSE PRACTITIONER

## 2022-01-14 NOTE — PROGRESS NOTES
[facility]   [address], [address]  [place], [place], [address]    [phone]  [email or web address]           OUTPATIENT CLINIC FOLLOW UP NOTE    Patient Care Team:  Patient Care Team:  Ghulam Colmenares MD as PCP - Kat Beach MD as Consulting Physician (Cardiology)  Al Haddad MD as Consulting Physician (Gastroenterology)    Subjective:      Chief Complaint   Patient presents with   • Atrial Fibrillation   • Hypertension       HPI:    Elia Walsh is a 61 y.o. male.  Problem list:  1. Paroxysmal atrial fibrillation  a. Initially diagnosed in 2020 by PCP.  2-week Holter at that time revealed paroxysmal atrial fibrillation with longest episode approximately 7 hours.  b. TTE 12/2020: EF 61 to 65%, mild concentric LVH, no significant valvular stenosis or regurgitation.  c. Stress test 12/2020: Without evidence of ischemia  2. Hypertension  3. Hyperlipidemia  4. GERD  5. Rectal bleeding/Internal hemorrhoids  a. Colonoscopy 3/2021 with internal hemorrhoids  6. Obstructive sleep apnea  a. Noted to be mild per home sleep study 12/2020.  Was recommended mouthguard by sleep medicine which she does not use at this time.    The patient presents today for follow-up.  His primary cardiologist is Dr. Fischer.  He was last seen by TERA Wilcox in 5/2021.    Since the patient was last seen he reports he has been doing well from a cardiac standpoint.  He continues to have mild dyspnea with exertion that is stable.  He denies chest pain, palpitations, lower extremity edema, lightheadedness, or syncope.  Has not been using a mouthguard as recommended by sleep medicine.  BP has been better controlled at home.    Review of Systems:  Today for dyspnea with exertion  Negative for exertional chest pain, lower extremity edema, palpitations, syncope.     PFSH:  Patient Active Problem List   Diagnosis   • Essential hypertension   • Mixed hyperlipidemia   • Gastroesophageal reflux  "disease with esophagitis   • Tubular adenoma of colon   • Perianal venous thrombosis   • Benign prostatic hyperplasia with lower urinary tract symptoms   • PAF (paroxysmal atrial fibrillation) (HCC)   • Snoring   • Daytime somnolence   • Weakness   • Diaphoresis   • Palpitations   • POLY (obstructive sleep apnea)   • Hypersomnia due to medical condition   • Glaucoma   • Chronic anticoagulation         Current Outpatient Medications:   •  Eliquis 5 MG tablet tablet, TAKE ONE TABLET BY MOUTH EVERY 12 HOURS, Disp: 60 tablet, Rfl: 3  •  finasteride (PROSCAR) 5 MG tablet, Take 5 mg by mouth Daily., Disp: , Rfl:   •  glucosamine-chondroitin 500-400 MG capsule capsule, Take  by mouth 3 (Three) Times a Day With Meals., Disp: , Rfl:   •  lisinopril (PRINIVIL,ZESTRIL) 20 MG tablet, TAKE ONE TABLET BY MOUTH DAILY, Disp: 90 tablet, Rfl: 1  •  metoprolol tartrate (LOPRESSOR) 25 MG tablet, TAKE ONE TABLET BY MOUTH TWICE A DAY, Disp: 60 tablet, Rfl: 3  •  Multiple Vitamins-Minerals (MULTIVITAL PO), Take  by mouth Daily., Disp: , Rfl:   •  omeprazole (priLOSEC) 40 MG capsule, Take 1 capsule by mouth Every Other Day., Disp: 90 capsule, Rfl: 1  •  Rocklatan 0.02-0.005 % solution, , Disp: , Rfl:   •  rosuvastatin (CRESTOR) 20 MG tablet, TAKE ONE TABLET BY MOUTH DAILY, Disp: 90 tablet, Rfl: 1  •  tamsulosin (FLOMAX) 0.4 MG capsule 24 hr capsule, 1 capsule Daily., Disp: , Rfl:   •  timolol (TIMOPTIC) 0.5 % ophthalmic solution, Administer 1 drop to both eyes 2 (Two) Times a Day., Disp: , Rfl:   •  Vascepa 1 g capsule capsule, TAKE TWO CAPSULES BY MOUTH TWICE A DAY WITH MEALS, Disp: 120 capsule, Rfl: 5    No Known Allergies     reports that he quit smoking about 28 years ago. His smoking use included cigarettes. He has never used smokeless tobacco.      Objective:   Physical exam:  /74   Pulse 54   Ht 182.9 cm (72\")   Wt 92.5 kg (204 lb)   BMI 27.67 kg/m²   CONSTITUTIONAL: No acute distress  RESPIRATORY: Normal effort. Clear to " auscultation bilaterally without wheezing or rales  CARDIOVASCULAR: Carotids with normal upstrokes without bruits.  Regular rate and rhythm with normal S1 and S2. Without murmur, gallop or rub. Normal radial pulse. There is no lower extremity edema bilaterally.    Labs:    BUN   Date Value Ref Range Status   09/10/2021 18 8 - 23 mg/dL Final     Creatinine   Date Value Ref Range Status   09/10/2021 0.87 0.76 - 1.27 mg/dL Final     Potassium   Date Value Ref Range Status   09/10/2021 4.5 3.5 - 5.2 mmol/L Final     ALT (SGPT)   Date Value Ref Range Status   09/10/2021 33 1 - 41 U/L Final     AST (SGOT)   Date Value Ref Range Status   09/10/2021 23 1 - 40 U/L Final       Lab Results   Component Value Date    CHOL 145 09/10/2021     Lab Results   Component Value Date    TRIG 287 (H) 09/10/2021     Lab Results   Component Value Date    HDL 30 (L) 09/10/2021     Lab Results   Component Value Date    LDL 69 09/10/2021     No components found for: LDLDIRECTC    Diagnostic Data:      ECG 12 Lead    Date/Time: 1/14/2022 12:00 PM  Performed by: Namita Beltran APRN  Authorized by: Namita Beltran APRN   Comparison: compared with previous ECG from 5/27/2021  Similar to previous ECG  Rhythm: sinus bradycardia  Rate: bradycardic  BPM: 54  Comments: QRS 98 ms,  ms            Results for orders placed during the hospital encounter of 12/21/20    Adult Transthoracic Echo Complete W/ Cont if Necessary Per Protocol    Interpretation Summary  · Left ventricular ejection fraction appears to be 61 - 65%. Left ventricular systolic function is Left ventricular wall thickness is consistent with concentric hypertrophy.  · Left ventricular diastolic function was normal.  · No significant valvular stenosis or regurgitation noted.    Nuclear stress test 12/2020  · Myocardial perfusion imaging indicates a normal myocardial perfusion study with no evidence of ischemia.  · Left ventricular ejection fraction is normal. (Calculated EF =  57%).  · Impressions are consistent with a low risk study.  · There is no prior study available for comparison.    14-day Holter 9/2020  · An abnormal monitor study.  · Episodes of paroxsymal atrial fibrillation are present representing 2% of recorded beats.  · Longest episode of AFIB was 6 hours, 57 minutes on 10/1, starting approximately midnight  · With AFIB, average HR is 91 BPM, range  BPM    Assessment and Plan:   Diagnoses and all orders for this visit:    PAF (paroxysmal atrial fibrillation) (HCC) (Primary)  -Currently maintaining sinus rhythm.  -Continue metoprolol, Eliquis    Essential hypertension  -Stable, continue lisinopril and metoprolol.  -Patient instructed to call if BP averaging greater than 130/80.    Hyperlipemia, mixed  -Triglycerides still significantly elevated 9/2021.  -Continue Crestor, Vascepa  -Encouraged dietary and lifestyle modifications.  -Routine lipid panel per PCP.    - Return in about 6 months (around 7/14/2022) for Next scheduled follow up Dr. Fischer.    Electronically signed by TERA Franks, 01/14/22, 9:21 AM MARTÍNEZ.

## 2022-02-25 RX ORDER — ROSUVASTATIN CALCIUM 20 MG/1
TABLET, COATED ORAL
Qty: 90 TABLET | Refills: 1 | Status: SHIPPED | OUTPATIENT
Start: 2022-02-25 | End: 2022-08-26

## 2022-03-02 DIAGNOSIS — I10 ESSENTIAL HYPERTENSION: ICD-10-CM

## 2022-03-02 DIAGNOSIS — E55.9 VITAMIN D DEFICIENCY: ICD-10-CM

## 2022-03-02 DIAGNOSIS — K21.00 GASTROESOPHAGEAL REFLUX DISEASE WITH ESOPHAGITIS WITHOUT HEMORRHAGE: Primary | ICD-10-CM

## 2022-03-03 ENCOUNTER — LAB (OUTPATIENT)
Dept: FAMILY MEDICINE CLINIC | Facility: CLINIC | Age: 62
End: 2022-03-03

## 2022-03-03 DIAGNOSIS — E55.9 VITAMIN D DEFICIENCY: ICD-10-CM

## 2022-03-03 DIAGNOSIS — K21.00 GASTROESOPHAGEAL REFLUX DISEASE WITH ESOPHAGITIS WITHOUT HEMORRHAGE: ICD-10-CM

## 2022-03-03 DIAGNOSIS — I10 ESSENTIAL HYPERTENSION: ICD-10-CM

## 2022-03-03 LAB
25(OH)D3 SERPL-MCNC: 30.4 NG/ML (ref 30–100)
ALBUMIN SERPL-MCNC: 4.6 G/DL (ref 3.5–5.2)
ALBUMIN/GLOB SERPL: 2 G/DL
ALP SERPL-CCNC: 92 U/L (ref 39–117)
ALT SERPL W P-5'-P-CCNC: 33 U/L (ref 1–41)
ANION GAP SERPL CALCULATED.3IONS-SCNC: 9 MMOL/L (ref 5–15)
AST SERPL-CCNC: 25 U/L (ref 1–40)
BILIRUB SERPL-MCNC: 0.4 MG/DL (ref 0–1.2)
BUN SERPL-MCNC: 17 MG/DL (ref 8–23)
BUN/CREAT SERPL: 19.3 (ref 7–25)
CALCIUM SPEC-SCNC: 9.6 MG/DL (ref 8.6–10.5)
CHLORIDE SERPL-SCNC: 102 MMOL/L (ref 98–107)
CHOLEST SERPL-MCNC: 169 MG/DL (ref 0–200)
CO2 SERPL-SCNC: 26 MMOL/L (ref 22–29)
CREAT SERPL-MCNC: 0.88 MG/DL (ref 0.76–1.27)
DEPRECATED RDW RBC AUTO: 47.8 FL (ref 37–54)
EGFRCR SERPLBLD CKD-EPI 2021: 96.6 ML/MIN/1.73
ERYTHROCYTE [DISTWIDTH] IN BLOOD BY AUTOMATED COUNT: 13.6 % (ref 12.3–15.4)
GLOBULIN UR ELPH-MCNC: 2.3 GM/DL
GLUCOSE SERPL-MCNC: 114 MG/DL (ref 65–99)
HCT VFR BLD AUTO: 45.9 % (ref 37.5–51)
HDLC SERPL-MCNC: 36 MG/DL (ref 40–60)
HGB BLD-MCNC: 15.5 G/DL (ref 13–17.7)
LDLC SERPL CALC-MCNC: 75 MG/DL (ref 0–100)
LDLC/HDLC SERPL: 1.68 {RATIO}
MCH RBC QN AUTO: 32.4 PG (ref 26.6–33)
MCHC RBC AUTO-ENTMCNC: 33.8 G/DL (ref 31.5–35.7)
MCV RBC AUTO: 95.8 FL (ref 79–97)
PLATELET # BLD AUTO: 227 10*3/MM3 (ref 140–450)
PMV BLD AUTO: 11.1 FL (ref 6–12)
POTASSIUM SERPL-SCNC: 4.9 MMOL/L (ref 3.5–5.2)
PROT SERPL-MCNC: 6.9 G/DL (ref 6–8.5)
RBC # BLD AUTO: 4.79 10*6/MM3 (ref 4.14–5.8)
SODIUM SERPL-SCNC: 137 MMOL/L (ref 136–145)
TRIGL SERPL-MCNC: 363 MG/DL (ref 0–150)
VLDLC SERPL-MCNC: 58 MG/DL (ref 5–40)
WBC NRBC COR # BLD: 8.9 10*3/MM3 (ref 3.4–10.8)

## 2022-03-03 PROCEDURE — 36415 COLL VENOUS BLD VENIPUNCTURE: CPT | Performed by: INTERNAL MEDICINE

## 2022-03-03 PROCEDURE — 82306 VITAMIN D 25 HYDROXY: CPT | Performed by: INTERNAL MEDICINE

## 2022-03-03 PROCEDURE — 80061 LIPID PANEL: CPT | Performed by: INTERNAL MEDICINE

## 2022-03-03 PROCEDURE — 80053 COMPREHEN METABOLIC PANEL: CPT | Performed by: INTERNAL MEDICINE

## 2022-03-03 PROCEDURE — 85027 COMPLETE CBC AUTOMATED: CPT | Performed by: INTERNAL MEDICINE

## 2022-03-10 ENCOUNTER — OFFICE VISIT (OUTPATIENT)
Dept: FAMILY MEDICINE CLINIC | Facility: CLINIC | Age: 62
End: 2022-03-10

## 2022-03-10 VITALS
HEIGHT: 72 IN | HEART RATE: 58 BPM | TEMPERATURE: 97.3 F | WEIGHT: 207.2 LBS | OXYGEN SATURATION: 98 % | DIASTOLIC BLOOD PRESSURE: 88 MMHG | BODY MASS INDEX: 28.06 KG/M2 | SYSTOLIC BLOOD PRESSURE: 158 MMHG

## 2022-03-10 DIAGNOSIS — Z00.00 PHYSICAL EXAM, ANNUAL: Primary | ICD-10-CM

## 2022-03-10 PROCEDURE — 99396 PREV VISIT EST AGE 40-64: CPT | Performed by: INTERNAL MEDICINE

## 2022-03-10 RX ORDER — ICOSAPENT ETHYL 1000 MG/1
2 CAPSULE ORAL 2 TIMES DAILY WITH MEALS
Qty: 120 CAPSULE | Refills: 5 | Status: SHIPPED | OUTPATIENT
Start: 2022-03-10 | End: 2022-07-27

## 2022-03-10 NOTE — PROGRESS NOTES
Subjective   Elia Walsh is a 61 y.o. male.     Vitals:    03/10/22 0837   BP: 158/88   Pulse: 58   Temp: 97.3 °F (36.3 °C)   SpO2: 98%      Body mass index is 28.1 kg/m².     History of Present Illness   Patient was seen for a physical.  Patient diet physical activity were discussed at this visit.    Dictated utilizing Dragon dictation. If there are questions or for further clarification, please contact me.  The following portions of the patient's history were reviewed and updated as appropriate: allergies, current medications, past family history, past medical history, past social history, past surgical history and problem list.    Review of Systems   Constitutional: Negative for fatigue and fever.   HENT: Positive for congestion. Negative for trouble swallowing.    Eyes: Negative for discharge and visual disturbance.   Respiratory: Negative for choking and shortness of breath.    Cardiovascular: Negative for chest pain and palpitations.   Gastrointestinal: Negative for abdominal pain and blood in stool.   Endocrine: Negative.    Genitourinary: Negative for genital sores and hematuria.   Musculoskeletal: Negative for gait problem and joint swelling.   Skin: Negative for color change, pallor, rash and wound.   Allergic/Immunologic: Positive for environmental allergies. Negative for immunocompromised state.   Neurological: Negative for facial asymmetry and speech difficulty.   Psychiatric/Behavioral: Negative for hallucinations and suicidal ideas.       Objective   Physical Exam  Vitals and nursing note reviewed.   Constitutional:       Appearance: Normal appearance. He is well-developed.   HENT:      Head: Normocephalic and atraumatic.      Nose: Nose normal.      Mouth/Throat:      Mouth: Mucous membranes are moist.      Pharynx: Oropharynx is clear.   Eyes:      Extraocular Movements: Extraocular movements intact.      Conjunctiva/sclera: Conjunctivae normal.      Pupils: Pupils are equal, round, and reactive  to light.   Cardiovascular:      Rate and Rhythm: Normal rate and regular rhythm.      Heart sounds: Normal heart sounds. No murmur heard.    No friction rub. No gallop.   Pulmonary:      Effort: Pulmonary effort is normal. No respiratory distress.      Breath sounds: Normal breath sounds. No stridor. No wheezing, rhonchi or rales.   Chest:      Chest wall: No tenderness.   Abdominal:      General: Bowel sounds are normal.      Palpations: Abdomen is soft.   Musculoskeletal:         General: Normal range of motion.      Cervical back: Normal range of motion and neck supple.   Skin:     General: Skin is warm and dry.   Neurological:      General: No focal deficit present.      Mental Status: He is alert and oriented to person, place, and time. Mental status is at baseline.   Psychiatric:         Mood and Affect: Mood normal.         Behavior: Behavior normal.         Thought Content: Thought content normal.         Judgment: Judgment normal.         Assessment/Plan #1 physical 2 labs  Problems Addressed this Visit    None     Visit Diagnoses     Physical exam, annual    -  Primary      Diagnoses     Diagnosis Codes Comments    Physical exam, annual    -  Primary ICD-10-CM: Z00.00  ICD-9-CM: V70.0

## 2022-07-05 RX ORDER — LISINOPRIL 20 MG/1
TABLET ORAL
Qty: 90 TABLET | Refills: 1 | Status: SHIPPED | OUTPATIENT
Start: 2022-07-05 | End: 2023-01-03

## 2022-07-11 RX ORDER — APIXABAN 5 MG/1
TABLET, FILM COATED ORAL
Qty: 60 TABLET | Refills: 3 | Status: SHIPPED | OUTPATIENT
Start: 2022-07-11 | End: 2022-11-14

## 2022-07-27 RX ORDER — ICOSAPENT ETHYL 1000 MG/1
CAPSULE ORAL
Qty: 120 CAPSULE | Refills: 5 | Status: SHIPPED | OUTPATIENT
Start: 2022-07-27 | End: 2023-02-06

## 2022-08-26 RX ORDER — ROSUVASTATIN CALCIUM 20 MG/1
TABLET, COATED ORAL
Qty: 90 TABLET | Refills: 1 | Status: SHIPPED | OUTPATIENT
Start: 2022-08-26 | End: 2023-02-22

## 2022-09-07 ENCOUNTER — LAB (OUTPATIENT)
Dept: FAMILY MEDICINE CLINIC | Facility: CLINIC | Age: 62
End: 2022-09-07

## 2022-09-07 DIAGNOSIS — E78.2 MIXED HYPERLIPIDEMIA: ICD-10-CM

## 2022-09-07 DIAGNOSIS — E55.9 VITAMIN D DEFICIENCY: ICD-10-CM

## 2022-09-07 DIAGNOSIS — I10 ESSENTIAL HYPERTENSION: Primary | ICD-10-CM

## 2022-09-07 DIAGNOSIS — I10 ESSENTIAL HYPERTENSION: ICD-10-CM

## 2022-09-07 LAB
25(OH)D3 SERPL-MCNC: 49.6 NG/ML (ref 30–100)
ALBUMIN SERPL-MCNC: 4.6 G/DL (ref 3.5–5.2)
ALBUMIN/GLOB SERPL: 2 G/DL
ALP SERPL-CCNC: 70 U/L (ref 39–117)
ALT SERPL W P-5'-P-CCNC: 24 U/L (ref 1–41)
ANION GAP SERPL CALCULATED.3IONS-SCNC: 10 MMOL/L (ref 5–15)
AST SERPL-CCNC: 21 U/L (ref 1–40)
BILIRUB SERPL-MCNC: 0.7 MG/DL (ref 0–1.2)
BUN SERPL-MCNC: 15 MG/DL (ref 8–23)
BUN/CREAT SERPL: 17.4 (ref 7–25)
CALCIUM SPEC-SCNC: 9.6 MG/DL (ref 8.6–10.5)
CHLORIDE SERPL-SCNC: 101 MMOL/L (ref 98–107)
CHOLEST SERPL-MCNC: 140 MG/DL (ref 0–200)
CO2 SERPL-SCNC: 26 MMOL/L (ref 22–29)
CREAT SERPL-MCNC: 0.86 MG/DL (ref 0.76–1.27)
DEPRECATED RDW RBC AUTO: 48 FL (ref 37–54)
EGFRCR SERPLBLD CKD-EPI 2021: 96.7 ML/MIN/1.73
ERYTHROCYTE [DISTWIDTH] IN BLOOD BY AUTOMATED COUNT: 13.6 % (ref 12.3–15.4)
GLOBULIN UR ELPH-MCNC: 2.3 GM/DL
GLUCOSE SERPL-MCNC: 128 MG/DL (ref 65–99)
HCT VFR BLD AUTO: 44.6 % (ref 37.5–51)
HDLC SERPL-MCNC: 32 MG/DL (ref 40–60)
HGB BLD-MCNC: 15.1 G/DL (ref 13–17.7)
LDLC SERPL CALC-MCNC: 59 MG/DL (ref 0–100)
LDLC/HDLC SERPL: 1.4 {RATIO}
MCH RBC QN AUTO: 32.6 PG (ref 26.6–33)
MCHC RBC AUTO-ENTMCNC: 33.9 G/DL (ref 31.5–35.7)
MCV RBC AUTO: 96.3 FL (ref 79–97)
PLATELET # BLD AUTO: 228 10*3/MM3 (ref 140–450)
PMV BLD AUTO: 11.1 FL (ref 6–12)
POTASSIUM SERPL-SCNC: 4.3 MMOL/L (ref 3.5–5.2)
PROT SERPL-MCNC: 6.9 G/DL (ref 6–8.5)
RBC # BLD AUTO: 4.63 10*6/MM3 (ref 4.14–5.8)
SODIUM SERPL-SCNC: 137 MMOL/L (ref 136–145)
TRIGL SERPL-MCNC: 316 MG/DL (ref 0–150)
VLDLC SERPL-MCNC: 49 MG/DL (ref 5–40)
WBC NRBC COR # BLD: 9.54 10*3/MM3 (ref 3.4–10.8)

## 2022-09-07 PROCEDURE — 80053 COMPREHEN METABOLIC PANEL: CPT | Performed by: INTERNAL MEDICINE

## 2022-09-07 PROCEDURE — 85027 COMPLETE CBC AUTOMATED: CPT | Performed by: INTERNAL MEDICINE

## 2022-09-07 PROCEDURE — 82306 VITAMIN D 25 HYDROXY: CPT | Performed by: INTERNAL MEDICINE

## 2022-09-07 PROCEDURE — 36415 COLL VENOUS BLD VENIPUNCTURE: CPT | Performed by: INTERNAL MEDICINE

## 2022-09-07 PROCEDURE — 80061 LIPID PANEL: CPT | Performed by: INTERNAL MEDICINE

## 2022-09-14 ENCOUNTER — OFFICE VISIT (OUTPATIENT)
Dept: FAMILY MEDICINE CLINIC | Facility: CLINIC | Age: 62
End: 2022-09-14

## 2022-09-14 VITALS
HEIGHT: 72 IN | SYSTOLIC BLOOD PRESSURE: 130 MMHG | TEMPERATURE: 98 F | OXYGEN SATURATION: 99 % | DIASTOLIC BLOOD PRESSURE: 80 MMHG | HEART RATE: 56 BPM | WEIGHT: 200.8 LBS | BODY MASS INDEX: 27.2 KG/M2

## 2022-09-14 DIAGNOSIS — E78.2 ELEVATED TRIGLYCERIDES WITH HIGH CHOLESTEROL: ICD-10-CM

## 2022-09-14 DIAGNOSIS — I10 ESSENTIAL HYPERTENSION: ICD-10-CM

## 2022-09-14 DIAGNOSIS — R73.9 ELEVATED BLOOD SUGAR: Primary | ICD-10-CM

## 2022-09-14 LAB
ANION GAP SERPL CALCULATED.3IONS-SCNC: 8.7 MMOL/L (ref 5–15)
BUN SERPL-MCNC: 16 MG/DL (ref 8–23)
BUN/CREAT SERPL: 17.4 (ref 7–25)
CALCIUM SPEC-SCNC: 9.3 MG/DL (ref 8.6–10.5)
CHLORIDE SERPL-SCNC: 102 MMOL/L (ref 98–107)
CO2 SERPL-SCNC: 27.3 MMOL/L (ref 22–29)
CREAT SERPL-MCNC: 0.92 MG/DL (ref 0.76–1.27)
EGFRCR SERPLBLD CKD-EPI 2021: 92.9 ML/MIN/1.73
GLUCOSE SERPL-MCNC: 112 MG/DL (ref 65–99)
HBA1C MFR BLD: 5.6 % (ref 4.8–5.6)
POTASSIUM SERPL-SCNC: 4.5 MMOL/L (ref 3.5–5.2)
SODIUM SERPL-SCNC: 138 MMOL/L (ref 136–145)
TRIGL SERPL-MCNC: 271 MG/DL (ref 0–150)

## 2022-09-14 PROCEDURE — 99214 OFFICE O/P EST MOD 30 MIN: CPT | Performed by: INTERNAL MEDICINE

## 2022-09-14 PROCEDURE — 36415 COLL VENOUS BLD VENIPUNCTURE: CPT | Performed by: INTERNAL MEDICINE

## 2022-09-14 PROCEDURE — 84478 ASSAY OF TRIGLYCERIDES: CPT | Performed by: INTERNAL MEDICINE

## 2022-09-14 PROCEDURE — 83036 HEMOGLOBIN GLYCOSYLATED A1C: CPT | Performed by: INTERNAL MEDICINE

## 2022-09-14 PROCEDURE — 80048 BASIC METABOLIC PNL TOTAL CA: CPT | Performed by: INTERNAL MEDICINE

## 2022-09-14 NOTE — PROGRESS NOTES
"Chief Complaint  Hypertension and Hyperlipidemia    Subjective        Elia Walsh presents to [facility]  History of Present Illness patient was seen for elevated blood sugar.  Blood sugar was 128 and patient is having fasting labs redrawn today.  Triglycerides levels was 315.  It has increased over 1 year from 196-386.  Patient was placed on Vascepa 2 g twice daily and is taking rosuvastatin 20 mg daily.  Glycerides have steadily increased and patient is being referred to endocrinology for evaluation.  Patient is exercising but is not following a strict diet.  Patient was placed on a DASH diet and asked to follow-up with endocrinologist.  Patient's blood pressures been running 130s over 80s.  Patient will continue lisinopril 20 mg daily, metoprolol tartrate 25 mg twice daily.  Patient will continue monitoring at home.    Dictated utilizing Dragon dictation. If there are questions or for further clarification, please contact me.    Objective   Vital Signs:  Blood Pressure 130/80 (BP Location: Left arm, Patient Position: Sitting, Cuff Size: Large Adult)   Pulse 56   Temperature 98 °F (36.7 °C) (Infrared)   Height 182.9 cm (72\")   Weight 91.1 kg (200 lb 12.8 oz)   Oxygen Saturation 99%   Body Mass Index 27.23 kg/m²   Estimated body mass index is 27.23 kg/m² as calculated from the following:    Height as of this encounter: 182.9 cm (72\").    Weight as of this encounter: 91.1 kg (200 lb 12.8 oz).          Physical Exam  Vitals and nursing note reviewed.   Constitutional:       Appearance: Normal appearance. He is well-developed.   HENT:      Head: Normocephalic and atraumatic.      Nose: Nose normal.      Mouth/Throat:      Mouth: Mucous membranes are moist.      Pharynx: Oropharynx is clear.   Eyes:      Extraocular Movements: Extraocular movements intact.      Conjunctiva/sclera: Conjunctivae normal.      Pupils: Pupils are equal, round, and reactive to light.   Cardiovascular: "      Rate and Rhythm: Normal rate and regular rhythm.      Heart sounds: Normal heart sounds. No murmur heard.    No friction rub. No gallop.   Pulmonary:      Effort: Pulmonary effort is normal. No respiratory distress.      Breath sounds: Normal breath sounds. No stridor. No wheezing, rhonchi or rales.   Chest:      Chest wall: No tenderness.   Abdominal:      General: Bowel sounds are normal.      Palpations: Abdomen is soft.   Musculoskeletal:         General: Normal range of motion.      Cervical back: Normal range of motion and neck supple.   Skin:     General: Skin is warm and dry.   Neurological:      General: No focal deficit present.      Mental Status: He is alert and oriented to person, place, and time. Mental status is at baseline.   Psychiatric:         Mood and Affect: Mood normal.         Behavior: Behavior normal.         Thought Content: Thought content normal.         Judgment: Judgment normal.        Result Review :                Assessment and Plan  recheck fasting blood sugar with hemoglobin A1c #2 refer to endocrinology for elevated triglycerides #3 continue monitoring blood pressure at home  Diagnoses and all orders for this visit:    1. Elevated blood sugar (Primary)  -     Basic Metabolic Panel; Future  -     Hemoglobin A1c; Future  -     Basic Metabolic Panel  -     Hemoglobin A1c    2. Elevated triglycerides with high cholesterol  -     Triglycerides; Future  -     Ambulatory Referral to Endocrinology  -     Triglycerides    3. Essential hypertension             Follow Up   Return in about 6 months (around 3/14/2023), or if symptoms worsen or fail to improve, for Recheck.  Patient was given instructions and counseling regarding his condition or for health maintenance advice. Please see specific information pulled into the AVS if appropriate.

## 2022-09-21 PROBLEM — R61 DIAPHORESIS: Status: RESOLVED | Noted: 2020-12-06 | Resolved: 2022-09-21

## 2022-09-21 PROBLEM — R06.83 SNORING: Status: RESOLVED | Noted: 2020-12-06 | Resolved: 2022-09-21

## 2022-09-21 PROBLEM — R53.1 WEAKNESS: Status: RESOLVED | Noted: 2020-12-06 | Resolved: 2022-09-21

## 2022-09-21 PROBLEM — G47.14 HYPERSOMNIA DUE TO MEDICAL CONDITION: Status: RESOLVED | Noted: 2021-01-03 | Resolved: 2022-09-21

## 2022-09-21 PROBLEM — R00.2 PALPITATIONS: Status: RESOLVED | Noted: 2020-12-06 | Resolved: 2022-09-21

## 2022-09-21 PROBLEM — R40.0 DAYTIME SOMNOLENCE: Status: RESOLVED | Noted: 2020-12-06 | Resolved: 2022-09-21

## 2022-09-21 NOTE — PROGRESS NOTES
Elia Walsh, 62 y.o., Gender: male    Assessment/Plan    1.  Pt w/ hyperlipidemia LDL at goal <130 mg/dL, trig at goal <150 mg/dL, HDL at goal >40 mg/dL.  Pt noted to not have thyroid check in nearly 2 yrs and as that is key to the concept of lipids did offer to check that today as one item could address while pt was here.  Pt is on Crestor 20 mg and notes has been on this for some time.  Pt is not on directed tx for trigs, in that Fenofibrate is the directed medication for this.  I expect trigs would be around 500 mg/dL w/o use of Crestor.  Pt also on Rx fish oil, but ultimately that is not sig different then OTC fish oil short of being concentrated and ultimately does little to help with trigs when they are this high but does help with blood vessel integrity.  On talking with the pt found out mxl items.  He was screened for sleep apnea back in '19 and failed the home screening test but as he says he would not wear the mask he never did the formal sleep study.  Pt retired in '18 and has gained 25-30 lbs since that time.  Pt reports is deconditioned relative to fitness when he was working and now gets short of breath with even minimal activity at times.  Pt also changed his diet during that time and is consuming more carbs then he did in the past as well as having more alcohol in the form of beer, which I pointed out to pt is basically liquid carbs.  Pt would like to work on his diet first and work to get in better shape as exercise can improve trigs before adding another medication in form of Fenofibrate.  Pt also reports mood has been down some, though he denies depression, he has had mxl family members pass away in recent years so that could be part of his general deconditioning in that he is not doing as many things as he used to do and finds it hard to get out of bed some days.  I did advise he talk to his PCP about his mood and ultimately that might be contributing factor as well.  From there unless a  thyroid problem is found feel PCP can continue to manage.  2.  Pt counseled in detail potential side effects of treatment to include muscle weakness, muscle aches, liver dysfunction, and kidney dysfunction.  3.  Note visit not started till around 210 as pt arrived last minute and was delay getting pt ready.        Time Counseled: 25  Minutes  Total Time: 40  Minutes    Return to office in: back to PCP pending lab      HPI Summary    Pt is here for evaluation of hyperlipidemia.  Pt reports he retired in '18 and as of '19 has noted more problems arising.  He reports has an abnormal home sleep test raising concern for sleep apnea but he bremeo snot want to do the mask so did not f/u on that.  He has had mxl family members die during that past few yrs and notes has some grief still about that and states some days he is tired and just does not want to get out of bed to do things.  He notes he gained wgt and related to not being as active is aware he is deconditioned and has shortness of breath at times even with minimal activity.  Pt also notes his diet has not been as good as been doing more carbs then ever before in the past.  Pt denies any chest pain.  Pt denies any muscle weakness or aches.  Pt has report of fatigue.  Pt reports vision complaints w/ dx of glaucoma in past couple of yrs as well.  Pt denies any intermittent claudication.  Pt denies any skin lesions.  Pt denies any abdominal c/o consistent w/ pancreatitis currently.  Pt denies any excess urination.  Pt reports some mental slowness.  Pt states tolerating meds. Pt without any other complaints related to hyperlipidemia at this time.    Review of Systems  see HPI      Patient History    Past History (reviewed):    Medical:   Past Medical History:   Diagnosis Date   • Allergic    • Anticoagulated    • BPH (benign prostatic hyperplasia)    • Cataract    • DJD (degenerative joint disease)    • Essential hypertension, benign    • GERD (gastroesophageal reflux  disease)    • Glaucoma    • Grief reaction     mxl family members lost in few yrs time   • Impaired glucose metabolism 2019   • Mixed hyperlipidemia    • POLY (obstructive sleep apnea) 2020    Home sleep study failed but pt does not want cpap. was reported as mild POLY with AHI 7.4 events per hour, possibly underestimated. The patient snored 14% of the total monitoring time.   • PAF (paroxysmal atrial fibrillation) (HCC)    • Rectal bleeding        Surgery:   Past Surgical History:   Procedure Laterality Date   • COLONOSCOPY      2013   • COLONOSCOPY  04/16/2013    Jeffrey Ascencio MD   • COLONOSCOPY  09/29/2006    Jeffrey Ascencio MD   • ENDOSCOPY  05/28/2015    With biopsies, Al Haddad MD   • ENDOSCOPY  09/29/2006    With biopsies, Jeffrey Ascencio MD   • HEMORRHOIDECTOMY     • HERNIA REPAIR      X2         Social History (reviewed):  Smoking 0.5-1 ppd 25 yrs quit late '80s, reg ETOH, + Drugs reg marijuana,  one of his step daughters is currently living with him, Occupation retired [employer]       Medication List:  Current medications were reviewed.    Allergies:  No Known Allergies    Physical Exam    VITALS:    Vitals:    09/27/22 1356   BP: 158/98   Pulse: 62   Temp: 97.8 °F (36.6 °C)   SpO2: 98%     Body mass index is 26.9 kg/m².    GENERAL:  Looks stated age, mild overweight  HEAD/EYES:  N/C, A/T, Mask in place  EXTREMITIES:  FROM  CNS:  A&Ox3    Full exam not done today.      Labs/Imaging  All available lab and imaging data were reviewed.      Lauro Zavala MD, LYDIA, FACE, ECNU  9/27/2022  14:52 EDT

## 2022-09-27 ENCOUNTER — OFFICE VISIT (OUTPATIENT)
Dept: ENDOCRINOLOGY | Age: 62
End: 2022-09-27

## 2022-09-27 VITALS
HEIGHT: 72 IN | DIASTOLIC BLOOD PRESSURE: 98 MMHG | TEMPERATURE: 97.8 F | SYSTOLIC BLOOD PRESSURE: 158 MMHG | HEART RATE: 62 BPM | WEIGHT: 198.4 LBS | OXYGEN SATURATION: 98 % | BODY MASS INDEX: 26.87 KG/M2

## 2022-09-27 DIAGNOSIS — R73.09 IMPAIRED GLUCOSE METABOLISM: ICD-10-CM

## 2022-09-27 DIAGNOSIS — E78.2 MIXED HYPERLIPIDEMIA: Primary | ICD-10-CM

## 2022-09-27 PROCEDURE — 99204 OFFICE O/P NEW MOD 45 MIN: CPT | Performed by: INTERNAL MEDICINE

## 2022-09-27 NOTE — PATIENT INSTRUCTIONS
As discussed will outline plan for your PCP.  Will check thyroid status today as that is key to these problems.    Focus on diet working to cut back on carbs both in quantity and frequency of having them.  Work to balance meals better with some protein with all meals.  Work on increasing activity and getting back to prior level of fitness.  And if weight could be lowered likely just 15 lbs expect many of these things would improve with the glucose and triglycerides likely getting better.

## 2022-09-28 LAB — TSH SERPL DL<=0.005 MIU/L-ACNC: 2.16 UIU/ML (ref 0.27–4.2)

## 2022-10-24 RX ORDER — OMEPRAZOLE 40 MG/1
CAPSULE, DELAYED RELEASE ORAL
Qty: 45 CAPSULE | Refills: 0 | Status: SHIPPED | OUTPATIENT
Start: 2022-10-24 | End: 2023-01-25

## 2022-11-01 ENCOUNTER — FLU SHOT (OUTPATIENT)
Dept: FAMILY MEDICINE CLINIC | Facility: CLINIC | Age: 62
End: 2022-11-01

## 2022-11-01 DIAGNOSIS — Z23 NEED FOR VACCINATION: ICD-10-CM

## 2022-11-01 PROCEDURE — 90686 IIV4 VACC NO PRSV 0.5 ML IM: CPT | Performed by: INTERNAL MEDICINE

## 2022-11-01 PROCEDURE — 90471 IMMUNIZATION ADMIN: CPT | Performed by: INTERNAL MEDICINE

## 2022-11-14 ENCOUNTER — OFFICE VISIT (OUTPATIENT)
Dept: CARDIOLOGY | Facility: CLINIC | Age: 62
End: 2022-11-14

## 2022-11-14 ENCOUNTER — TELEPHONE (OUTPATIENT)
Dept: CARDIOLOGY | Facility: CLINIC | Age: 62
End: 2022-11-14

## 2022-11-14 VITALS
BODY MASS INDEX: 26.01 KG/M2 | WEIGHT: 192 LBS | SYSTOLIC BLOOD PRESSURE: 120 MMHG | DIASTOLIC BLOOD PRESSURE: 80 MMHG | HEIGHT: 72 IN | HEART RATE: 52 BPM

## 2022-11-14 DIAGNOSIS — I48.0 PAF (PAROXYSMAL ATRIAL FIBRILLATION): Primary | ICD-10-CM

## 2022-11-14 DIAGNOSIS — R00.2 PALPITATIONS: ICD-10-CM

## 2022-11-14 DIAGNOSIS — I10 ESSENTIAL HYPERTENSION: ICD-10-CM

## 2022-11-14 DIAGNOSIS — Z79.01 CHRONIC ANTICOAGULATION: ICD-10-CM

## 2022-11-14 DIAGNOSIS — E78.2 MIXED HYPERLIPIDEMIA: ICD-10-CM

## 2022-11-14 DIAGNOSIS — G47.33 OSA (OBSTRUCTIVE SLEEP APNEA): ICD-10-CM

## 2022-11-14 PROCEDURE — 93000 ELECTROCARDIOGRAM COMPLETE: CPT | Performed by: INTERNAL MEDICINE

## 2022-11-14 PROCEDURE — 99214 OFFICE O/P EST MOD 30 MIN: CPT | Performed by: INTERNAL MEDICINE

## 2022-11-14 RX ORDER — APIXABAN 5 MG/1
TABLET, FILM COATED ORAL
Qty: 60 TABLET | Refills: 3 | Status: SHIPPED | OUTPATIENT
Start: 2022-11-14 | End: 2023-03-14

## 2022-11-14 NOTE — PROGRESS NOTES
Date of Office Visit: 2022  Encounter Provider: Kat Fischer MD  Place of Service: [facility] CARDIOLOGY  Patient Name: Elia Walsh  :1960    Chief complaint  Paroxysmal atrial fibrillation, hypertension    History of Present Illness  Patient is a 62-year-old gentleman with history of hypertension, hyperlipidemia, rectal bleeding and untreated sleep apnea.  And  he was noted to have atrial fibrillation.  Echocardiogram showed normal systolic function with ejection fraction of 60 to 65% and mild left ventricular perjury feet, no significant valvular heart disease.  Stress perfusion study in 2020 was negative for ischemia.  He was treated with Eliquis and metoprolol.    Since last visit he is not exercising.  He is unfortunately not using his dental guard as outlined by Dr. Decker sent a year ago.  He did try online devices but could not adjust this very well.  He talked to his dentist who did not have additional recommendations he had 1 episode of palpitations at a football game and it lasted about an hour.  He went home and rested.  He denies any major dietary indiscretions with salt he occasionally has beer but did not have much that night.  He occasionally has dyspnea he denies any edema or dizziness.    Blood work dated 2022 includes normal TSH.  CMP except for glucose of 128.  Triglycerides 316, HDL 32, LDL 59, CBC unremarkable      Past Medical History:   Diagnosis Date   • Allergic    • Anticoagulated    • BPH (benign prostatic hyperplasia)    • Cataract    • DJD (degenerative joint disease)    • Essential hypertension, benign    • GERD (gastroesophageal reflux disease)    • Glaucoma    • Grief reaction     mxl family members lost in few yrs time   • Impaired glucose metabolism 2019   • Marijuana use, continuous    • Mixed hyperlipidemia    • POLY (obstructive sleep apnea)     Home sleep study failed but pt does not want cpap. was reported as mild POLY  with AHI 7.4 events per hour, possibly underestimated. The patient snored 14% of the total monitoring time.   • PAF (paroxysmal atrial fibrillation) (HCC)    • Rectal bleeding      Past Surgical History:   Procedure Laterality Date   • COLONOSCOPY      2013   • COLONOSCOPY  04/16/2013    Jeffrey Ascencio MD   • COLONOSCOPY  09/29/2006    Jeffrey Ascencio MD   • ENDOSCOPY  05/28/2015    With biopsies, Al Haddad MD   • ENDOSCOPY  09/29/2006    With biopsies, Jeffrey Ascencio MD   • HEMORRHOIDECTOMY     • HERNIA REPAIR      X2     Outpatient Medications Prior to Visit   Medication Sig Dispense Refill   • finasteride (PROSCAR) 5 MG tablet Take 5 mg by mouth Daily.     • glucosamine-chondroitin 500-400 MG capsule capsule Take  by mouth Daily.     • icosapent ethyl (VASCEPA) 1 g capsule capsule TAKE TWO CAPSULES BY MOUTH TWICE A DAY WITH MEALS 120 capsule 5   • lisinopril (PRINIVIL,ZESTRIL) 20 MG tablet TAKE ONE TABLET BY MOUTH DAILY 90 tablet 1   • metoprolol tartrate (LOPRESSOR) 25 MG tablet TAKE ONE TABLET BY MOUTH TWICE A DAY 60 tablet 3   • Multiple Vitamins-Minerals (MULTIVITAL PO) Take  by mouth Daily.     • omeprazole (priLOSEC) 40 MG capsule TAKE ONE CAPSULE BY MOUTH EVERY OTHER DAY 45 capsule 0   • Rocklatan 0.02-0.005 % solution Every Night.     • rosuvastatin (CRESTOR) 20 MG tablet TAKE ONE TABLET BY MOUTH DAILY 90 tablet 1   • tamsulosin (FLOMAX) 0.4 MG capsule 24 hr capsule 1 capsule Every Other Day.     • timolol (TIMOPTIC) 0.5 % ophthalmic solution Administer 1 drop to both eyes 2 (Two) Times a Day.     • Eliquis 5 MG tablet tablet TAKE ONE TABLET BY MOUTH EVERY 12 HOURS 60 tablet 3     No facility-administered medications prior to visit.       Allergies as of 11/14/2022   • (No Known Allergies)     Social History     Socioeconomic History   • Marital status:    • Number of children: 4   Tobacco Use   • Smoking status: Former     Packs/day: 1.00     Years: 25.00     Pack years: 25.00      "Types: Cigarettes     Quit date: 1993     Years since quittin.5   • Smokeless tobacco: Never   • Tobacco comments:     35 years ago   Substance and Sexual Activity   • Alcohol use: Yes     Alcohol/week: 4.0 - 6.0 standard drinks     Types: 4 - 6 Cans of beer per week     Comment: socially/4 or 5/week; Daily caffeine use   • Drug use: Yes     Types: Marijuana     Comment: for glaucoma   • Sexual activity: Not Currently     Family History   Problem Relation Age of Onset   • Hypertension Mother 54   • Aneurysm Father 70     Review of Systems   Constitutional: Positive for weight loss. Negative for chills, fever and weight gain.   Cardiovascular: Negative for leg swelling.   Respiratory: Negative for cough, snoring and wheezing.    Hematologic/Lymphatic: Negative for bleeding problem. Does not bruise/bleed easily.   Skin: Negative for color change.   Musculoskeletal: Negative for falls, joint pain and myalgias.   Gastrointestinal: Negative for melena.   Genitourinary: Negative for hematuria.   Neurological: Positive for excessive daytime sleepiness.   Psychiatric/Behavioral: Negative for depression. The patient is not nervous/anxious.         Objective:     Vitals:    22 0801   BP: 120/80   Pulse: 52   Weight: 87.1 kg (192 lb)   Height: 182.9 cm (72\")     Body mass index is 26.04 kg/m².    Vitals reviewed.   Constitutional:       Appearance: Well-developed.   Eyes:      General: No scleral icterus.        Right eye: No discharge.      Conjunctiva/sclera: Conjunctivae normal.      Pupils: Pupils are equal, round, and reactive to light.   HENT:      Head: Normocephalic.      Nose: Nose normal.   Neck:      Thyroid: No thyromegaly.      Vascular: No JVD.   Pulmonary:      Effort: Pulmonary effort is normal. No respiratory distress.      Breath sounds: Normal breath sounds. No wheezing. No rales.   Cardiovascular:      Normal rate. Regular rhythm. Normal S1. Normal S2.      Murmurs: There is no murmur.      " No gallop.   Pulses:     Intact distal pulses.   Edema:     Peripheral edema absent.   Abdominal:      General: Bowel sounds are normal. There is no distension.      Palpations: Abdomen is soft.      Tenderness: There is no abdominal tenderness. There is no rebound.   Musculoskeletal: Normal range of motion.         General: No tenderness.      Cervical back: Normal range of motion and neck supple. Skin:     General: Skin is warm and dry.      Findings: No erythema or rash.   Neurological:      Mental Status: Alert and oriented to person, place, and time.   Psychiatric:         Behavior: Behavior normal.         Thought Content: Thought content normal.         Judgment: Judgment normal.       Lab Review:     ECG 12 Lead    Date/Time: 11/14/2022 8:37 AM  Performed by: Kat Fischer MD  Authorized by: Kat Fischer MD   Comparison: compared with previous ECG   Similar to previous ECG  Rhythm: sinus bradycardia    Clinical impression: normal ECG          Assessment:       Diagnosis Plan   1. PAF (paroxysmal atrial fibrillation) ([facility])  ECG 12 Lead      2. Palpitations  ECG 12 Lead      3. Essential hypertension        4. Mixed hyperlipidemia        5. Chronic anticoagulation        6. POLY (obstructive sleep apnea)          Plan:       1.  Paroxysmal atrial fibrillation.  He has likely early sick sinus syndrome with resting bradycardia.  I am hesitant to increase metoprolol further.  Have asked him to continue risk factor modification including better blood pressure control.  He will stay on Eliquis  2.  Hypertension.  He brings with him numerous blood pressure readings from home for the past year with a clear decrease in the summer months increased in the winter months.  I have asked him to increase his right exercise regimen and he will continue washdown a low-salt diet.  He will also bring in his blood pressure device for check as his home readings are much higher than his office reading.  Depending on the results of  his blood pressures over the next several weeks can titrate meds further though we will have to avoid increasing AV stacey blockers further due to bradycardia  3.  Obstructive sleep apnea and he plans an additional weight loss.  4.  Elevated glucose.  He admits he can do better with low carb diet.  5.  Dyslipidemia.  He is taking fish oil in the form of Vascepa.  However he still has some rectal bleeding and is on Eliquis.  I asked him to increase his exercise regimen to improve HDL and triglycerides therefore we can minimize use of omega-3 agents which also have blood thinning effects  6.  Rectal bleeding.  He believes this is due to internal hemorrhoids.  He will contact Dr. Colmenares regarding this.    STOP-Bang Score  Have you been diagnosed with Sleep Apnea?: no (Doesn't use machine)      Time Spent: I spent 35 minutes caring for Elia on this date of service. This time includes time spent by me in the following activities: preparing for the visit, reviewing tests, obtaining and/or reviewing a separately obtained history, performing a medically appropriate examination and/or evaluation, counseling and educating the patient/family/caregiver, ordering medications, tests, or procedures, documenting information in the medical record and independently interpreting results and communicating that information with the patient/family/caregiver.   I spent 1 minutes on the separately reported service of ECG. This time is not included in the time used to support the E/M service also reported today.        Your medication list          Accurate as of November 14, 2022 11:59 PM. If you have any questions, ask your nurse or doctor.            CONTINUE taking these medications      Instructions Last Dose Given Next Dose Due   Eliquis 5 MG tablet tablet  Generic drug: apixaban      TAKE ONE TABLET BY MOUTH EVERY 12 HOURS       finasteride 5 MG tablet  Commonly known as: PROSCAR      Take 5 mg by mouth Daily.        glucosamine-chondroitin 500-400 MG capsule capsule      Take  by mouth Daily.       icosapent ethyl 1 g capsule capsule  Commonly known as: VASCEPA      TAKE TWO CAPSULES BY MOUTH TWICE A DAY WITH MEALS       lisinopril 20 MG tablet  Commonly known as: PRINIVIL,ZESTRIL      TAKE ONE TABLET BY MOUTH DAILY       metoprolol tartrate 25 MG tablet  Commonly known as: LOPRESSOR      TAKE ONE TABLET BY MOUTH TWICE A DAY       multivitamin with minerals tablet tablet      Take  by mouth Daily.       omeprazole 40 MG capsule  Commonly known as: priLOSEC      TAKE ONE CAPSULE BY MOUTH EVERY OTHER DAY       Rocklatan 0.02-0.005 % solution  Generic drug: Netarsudil-Latanoprost      Every Night.       rosuvastatin 20 MG tablet  Commonly known as: CRESTOR      TAKE ONE TABLET BY MOUTH DAILY       tamsulosin 0.4 MG capsule 24 hr capsule  Commonly known as: FLOMAX      1 capsule Every Other Day.       timolol 0.5 % ophthalmic solution  Commonly known as: TIMOPTIC      Administer 1 drop to both eyes 2 (Two) Times a Day.              Patient is no longer taking -.  I corrected the med list to reflect this.  I did not stop these medications.      Dictated utilizing Dragon dictation

## 2023-01-03 RX ORDER — LISINOPRIL 20 MG/1
TABLET ORAL
Qty: 90 TABLET | Refills: 1 | Status: SHIPPED | OUTPATIENT
Start: 2023-01-03 | End: 2023-03-15 | Stop reason: DRUGHIGH

## 2023-01-25 RX ORDER — OMEPRAZOLE 40 MG/1
CAPSULE, DELAYED RELEASE ORAL
Qty: 45 CAPSULE | Refills: 0 | Status: SHIPPED | OUTPATIENT
Start: 2023-01-25

## 2023-02-06 RX ORDER — ICOSAPENT ETHYL 1000 MG/1
CAPSULE ORAL
Qty: 120 CAPSULE | Refills: 5 | Status: SHIPPED | OUTPATIENT
Start: 2023-02-06

## 2023-02-22 RX ORDER — ROSUVASTATIN CALCIUM 20 MG/1
TABLET, COATED ORAL
Qty: 90 TABLET | Refills: 1 | Status: SHIPPED | OUTPATIENT
Start: 2023-02-22

## 2023-03-14 RX ORDER — APIXABAN 5 MG/1
TABLET, FILM COATED ORAL
Qty: 60 TABLET | Refills: 3 | Status: SHIPPED | OUTPATIENT
Start: 2023-03-14

## 2023-03-15 ENCOUNTER — OFFICE VISIT (OUTPATIENT)
Dept: FAMILY MEDICINE CLINIC | Facility: CLINIC | Age: 63
End: 2023-03-15
Payer: COMMERCIAL

## 2023-03-15 VITALS
HEART RATE: 55 BPM | DIASTOLIC BLOOD PRESSURE: 98 MMHG | WEIGHT: 193 LBS | TEMPERATURE: 98.2 F | OXYGEN SATURATION: 99 % | BODY MASS INDEX: 26.14 KG/M2 | HEIGHT: 72 IN | SYSTOLIC BLOOD PRESSURE: 178 MMHG

## 2023-03-15 DIAGNOSIS — Z00.00 PHYSICAL EXAM, ANNUAL: Primary | ICD-10-CM

## 2023-03-15 DIAGNOSIS — M25.512 ACUTE PAIN OF LEFT SHOULDER: ICD-10-CM

## 2023-03-15 DIAGNOSIS — Z12.5 PROSTATE CANCER SCREENING: ICD-10-CM

## 2023-03-15 PROCEDURE — 99213 OFFICE O/P EST LOW 20 MIN: CPT | Performed by: INTERNAL MEDICINE

## 2023-03-15 PROCEDURE — 0124A COVID-19 (PFIZER) BIVALENT BOOSTER 12+YRS: CPT | Performed by: INTERNAL MEDICINE

## 2023-03-15 PROCEDURE — 99396 PREV VISIT EST AGE 40-64: CPT | Performed by: INTERNAL MEDICINE

## 2023-03-15 PROCEDURE — 91312 COVID-19 (PFIZER) BIVALENT BOOSTER 12+YRS: CPT | Performed by: INTERNAL MEDICINE

## 2023-03-15 RX ORDER — LISINOPRIL 40 MG/1
40 TABLET ORAL DAILY
Qty: 90 TABLET | Refills: 1 | Status: SHIPPED | OUTPATIENT
Start: 2023-03-15

## 2023-03-15 RX ORDER — PRAMOXINE HYDROCHLORIDE HYDROCORTISONE ACETATE 100; 100 MG/10G; MG/10G
1 AEROSOL, FOAM TOPICAL 2 TIMES DAILY
Qty: 10 G | Refills: 2 | Status: SHIPPED | OUTPATIENT
Start: 2023-03-15

## 2023-03-15 NOTE — PROGRESS NOTES
"Chief Complaint  Hypertension (6 month follow up/Not fasting (coffee with creamer))    Subjective        Elia Walsh presents to [facility]  History of Present Illness patient was seen for a physical.  Patient diet physical activity discussed this visit.  Patient also developed left shoulder pain.  Patient had a prior history of torn rotator cuff of the left shoulder and received a steroid injection with relief.  The pain is returned and he is being rescheduled for orthopedic consult.  Patient did not want to go to physical therapy at this time.  Patient was informed the only over-the-counter medication he could use was Tylenol because of his Eliquis.    Dictated utilizing Dragon dictation. If there are questions or for further clarification, please contact me.    Objective   Vital Signs:  Blood Pressure 178/98 (BP Location: Left arm, Patient Position: Sitting, Cuff Size: Adult)   Pulse 55   Temperature 98.2 °F (36.8 °C)   Height 182.9 cm (72.01\")   Weight 87.5 kg (193 lb)   Oxygen Saturation 99%   Body Mass Index 26.17 kg/m²   Estimated body mass index is 26.17 kg/m² as calculated from the following:    Height as of this encounter: 182.9 cm (72.01\").    Weight as of this encounter: 87.5 kg (193 lb).             Physical Exam  Vitals and nursing note reviewed.   Constitutional:       General: He is not in acute distress.     Appearance: Normal appearance. He is well-developed. He is not ill-appearing, toxic-appearing or diaphoretic.   HENT:      Head: Normocephalic and atraumatic.      Right Ear: Tympanic membrane, ear canal and external ear normal. There is no impacted cerumen.      Left Ear: Tympanic membrane, ear canal and external ear normal. There is no impacted cerumen.      Nose: Nose normal. No congestion or rhinorrhea.      Mouth/Throat:      Mouth: Mucous membranes are moist.      Pharynx: Oropharynx is clear. No oropharyngeal exudate or posterior oropharyngeal " erythema.   Eyes:      General: No scleral icterus.        Right eye: No discharge.         Left eye: No discharge.      Extraocular Movements: Extraocular movements intact.      Conjunctiva/sclera: Conjunctivae normal.      Pupils: Pupils are equal, round, and reactive to light.   Neck:      Thyroid: No thyromegaly.      Vascular: No carotid bruit or JVD.      Trachea: No tracheal deviation.   Cardiovascular:      Rate and Rhythm: Normal rate and regular rhythm.      Heart sounds: Normal heart sounds. No murmur heard.    No friction rub. No gallop.   Pulmonary:      Effort: Pulmonary effort is normal. No respiratory distress.      Breath sounds: Normal breath sounds. No stridor. No wheezing, rhonchi or rales.   Chest:      Chest wall: No tenderness.   Abdominal:      General: Bowel sounds are normal. There is no distension.      Palpations: Abdomen is soft. There is no mass.      Tenderness: There is no abdominal tenderness. There is no right CVA tenderness, left CVA tenderness, guarding or rebound.      Hernia: No hernia is present.   Musculoskeletal:         General: Swelling and tenderness present. No deformity or signs of injury. Normal range of motion.      Cervical back: Normal range of motion and neck supple. No rigidity. No muscular tenderness.      Right lower leg: No edema.      Left lower leg: No edema.   Lymphadenopathy:      Cervical: No cervical adenopathy.   Skin:     General: Skin is warm and dry.      Coloration: Skin is not jaundiced or pale.      Findings: No bruising, erythema, lesion or rash.   Neurological:      General: No focal deficit present.      Mental Status: He is alert and oriented to person, place, and time. Mental status is at baseline.      Cranial Nerves: No cranial nerve deficit.      Sensory: No sensory deficit.      Motor: No weakness or abnormal muscle tone.      Coordination: Coordination normal.      Gait: Gait normal.      Deep Tendon Reflexes: Reflexes normal.    Psychiatric:         Mood and Affect: Mood normal.         Behavior: Behavior normal.         Thought Content: Thought content normal.         Judgment: Judgment normal.        Result Review :                   Assessment and Plan  #1 physical #2 lab #3 COVID vaccine #4 referral to orthopedic  Diagnoses and all orders for this visit:    1. Physical exam, annual (Primary)  -     CBC (No Diff)  -     Comprehensive Metabolic Panel  -     Lipid Panel  -     PSA Screen    2. Prostate cancer screening  -     CBC (No Diff)  -     Comprehensive Metabolic Panel  -     Lipid Panel  -     PSA Screen    3. Acute pain of left shoulder  -     Ambulatory Referral to Orthopedic Surgery    Other orders  -     COVID-19 Bivalent Booster (Pfizer) 12+yrs  -     Hydrocort-Pramoxine, Perianal, (Proctofoam HC) 1-1 % rectal foam; Insert 1 application into the rectum 2 (Two) Times a Day.  Dispense: 10 g; Refill: 2  -     lisinopril (PRINIVIL,ZESTRIL) 40 MG tablet; Take 1 tablet by mouth Daily.  Dispense: 90 tablet; Refill: 1             Follow Up   Return in about 2 months (around 5/15/2023), or if symptoms worsen or fail to improve, for Recheck.  Patient was given instructions and counseling regarding his condition or for health maintenance advice. Please see specific information pulled into the AVS if appropriate.

## 2023-03-16 LAB
ALBUMIN SERPL-MCNC: 4.7 G/DL (ref 3.5–5.2)
ALBUMIN/GLOB SERPL: 1.7 G/DL
ALP SERPL-CCNC: 77 U/L (ref 39–117)
ALT SERPL-CCNC: 22 U/L (ref 1–41)
AST SERPL-CCNC: 24 U/L (ref 1–40)
BILIRUB SERPL-MCNC: 0.6 MG/DL (ref 0–1.2)
BUN SERPL-MCNC: 16 MG/DL (ref 8–23)
BUN/CREAT SERPL: 17 (ref 7–25)
CALCIUM SERPL-MCNC: 10.3 MG/DL (ref 8.6–10.5)
CHLORIDE SERPL-SCNC: 100 MMOL/L (ref 98–107)
CHOLEST SERPL-MCNC: 148 MG/DL (ref 0–200)
CO2 SERPL-SCNC: 28.8 MMOL/L (ref 22–29)
CREAT SERPL-MCNC: 0.94 MG/DL (ref 0.76–1.27)
EGFRCR SERPLBLD CKD-EPI 2021: 90.5 ML/MIN/1.73
ERYTHROCYTE [DISTWIDTH] IN BLOOD BY AUTOMATED COUNT: 13.1 % (ref 12.3–15.4)
GLOBULIN SER CALC-MCNC: 2.8 GM/DL
GLUCOSE SERPL-MCNC: 110 MG/DL (ref 65–99)
HCT VFR BLD AUTO: 46.2 % (ref 37.5–51)
HDLC SERPL-MCNC: 41 MG/DL (ref 40–60)
HGB BLD-MCNC: 15.9 G/DL (ref 13–17.7)
LDLC SERPL CALC-MCNC: 89 MG/DL (ref 0–100)
MCH RBC QN AUTO: 33.1 PG (ref 26.6–33)
MCHC RBC AUTO-ENTMCNC: 34.4 G/DL (ref 31.5–35.7)
MCV RBC AUTO: 96 FL (ref 79–97)
PLATELET # BLD AUTO: 230 10*3/MM3 (ref 140–450)
POTASSIUM SERPL-SCNC: 4.8 MMOL/L (ref 3.5–5.2)
PROT SERPL-MCNC: 7.5 G/DL (ref 6–8.5)
PSA SERPL-MCNC: 2.05 NG/ML (ref 0–4)
RBC # BLD AUTO: 4.81 10*6/MM3 (ref 4.14–5.8)
SODIUM SERPL-SCNC: 138 MMOL/L (ref 136–145)
TRIGL SERPL-MCNC: 96 MG/DL (ref 0–150)
VLDLC SERPL CALC-MCNC: 18 MG/DL (ref 5–40)
WBC # BLD AUTO: 7.74 10*3/MM3 (ref 3.4–10.8)

## 2023-04-03 ENCOUNTER — TELEPHONE (OUTPATIENT)
Dept: FAMILY MEDICINE CLINIC | Facility: CLINIC | Age: 63
End: 2023-04-03
Payer: COMMERCIAL

## 2023-04-03 NOTE — TELEPHONE ENCOUNTER
Pt stated that he has eardrops enough until Wed was wondering if he can get a refill of the ear drops.  Symptoms aren't getting any worse just was concerned about the ear bleeding and would like to be scheduled for a earlier date than previously scheduled

## 2023-04-03 NOTE — TELEPHONE ENCOUNTER
"  Caller: Elia Walsh \"Godfrey\"    Relationship: Self    Best call back number: [phone]  What is the best time to reach you: ANY     Who are you requesting to speak with (clinical staff, provider,  specific staff member): CLINICAL STAFF     What was the call regarding: PATIENT IS CALLING IN STATES THAT HE WAS SEEN IN URGENT CARE 03/29 AND WAS TREATED FOR A PUNCTURED EAR DRUM.  HE HAS AN APPOINTMENT 04/27 TO SEE AND ENT.  HE IS CONCERNED ABOUT INFECTION AND IF THERE SHOULD HAVE AN ANTIBIOTIC CALLED IN    [facility] [id number] [place], [place] - [address] AT [facility] & ([address]) - [phone]  - [phone]  P: [phone]  F: [phone]         Do you require a callback:YES         "

## 2023-04-03 NOTE — TELEPHONE ENCOUNTER
Please advise patient that he will need to speak with ENT office to see if he can get that scheduled we will not be able to do that for him.  I cannot refill any type of ear medication due to him following ENT.  Thank you

## 2023-04-14 ENCOUNTER — TELEPHONE (OUTPATIENT)
Dept: FAMILY MEDICINE CLINIC | Facility: CLINIC | Age: 63
End: 2023-04-14

## 2023-04-14 ENCOUNTER — TELEPHONE (OUTPATIENT)
Dept: FAMILY MEDICINE CLINIC | Facility: CLINIC | Age: 63
End: 2023-04-14
Payer: COMMERCIAL

## 2023-04-14 NOTE — TELEPHONE ENCOUNTER
"     Caller: Elia Walsh \"Godfrey\"    Relationship to patient: Self    Best call back number: [phone]    Chief complaint: ESTABLISH CARE, OFFBOARDING DR HARTLEY     Type of visit: NEW PATIENT     Requested date:     Additional notes: DAUGHTER IS A PATIENT OF DR WALSH AND HE WOULD LIKE TO ESTABLISH CARE WITH DR WALSH TO STAY WITH [facility] SINCE DR HARTLEY IS LEAVING.           "

## 2023-04-24 RX ORDER — OMEPRAZOLE 40 MG/1
CAPSULE, DELAYED RELEASE ORAL
Qty: 45 CAPSULE | Refills: 0 | Status: SHIPPED | OUTPATIENT
Start: 2023-04-24

## 2023-05-08 ENCOUNTER — OFFICE VISIT (OUTPATIENT)
Dept: FAMILY MEDICINE CLINIC | Facility: CLINIC | Age: 63
End: 2023-05-08
Payer: MEDICARE

## 2023-05-08 VITALS
SYSTOLIC BLOOD PRESSURE: 160 MMHG | OXYGEN SATURATION: 99 % | RESPIRATION RATE: 18 BRPM | BODY MASS INDEX: 27.22 KG/M2 | WEIGHT: 201 LBS | HEART RATE: 55 BPM | DIASTOLIC BLOOD PRESSURE: 92 MMHG | HEIGHT: 72 IN

## 2023-05-08 DIAGNOSIS — I48.0 PAROXYSMAL ATRIAL FIBRILLATION: ICD-10-CM

## 2023-05-08 DIAGNOSIS — E78.2 MIXED HYPERLIPIDEMIA: ICD-10-CM

## 2023-05-08 DIAGNOSIS — R73.09 IMPAIRED GLUCOSE METABOLISM: ICD-10-CM

## 2023-05-08 DIAGNOSIS — I10 ESSENTIAL HYPERTENSION: Primary | ICD-10-CM

## 2023-05-08 PROCEDURE — 3077F SYST BP >= 140 MM HG: CPT | Performed by: INTERNAL MEDICINE

## 2023-05-08 PROCEDURE — 3080F DIAST BP >= 90 MM HG: CPT | Performed by: INTERNAL MEDICINE

## 2023-05-08 PROCEDURE — 99213 OFFICE O/P EST LOW 20 MIN: CPT | Performed by: INTERNAL MEDICINE

## 2023-05-08 RX ORDER — LISINOPRIL 40 MG/1
40 TABLET ORAL DAILY
Qty: 90 TABLET | Refills: 1 | Status: SHIPPED | OUTPATIENT
Start: 2023-05-08

## 2023-05-08 RX ORDER — ICOSAPENT ETHYL 1000 MG/1
2 CAPSULE ORAL 2 TIMES DAILY WITH MEALS
Qty: 360 CAPSULE | Refills: 1 | Status: SHIPPED | OUTPATIENT
Start: 2023-05-08

## 2023-05-08 RX ORDER — ROSUVASTATIN CALCIUM 20 MG/1
20 TABLET, COATED ORAL DAILY
Qty: 90 TABLET | Refills: 1 | Status: SHIPPED | OUTPATIENT
Start: 2023-05-08

## 2023-05-08 NOTE — PROGRESS NOTES
Subjective Chief complaint is follow-up on hypertension  Elia Walsh is a 62 y.o. male.     History of Present Illness Godfrey is here today to follow-up on hypertension.  At his last visit for a physical exam his blood pressure was elevated and his lisinopril was maximized out to 40 mg daily.  He also takes metoprolol 25 mg twice daily.  I did retake his blood pressure today at 144/80.  This is his first visit with a new physician.  He is not having headaches, dizziness, chest pain, or shortness of breath.  He might occasionally get some indentation at the sock line by the end of the day but resolves overnight.  He does have a history of atrial fibrillation but seems to be in sinus rhythm here today.  His heart rate is bradycardic.  He is also on some atenolol for his glaucoma.    The following portions of the patient's history were reviewed and updated as appropriate: allergies, current medications, past family history, past medical history, past social history, past surgical history and problem list.    Review of Systems   Constitutional: Negative for chills and fever.   HENT: Negative for nosebleeds, sore throat and trouble swallowing.    Eyes: Negative for blurred vision, double vision and visual disturbance.   Respiratory: Negative for chest tightness and shortness of breath.    Cardiovascular: Negative for chest pain.   Gastrointestinal: Negative for blood in stool.   Genitourinary: Negative for hematuria.   Neurological: Negative for dizziness, light-headedness and headache.       Objective   Physical Exam  Vitals and nursing note reviewed.   Constitutional:       Appearance: Normal appearance.   Neck:      Vascular: No carotid bruit.   Cardiovascular:      Rate and Rhythm: Regular rhythm. Bradycardia present.      Pulses: Normal pulses.      Heart sounds: No murmur heard.    No friction rub. No gallop.   Pulmonary:      Effort: Pulmonary effort is normal.      Breath sounds: No wheezing, rhonchi or  rales.   Abdominal:      General: Bowel sounds are normal.      Palpations: Abdomen is soft.      Tenderness: There is no abdominal tenderness. There is no guarding or rebound.   Musculoskeletal:      Right lower leg: No edema.      Left lower leg: No edema.   Neurological:      Mental Status: He is alert.   Psychiatric:         Mood and Affect: Mood normal.         Behavior: Behavior normal.           Assessment & Plan   Diagnoses and all orders for this visit:    1. Essential hypertension (Primary)    2. Paroxysmal atrial fibrillation (HCC)    3. Mixed hyperlipidemia    4. Impaired glucose metabolism    Other orders  -     apixaban (Eliquis) 5 MG tablet tablet; Take 1 tablet by mouth Every 12 (Twelve) Hours.  Dispense: 180 tablet; Refill: 1  -     icosapent ethyl (VASCEPA) 1 g capsule capsule; Take 2 g by mouth 2 (Two) Times a Day With Meals.  Dispense: 360 capsule; Refill: 1  -     lisinopril (PRINIVIL,ZESTRIL) 40 MG tablet; Take 1 tablet by mouth Daily.  Dispense: 90 tablet; Refill: 1  -     metoprolol tartrate (LOPRESSOR) 25 MG tablet; Take 1 tablet by mouth 2 (Two) Times a Day.  Dispense: 180 tablet; Refill: 1  -     rosuvastatin (CRESTOR) 20 MG tablet; Take 1 tablet by mouth Daily.  Dispense: 90 tablet; Refill: 1    Godfrey is here today for establishment of care.  His blood pressure was little better to my exam, not going to make any changes there.  I am going to see him back in 6 months.  His most recent lab work and physical looked okay.

## 2023-05-12 ENCOUNTER — PATIENT ROUNDING (BHMG ONLY) (OUTPATIENT)
Dept: FAMILY MEDICINE CLINIC | Facility: CLINIC | Age: 63
End: 2023-05-12
Payer: MEDICARE

## 2023-05-26 ENCOUNTER — OFFICE VISIT (OUTPATIENT)
Dept: CARDIOLOGY | Facility: CLINIC | Age: 63
End: 2023-05-26
Payer: MEDICARE

## 2023-05-26 VITALS
HEART RATE: 54 BPM | SYSTOLIC BLOOD PRESSURE: 158 MMHG | DIASTOLIC BLOOD PRESSURE: 88 MMHG | BODY MASS INDEX: 26.41 KG/M2 | HEIGHT: 72 IN | WEIGHT: 195 LBS

## 2023-05-26 DIAGNOSIS — I10 ESSENTIAL HYPERTENSION: ICD-10-CM

## 2023-05-26 DIAGNOSIS — E78.2 HYPERLIPEMIA, MIXED: ICD-10-CM

## 2023-05-26 DIAGNOSIS — Z79.01 CHRONIC ANTICOAGULATION: ICD-10-CM

## 2023-05-26 DIAGNOSIS — I48.0 PAF (PAROXYSMAL ATRIAL FIBRILLATION): Primary | ICD-10-CM

## 2023-05-26 DIAGNOSIS — G47.33 OSA (OBSTRUCTIVE SLEEP APNEA): ICD-10-CM

## 2023-05-26 NOTE — PROGRESS NOTES
Date of Office Visit: 2023  Encounter Provider: TERA Buchanan  Place of Service: [facility] CARDIOLOGY  Patient Name: Elia Walsh  :1960    Chief complaint  Follow-up paroxysmal atrial fibrillation    History of Present Illness  Patient is a 63-year-old male patient of Dr. Fischer.  Past medical history includes  hypertension, hyperlipidemia, rectal bleeding and untreated sleep apnea.  And  he was noted to have atrial fibrillation.  Echocardiogram showed normal systolic function with ejection fraction of 60 to 65% and mild left ventricular perjury feet, no significant valvular heart disease.  Stress perfusion study in 2020 was negative for ischemia.  He was treated with Eliquis and metoprolol.    Interval history  Patient presents today for routine follow-up.  I will visit with him for the first time today and have reviewed his medical record.  Since last visit he is doing well.  Unfortunately he is not using CPAP.  He denies palpitations, edema, chest pain or chest pressure, syncope or presyncope.  He states he has stable shortness of breath and fatigue that are at baseline.  He does have dizziness that is worse after bending over and standing up quickly.  He is exercising regularly by walking and playing golf and denies exertional symptoms.  He denies falls or abnormal bleeding on Eliquis.  Blood pressure is elevated in office but he is not checking blood pressure at home regularly.     Labs 3/15/2023 show creatinine 0.94, sodium 138, potassium 4.8, calcium 10.3, albumin 4.7.  Total cholesterol 148, triglycerides 96, HDL 41, LDL 89.  Hemoglobin 15.9, platelets 230.  PSA 2.050  Past Medical History:   Diagnosis Date   • Allergic    • Anticoagulated    • BPH (benign prostatic hyperplasia)    • Cataract    • DJD (degenerative joint disease)    • Essential hypertension, benign    • GERD (gastroesophageal reflux disease)    • Glaucoma    • Grief reaction     mxl  family members lost in few yrs time   • Impaired glucose metabolism 2019   • Marijuana use, continuous    • Mixed hyperlipidemia    • POLY (obstructive sleep apnea) 2020    Home sleep study failed but pt does not want cpap. was reported as mild POLY with AHI 7.4 events per hour, possibly underestimated. The patient snored 14% of the total monitoring time.   • PAF (paroxysmal atrial fibrillation)    • Rectal bleeding      Past Surgical History:   Procedure Laterality Date   • COLONOSCOPY      2013   • COLONOSCOPY  04/16/2013    Jeffrey Ascencio MD   • COLONOSCOPY  09/29/2006    Jeffrey Ascencio MD   • ENDOSCOPY  05/28/2015    With biopsies, Al Haddad MD   • ENDOSCOPY  09/29/2006    With biopsies, Jeffrey Ascencio MD   • HEMORRHOIDECTOMY     • HERNIA REPAIR      X2     Outpatient Medications Prior to Visit   Medication Sig Dispense Refill   • apixaban (Eliquis) 5 MG tablet tablet Take 1 tablet by mouth Every 12 (Twelve) Hours. 180 tablet 1   • finasteride (PROSCAR) 5 MG tablet Take 1 tablet by mouth Daily.     • Hydrocort-Pramoxine, Perianal, (Proctofoam HC) 1-1 % rectal foam Insert 1 application into the rectum 2 (Two) Times a Day. 10 g 2   • icosapent ethyl (VASCEPA) 1 g capsule capsule Take 2 g by mouth 2 (Two) Times a Day With Meals. 360 capsule 1   • lisinopril (PRINIVIL,ZESTRIL) 40 MG tablet Take 1 tablet by mouth Daily. 90 tablet 1   • metoprolol tartrate (LOPRESSOR) 25 MG tablet Take 1 tablet by mouth 2 (Two) Times a Day. 180 tablet 1   • Multiple Vitamins-Minerals (MULTIVITAL PO) Take  by mouth Daily.     • omeprazole (priLOSEC) 40 MG capsule TAKE ONE CAPSULE BY MOUTH EVERY OTHER DAY 45 capsule 0   • Rocklatan 0.02-0.005 % solution Every Night.     • rosuvastatin (CRESTOR) 20 MG tablet Take 1 tablet by mouth Daily. 90 tablet 1   • tamsulosin (FLOMAX) 0.4 MG capsule 24 hr capsule 1 capsule Every Other Day.     • timolol (TIMOPTIC) 0.5 % ophthalmic solution Administer 1 drop to both eyes 2 (Two) Times a  "Day.     • glucosamine-chondroitin 500-400 MG capsule capsule Take  by mouth Daily. (Patient not taking: Reported on 2023)       No facility-administered medications prior to visit.       Allergies as of 2023   • (No Known Allergies)     Social History     Socioeconomic History   • Marital status:    • Number of children: 4   Tobacco Use   • Smoking status: Former     Packs/day: 1.00     Years: 25.00     Pack years: 25.00     Types: Cigarettes     Quit date: 1993     Years since quittin.0   • Smokeless tobacco: Never   • Tobacco comments:     35 years ago   Vaping Use   • Vaping Use: Never used   Substance and Sexual Activity   • Alcohol use: Yes     Alcohol/week: 4.0 - 6.0 standard drinks     Types: 4 - 6 Cans of beer per week     Comment: socially/4 or 5/week; Daily caffeine use   • Drug use: Yes     Types: Marijuana     Comment: for glaucoma   • Sexual activity: Not Currently     Family History   Problem Relation Age of Onset   • Hypertension Mother    • Aneurysm Father 70     Review of Systems   Constitutional: Positive for malaise/fatigue.   HENT: Negative for nosebleeds.    Cardiovascular: Negative for chest pain, claudication, dyspnea on exertion, leg swelling, near-syncope, orthopnea, palpitations, paroxysmal nocturnal dyspnea and syncope.   Respiratory: Positive for shortness of breath.    Hematologic/Lymphatic: Negative for bleeding problem. Does not bruise/bleed easily.   Gastrointestinal: Negative for hematemesis, hematochezia and melena.   Genitourinary: Negative for hematuria.   Neurological: Positive for dizziness. Negative for brief paralysis, headaches and light-headedness.   All other systems reviewed and are negative.       Objective:     Vitals:    23 0926   BP: 158/88   Pulse: 54   Weight: 88.5 kg (195 lb)   Height: 182.9 cm (72\")     Body mass index is 26.45 kg/m².    Vitals reviewed.   Constitutional:       General: Not in acute distress.     Appearance: " Well-developed and not in distress. Not diaphoretic.   HENT:      Head: Normocephalic.   Pulmonary:      Effort: Pulmonary effort is normal. No respiratory distress.      Breath sounds: Normal breath sounds. No wheezing. No rhonchi. No rales.   Cardiovascular:      Normal rate. Regular rhythm.      Murmurs: There is no murmur.   Pulses:     Intact distal pulses.   Edema:     Peripheral edema absent.   Skin:     General: Skin is warm and dry. There is no cyanosis.      Findings: No rash.   Neurological:      Mental Status: Alert and oriented to person, place, and time.   Psychiatric:         Behavior: Behavior normal. Behavior is cooperative.         Thought Content: Thought content normal.         Judgment: Judgment normal.       Lab Review:     ECG 12 Lead    Date/Time: 5/26/2023 9:40 AM  Performed by: Therese Henderson APRN  Authorized by: Therese Henderson APRN   Comparison: compared with previous ECG   Similar to previous ECG  Rhythm: sinus rhythm  Rate: normal  BPM: 54  QRS axis: normal    Clinical impression: normal ECG  Comments: Similar to prior.  No new ischemic changes          Assessment:       Diagnosis Plan   1. PAF (paroxysmal atrial fibrillation)        2. Essential hypertension        3. POLY (obstructive sleep apnea)        4. Hyperlipemia, mixed        5. Chronic anticoagulation          Plan:       1.  Paroxysmal atrial fibrillation.  He has likely early sick sinus syndrome with resting bradycardia.  I am hesitant to increase metoprolol further due to this.  Have asked him to continue risk factor modification including better blood pressure control.  He will stay on Eliquis and denies falls or abnormal bleeding.  2.  Hypertension.  I asked him to check blood pressure regularly at home and call if consistently greater than 130/80.  Would favor adding amlodipine/hydralazine as he is already on high-dose lisinopril and has resting bradycardia on low-dose metoprolol.  3.  Obstructive sleep apnea   that is currently untreated but he plans an additional weight loss.  4.  Dyslipidemia.  He is taking fish oil in the form of Vascepa.    He is also on statin therapy per PCP.  Lipids have improved recently.    Time Spent: I spent 30 minutes caring for Elia on this date of service. This time includes time spent by me in the following activities: preparing for the visit, reviewing tests, performing a medically appropriate examination and/or evaluation, counseling and educating the patient/family/caregiver, ordering medications, tests, or procedures and documenting information in the medical record.   I spent 1 minutes on the separately reported service of ECG. This time is not included in the time used to support the E/M service also reported today.        Your medication list          Accurate as of May 26, 2023  9:40 AM. If you have any questions, ask your nurse or doctor.            CONTINUE taking these medications      Instructions Last Dose Given Next Dose Due   apixaban 5 MG tablet tablet  Commonly known as: Eliquis      Take 1 tablet by mouth Every 12 (Twelve) Hours.       finasteride 5 MG tablet  Commonly known as: PROSCAR      Take 1 tablet by mouth Daily.       icosapent ethyl 1 g capsule capsule  Commonly known as: VASCEPA      Take 2 g by mouth 2 (Two) Times a Day With Meals.       lisinopril 40 MG tablet  Commonly known as: PRINIVIL,ZESTRIL      Take 1 tablet by mouth Daily.       metoprolol tartrate 25 MG tablet  Commonly known as: LOPRESSOR      Take 1 tablet by mouth 2 (Two) Times a Day.       multivitamin with minerals tablet tablet      Take  by mouth Daily.       omeprazole 40 MG capsule  Commonly known as: priLOSEC      TAKE ONE CAPSULE BY MOUTH EVERY OTHER DAY       Proctofoam HC 1-1 % rectal foam  Generic drug: Hydrocort-Pramoxine (Perianal)      Insert 1 application into the rectum 2 (Two) Times a Day.       Rocklatan 0.02-0.005 % solution  Generic drug: Netarsudil-Latanoprost      Every  Night.       rosuvastatin 20 MG tablet  Commonly known as: CRESTOR      Take 1 tablet by mouth Daily.       tamsulosin 0.4 MG capsule 24 hr capsule  Commonly known as: FLOMAX      1 capsule Every Other Day.       timolol 0.5 % ophthalmic solution  Commonly known as: TIMOPTIC      Administer 1 drop to both eyes 2 (Two) Times a Day.              Patient is no longer taking -.  I corrected the med list to reflect this.  I did not stop these medications.      Dictated utilizing Dragon dictation

## 2023-07-26 DIAGNOSIS — K21.00 GASTROESOPHAGEAL REFLUX DISEASE WITH ESOPHAGITIS WITHOUT HEMORRHAGE: Primary | ICD-10-CM

## 2023-07-26 RX ORDER — OMEPRAZOLE 40 MG/1
40 CAPSULE, DELAYED RELEASE ORAL EVERY OTHER DAY
Qty: 45 CAPSULE | Refills: 0 | Status: SHIPPED | OUTPATIENT
Start: 2023-07-26

## 2023-07-26 NOTE — TELEPHONE ENCOUNTER
"  Caller: NicoElia \"Godfrey\"    Relationship: Self    Best call back number:[phone]     Requested Prescriptions:   Requested Prescriptions     Pending Prescriptions Disp Refills    omeprazole (priLOSEC) 40 MG capsule 45 capsule 0     Sig: Take 1 capsule by mouth Every Other Day.        Pharmacy where request should be sent: [facility] [id number] [place] [id number] [address] [facility] [phone] [facility] [phone]      Last office visit with prescribing clinician: 5/8/2023   Last telemedicine visit with prescribing clinician: Visit date not found   Next office visit with prescribing clinician: 11/8/2023       Does the patient have less than a 3 day supply:  [x] Yes  [] No    Would you like a call back once the refill request has been completed: [] Yes [x] No    If the office needs to give you a call back, can they leave a voicemail: [] Yes [x] No    Mary Carmen Stinson   07/26/23 12:23 EDT         "

## 2023-10-17 DIAGNOSIS — K21.00 GASTROESOPHAGEAL REFLUX DISEASE WITH ESOPHAGITIS WITHOUT HEMORRHAGE: ICD-10-CM

## 2023-10-17 RX ORDER — OMEPRAZOLE 40 MG/1
40 CAPSULE, DELAYED RELEASE ORAL EVERY OTHER DAY
Qty: 45 CAPSULE | Refills: 0 | Status: SHIPPED | OUTPATIENT
Start: 2023-10-17

## 2023-11-08 ENCOUNTER — OFFICE VISIT (OUTPATIENT)
Dept: FAMILY MEDICINE CLINIC | Facility: CLINIC | Age: 63
End: 2023-11-08
Payer: MEDICARE

## 2023-11-08 VITALS
WEIGHT: 198 LBS | BODY MASS INDEX: 26.82 KG/M2 | SYSTOLIC BLOOD PRESSURE: 180 MMHG | OXYGEN SATURATION: 98 % | HEART RATE: 62 BPM | HEIGHT: 72 IN | RESPIRATION RATE: 18 BRPM | DIASTOLIC BLOOD PRESSURE: 86 MMHG

## 2023-11-08 DIAGNOSIS — I10 ESSENTIAL HYPERTENSION: Primary | ICD-10-CM

## 2023-11-08 DIAGNOSIS — E78.2 MIXED HYPERLIPIDEMIA: ICD-10-CM

## 2023-11-08 DIAGNOSIS — Z23 IMMUNIZATION DUE: ICD-10-CM

## 2023-11-08 DIAGNOSIS — R73.09 IMPAIRED GLUCOSE METABOLISM: ICD-10-CM

## 2023-11-08 PROCEDURE — 3077F SYST BP >= 140 MM HG: CPT | Performed by: INTERNAL MEDICINE

## 2023-11-08 PROCEDURE — 90662 IIV NO PRSV INCREASED AG IM: CPT | Performed by: INTERNAL MEDICINE

## 2023-11-08 PROCEDURE — 99214 OFFICE O/P EST MOD 30 MIN: CPT | Performed by: INTERNAL MEDICINE

## 2023-11-08 PROCEDURE — 3079F DIAST BP 80-89 MM HG: CPT | Performed by: INTERNAL MEDICINE

## 2023-11-08 PROCEDURE — G0008 ADMIN INFLUENZA VIRUS VAC: HCPCS | Performed by: INTERNAL MEDICINE

## 2023-11-08 RX ORDER — AMLODIPINE BESYLATE 5 MG/1
5 TABLET ORAL DAILY
Qty: 30 TABLET | Refills: 5 | Status: SHIPPED | OUTPATIENT
Start: 2023-11-08

## 2023-11-08 NOTE — PROGRESS NOTES
Subjective Answers submitted by the patient for this visit:  Primary Reason for Visit (Submitted on 11/1/2023)  What is the primary reason for your visit?: High Blood Pressure    Elia Walsh is a 63 y.o. male.  Chief complaint is follow-up on blood pressure    History of Present Illness oGdfrey is here today for follow-up on his blood pressure.  At his last visit was a little bit high but we did take it and it improved some.  He brought in his readings at home and they have been running high there.  His blood pressure today is also elevated.  Admittedly he is salting his breakfast food but not adding salt to much other food.  He also is drinking at least 2 caffeinated beverages a day and sometimes 3.  We did discuss trying to eliminate some of that.  His heart rate is already in the low 60s and 50s and therefore I do not think we could add more metoprolol.  He is already on maximum dose of lisinopril.  We did discuss amlodipine and its side effects and we will try him on that.    The following portions of the patient's history were reviewed and updated as appropriate: allergies, current medications, and problem list.    Review of Systems   Eyes:  Negative for blurred vision.   Respiratory:  Negative for shortness of breath.    Cardiovascular:  Negative for chest pain and palpitations.   Musculoskeletal:  Negative for neck pain.     Objective   Physical Exam  Vitals and nursing note reviewed.   Constitutional:       Appearance: Normal appearance.   Cardiovascular:      Rate and Rhythm: Normal rate and regular rhythm.   Pulmonary:      Effort: Pulmonary effort is normal.      Breath sounds: No wheezing, rhonchi or rales.   Abdominal:      General: Bowel sounds are normal. There is no distension.      Palpations: Abdomen is soft.      Tenderness: There is no guarding or rebound.   Musculoskeletal:      Right lower leg: No edema.      Left lower leg: No edema.   Neurological:      Mental Status: He is alert.        Assessment & Plan   Diagnoses and all orders for this visit:    1. Essential hypertension (Primary)  -     CBC & Differential    2. Impaired glucose metabolism  -     Comprehensive Metabolic Panel  -     Hemoglobin A1c    3. Mixed hyperlipidemia  -     Lipid Panel    Other orders  -     amLODIPine (NORVASC) 5 MG tablet; Take 1 tablet by mouth Daily.  Dispense: 30 tablet; Refill: 5    Godfrey is here today for follow-up.  His blood pressure is elevated actually worse than it was at his last visit.  We are going to add some amlodipine.  He will come back in 4 weeks.  He will contact me if he has any significant side effect issues.

## 2023-11-09 LAB
ALBUMIN SERPL-MCNC: 4.5 G/DL (ref 3.9–4.9)
ALBUMIN/GLOB SERPL: 1.7 {RATIO} (ref 1.2–2.2)
ALP SERPL-CCNC: 80 IU/L (ref 44–121)
ALT SERPL-CCNC: 34 IU/L (ref 0–44)
AST SERPL-CCNC: 27 IU/L (ref 0–40)
BASOPHILS # BLD AUTO: 0.1 X10E3/UL (ref 0–0.2)
BASOPHILS NFR BLD AUTO: 1 %
BILIRUB SERPL-MCNC: 0.6 MG/DL (ref 0–1.2)
BUN SERPL-MCNC: 18 MG/DL (ref 8–27)
BUN/CREAT SERPL: 19 (ref 10–24)
CALCIUM SERPL-MCNC: 10 MG/DL (ref 8.6–10.2)
CHLORIDE SERPL-SCNC: 103 MMOL/L (ref 96–106)
CHOLEST SERPL-MCNC: 170 MG/DL (ref 100–199)
CO2 SERPL-SCNC: 23 MMOL/L (ref 20–29)
CREAT SERPL-MCNC: 0.97 MG/DL (ref 0.76–1.27)
EGFRCR SERPLBLD CKD-EPI 2021: 87 ML/MIN/1.73
EOSINOPHIL # BLD AUTO: 0.3 X10E3/UL (ref 0–0.4)
EOSINOPHIL NFR BLD AUTO: 3 %
ERYTHROCYTE [DISTWIDTH] IN BLOOD BY AUTOMATED COUNT: 13.6 % (ref 11.6–15.4)
GLOBULIN SER CALC-MCNC: 2.7 G/DL (ref 1.5–4.5)
GLUCOSE SERPL-MCNC: 105 MG/DL (ref 70–99)
HBA1C MFR BLD: 5.9 % (ref 4.8–5.6)
HCT VFR BLD AUTO: 47.1 % (ref 37.5–51)
HDLC SERPL-MCNC: 48 MG/DL
HGB BLD-MCNC: 16.3 G/DL (ref 13–17.7)
IMM GRANULOCYTES # BLD AUTO: 0 X10E3/UL (ref 0–0.1)
IMM GRANULOCYTES NFR BLD AUTO: 0 %
LDLC SERPL CALC-MCNC: 102 MG/DL (ref 0–99)
LYMPHOCYTES # BLD AUTO: 1.9 X10E3/UL (ref 0.7–3.1)
LYMPHOCYTES NFR BLD AUTO: 22 %
MCH RBC QN AUTO: 33.3 PG (ref 26.6–33)
MCHC RBC AUTO-ENTMCNC: 34.6 G/DL (ref 31.5–35.7)
MCV RBC AUTO: 96 FL (ref 79–97)
MONOCYTES # BLD AUTO: 0.7 X10E3/UL (ref 0.1–0.9)
MONOCYTES NFR BLD AUTO: 8 %
NEUTROPHILS # BLD AUTO: 5.6 X10E3/UL (ref 1.4–7)
NEUTROPHILS NFR BLD AUTO: 66 %
PLATELET # BLD AUTO: 218 X10E3/UL (ref 150–450)
POTASSIUM SERPL-SCNC: 4.5 MMOL/L (ref 3.5–5.2)
PROT SERPL-MCNC: 7.2 G/DL (ref 6–8.5)
RBC # BLD AUTO: 4.9 X10E6/UL (ref 4.14–5.8)
SODIUM SERPL-SCNC: 140 MMOL/L (ref 134–144)
TRIGL SERPL-MCNC: 111 MG/DL (ref 0–149)
VLDLC SERPL CALC-MCNC: 20 MG/DL (ref 5–40)
WBC # BLD AUTO: 8.5 X10E3/UL (ref 3.4–10.8)

## 2023-12-06 ENCOUNTER — OFFICE VISIT (OUTPATIENT)
Dept: FAMILY MEDICINE CLINIC | Facility: CLINIC | Age: 63
End: 2023-12-06
Payer: MEDICARE

## 2023-12-06 VITALS
SYSTOLIC BLOOD PRESSURE: 142 MMHG | OXYGEN SATURATION: 99 % | HEART RATE: 57 BPM | BODY MASS INDEX: 26.68 KG/M2 | DIASTOLIC BLOOD PRESSURE: 72 MMHG | WEIGHT: 197 LBS | RESPIRATION RATE: 18 BRPM | HEIGHT: 72 IN

## 2023-12-06 DIAGNOSIS — I10 ESSENTIAL HYPERTENSION: Primary | ICD-10-CM

## 2023-12-06 NOTE — PROGRESS NOTES
Subjective chief complaint is follow-up on blood pressure  Elia Walsh is a 63 y.o. male.     Hypertension  Pertinent negatives include no shortness of breath.    Godfrey is here today for follow-up on his blood pressure.  At his last visit we did add some amlodipine to his lisinopril and metoprolol.  Initially he felt lightheaded but he seems to have overcome that.  His blood pressure today to my exam was 130/68.  His blood pressures at home have improved fairly dramatically as well.    The following portions of the patient's history were reviewed and updated as appropriate: allergies, current medications, and problem list.    Review of Systems   Respiratory:  Negative for chest tightness and shortness of breath.    Cardiovascular:  Negative for leg swelling.       Objective   Physical Exam  Vitals and nursing note reviewed.   Constitutional:       Appearance: Normal appearance.   Cardiovascular:      Rate and Rhythm: Normal rate and regular rhythm.   Neurological:      Mental Status: He is alert.           Assessment & Plan   Diagnoses and all orders for this visit:    1. Essential hypertension (Primary)    Other orders  -     COVID-19 F23 (Pfizer) 12yrs+ (COMIRNATY)      Godfrey is here today for follow-up on his blood pressure.  It seems to be doing better to my exam and it is pressures at home have been better.  We are going to leave him on this regimen and see him back in 6 months           Answers submitted by the patient for this visit:  Primary Reason for Visit (Submitted on 11/29/2023)  What is the primary reason for your visit?: Other  Other (Submitted on 11/29/2023)  Please describe your symptoms.: Recheck  Have you had these symptoms before?: No  How long have you been having these symptoms?: Greater than 2 weeks  Please list any medications you are currently taking for this condition.: Amlodipine  Please describe any probable cause for these symptoms. : Bloodpressure

## 2024-01-03 ENCOUNTER — TELEPHONE (OUTPATIENT)
Dept: FAMILY MEDICINE CLINIC | Facility: CLINIC | Age: 64
End: 2024-01-03
Payer: MEDICARE

## 2024-01-03 RX ORDER — AMLODIPINE BESYLATE 5 MG/1
5 TABLET ORAL DAILY
Qty: 90 TABLET | Refills: 1 | Status: SHIPPED | OUTPATIENT
Start: 2024-01-03

## 2024-01-03 NOTE — TELEPHONE ENCOUNTER
Patient stopped by the practice and, patient stated that he is having issues with medication prescription stating that they all should be 90 day. And, that the [facility] pharmacy near the practice needs you to authorize this , can you help the patient resolve this issue.

## 2024-01-04 NOTE — TELEPHONE ENCOUNTER
Hub to relay     Attempted to call patient in order to relay message from provider however, they did not answer so I left a VM.

## 2024-01-12 DIAGNOSIS — K21.00 GASTROESOPHAGEAL REFLUX DISEASE WITH ESOPHAGITIS WITHOUT HEMORRHAGE: ICD-10-CM

## 2024-01-15 DIAGNOSIS — K21.00 GASTROESOPHAGEAL REFLUX DISEASE WITH ESOPHAGITIS WITHOUT HEMORRHAGE: ICD-10-CM

## 2024-01-15 RX ORDER — OMEPRAZOLE 40 MG/1
40 CAPSULE, DELAYED RELEASE ORAL EVERY OTHER DAY
Qty: 45 CAPSULE | Refills: 0 | Status: SHIPPED | OUTPATIENT
Start: 2024-01-15

## 2024-01-15 RX ORDER — OMEPRAZOLE 40 MG/1
40 CAPSULE, DELAYED RELEASE ORAL EVERY OTHER DAY
Qty: 45 CAPSULE | Refills: 0 | OUTPATIENT
Start: 2024-01-15

## 2024-01-16 RX ORDER — ICOSAPENT ETHYL 1000 MG/1
2 CAPSULE ORAL 2 TIMES DAILY WITH MEALS
Qty: 360 CAPSULE | Refills: 1 | Status: SHIPPED | OUTPATIENT
Start: 2024-01-16

## 2024-01-26 ENCOUNTER — OFFICE VISIT (OUTPATIENT)
Dept: CARDIOLOGY | Facility: CLINIC | Age: 64
End: 2024-01-26
Payer: MEDICARE

## 2024-01-26 VITALS
WEIGHT: 197 LBS | BODY MASS INDEX: 26.68 KG/M2 | DIASTOLIC BLOOD PRESSURE: 78 MMHG | SYSTOLIC BLOOD PRESSURE: 126 MMHG | HEIGHT: 72 IN | HEART RATE: 55 BPM

## 2024-01-26 DIAGNOSIS — G47.33 OSA (OBSTRUCTIVE SLEEP APNEA): ICD-10-CM

## 2024-01-26 DIAGNOSIS — I48.0 PAROXYSMAL ATRIAL FIBRILLATION: ICD-10-CM

## 2024-01-26 DIAGNOSIS — I10 ESSENTIAL HYPERTENSION: Primary | ICD-10-CM

## 2024-01-26 DIAGNOSIS — E78.2 MIXED HYPERLIPIDEMIA: ICD-10-CM

## 2024-01-26 DIAGNOSIS — I51.7 LVH (LEFT VENTRICULAR HYPERTROPHY): ICD-10-CM

## 2024-02-05 ENCOUNTER — HOSPITAL ENCOUNTER (OUTPATIENT)
Dept: CARDIOLOGY | Facility: HOSPITAL | Age: 64
Discharge: HOME | End: 2024-02-05
Admitting: INTERNAL MEDICINE
Payer: MEDICARE

## 2024-02-05 ENCOUNTER — TELEPHONE (OUTPATIENT)
Dept: CARDIOLOGY | Facility: CLINIC | Age: 64
End: 2024-02-05
Payer: MEDICARE

## 2024-02-05 VITALS
HEIGHT: 72 IN | HEART RATE: 59 BPM | OXYGEN SATURATION: 99 % | SYSTOLIC BLOOD PRESSURE: 140 MMHG | BODY MASS INDEX: 26.68 KG/M2 | DIASTOLIC BLOOD PRESSURE: 70 MMHG | WEIGHT: 197 LBS

## 2024-02-05 DIAGNOSIS — I51.7 LVH (LEFT VENTRICULAR HYPERTROPHY): ICD-10-CM

## 2024-02-05 DIAGNOSIS — I10 ESSENTIAL HYPERTENSION: ICD-10-CM

## 2024-02-05 LAB
AORTIC ARCH: 3.3 CM
AORTIC DIMENSIONLESS INDEX: 0.9 (DI)
ASCENDING AORTA: 3.4 CM
BH CV ECHO LEFT VENTRICLE GLOBAL LONGITUDINAL STRAIN: -22.6 %
BH CV ECHO MEAS - ACS: 2.26 CM
BH CV ECHO MEAS - AO MAX PG: 5.7 MMHG
BH CV ECHO MEAS - AO MEAN PG: 3 MMHG
BH CV ECHO MEAS - AO ROOT DIAM: 3.3 CM
BH CV ECHO MEAS - AO V2 MAX: 119 CM/SEC
BH CV ECHO MEAS - AO V2 VTI: 28.5 CM
BH CV ECHO MEAS - AVA(I,D): 2.6 CM2
BH CV ECHO MEAS - EDV(CUBED): 142.6 ML
BH CV ECHO MEAS - EDV(MOD-SP2): 88 ML
BH CV ECHO MEAS - EDV(MOD-SP4): 92 ML
BH CV ECHO MEAS - EF(MOD-BP): 64.6 %
BH CV ECHO MEAS - EF(MOD-SP2): 67 %
BH CV ECHO MEAS - EF(MOD-SP4): 65.2 %
BH CV ECHO MEAS - ESV(CUBED): 31.6 ML
BH CV ECHO MEAS - ESV(MOD-SP2): 29 ML
BH CV ECHO MEAS - ESV(MOD-SP4): 32 ML
BH CV ECHO MEAS - FS: 39.5 %
BH CV ECHO MEAS - IVS/LVPW: 1.04 CM
BH CV ECHO MEAS - IVSD: 1.04 CM
BH CV ECHO MEAS - LAT PEAK E' VEL: 7 CM/SEC
BH CV ECHO MEAS - LV DIASTOLIC VOL/BSA (35-75): 43.5 CM2
BH CV ECHO MEAS - LV MASS(C)D: 201.4 GRAMS
BH CV ECHO MEAS - LV MAX PG: 4 MMHG
BH CV ECHO MEAS - LV MEAN PG: 2 MMHG
BH CV ECHO MEAS - LV SYSTOLIC VOL/BSA (12-30): 15.1 CM2
BH CV ECHO MEAS - LV V1 MAX: 100 CM/SEC
BH CV ECHO MEAS - LV V1 VTI: 24.9 CM
BH CV ECHO MEAS - LVIDD: 5.2 CM
BH CV ECHO MEAS - LVIDS: 3.2 CM
BH CV ECHO MEAS - LVOT AREA: 3 CM2
BH CV ECHO MEAS - LVOT DIAM: 1.96 CM
BH CV ECHO MEAS - LVPWD: 1 CM
BH CV ECHO MEAS - MED PEAK E' VEL: 7.7 CM/SEC
BH CV ECHO MEAS - MV A DUR: 0.1 SEC
BH CV ECHO MEAS - MV A MAX VEL: 66.8 CM/SEC
BH CV ECHO MEAS - MV DEC SLOPE: 451 CM/SEC2
BH CV ECHO MEAS - MV DEC TIME: 0.21 SEC
BH CV ECHO MEAS - MV E MAX VEL: 98.4 CM/SEC
BH CV ECHO MEAS - MV E/A: 1.47
BH CV ECHO MEAS - MV MAX PG: 5.1 MMHG
BH CV ECHO MEAS - MV MEAN PG: 1.73 MMHG
BH CV ECHO MEAS - MV P1/2T: 73 MSEC
BH CV ECHO MEAS - MV V2 VTI: 40 CM
BH CV ECHO MEAS - MVA(P1/2T): 3 CM2
BH CV ECHO MEAS - MVA(VTI): 1.87 CM2
BH CV ECHO MEAS - PA ACC TIME: 0.1 SEC
BH CV ECHO MEAS - PA V2 MAX: 104.5 CM/SEC
BH CV ECHO MEAS - PULM A REVS DUR: 0.1 SEC
BH CV ECHO MEAS - PULM A REVS VEL: 36.7 CM/SEC
BH CV ECHO MEAS - PULM DIAS VEL: 60.9 CM/SEC
BH CV ECHO MEAS - PULM S/D: 0.77
BH CV ECHO MEAS - PULM SYS VEL: 46.7 CM/SEC
BH CV ECHO MEAS - QP/QS: 0.93
BH CV ECHO MEAS - RAP SYSTOLE: 3 MMHG
BH CV ECHO MEAS - RV MAX PG: 2.9 MMHG
BH CV ECHO MEAS - RV V1 MAX: 85.7 CM/SEC
BH CV ECHO MEAS - RV V1 VTI: 20.6 CM
BH CV ECHO MEAS - RVOT DIAM: 2.08 CM
BH CV ECHO MEAS - RVSP: 17.4 MMHG
BH CV ECHO MEAS - SI(MOD-SP2): 27.9 ML/M2
BH CV ECHO MEAS - SI(MOD-SP4): 28.3 ML/M2
BH CV ECHO MEAS - SUP REN AO DIAM: 2.4 CM
BH CV ECHO MEAS - SV(LVOT): 74.9 ML
BH CV ECHO MEAS - SV(MOD-SP2): 59 ML
BH CV ECHO MEAS - SV(MOD-SP4): 60 ML
BH CV ECHO MEAS - SV(RVOT): 69.5 ML
BH CV ECHO MEAS - TAPSE (>1.6): 2.4 CM
BH CV ECHO MEAS - TR MAX PG: 14.4 MMHG
BH CV ECHO MEAS - TR MAX VEL: 190 CM/SEC
BH CV ECHO MEASUREMENTS AVERAGE E/E' RATIO: 13.39
BH CV VAS BP LEFT ARM: NORMAL MMHG
BH CV XLRA - RV BASE: 3.4 CM
BH CV XLRA - RV LENGTH: 7.5 CM
BH CV XLRA - RV MID: 3.5 CM
BH CV XLRA - TDI S': 12.9 CM/SEC
LEFT ATRIUM VOLUME INDEX: 31.1 ML/M2
SINUS: 3.2 CM
STJ: 3 CM

## 2024-02-05 PROCEDURE — 93306 TTE W/DOPPLER COMPLETE: CPT | Performed by: INTERNAL MEDICINE

## 2024-02-05 PROCEDURE — 93356 MYOCRD STRAIN IMG SPCKL TRCK: CPT | Performed by: INTERNAL MEDICINE

## 2024-02-05 PROCEDURE — 93356 MYOCRD STRAIN IMG SPCKL TRCK: CPT

## 2024-02-05 PROCEDURE — 93306 TTE W/DOPPLER COMPLETE: CPT

## 2024-02-05 NOTE — TELEPHONE ENCOUNTER
Please let him know that echo looks good.  His heart is strong and functioning well.  Would continue current medications and call with questions or concerns

## 2024-02-06 NOTE — TELEPHONE ENCOUNTER
I tried to call Elia Walsh but there was no answer.  Left a voicemail asking patient to call back.  Will continue to try to reach pt.    HUB- if pt calls back, please transfer through to triage.    Thank you,    Cathleen RODRÍGUEZ, RN  Triage [facility]  02/06/24 09:09 EST

## 2024-02-06 NOTE — TELEPHONE ENCOUNTER
Notified patient of results/recommendations. Patient verbalized understanding.    Dona Lr RN  Triage [facility]

## 2024-02-16 RX ORDER — ICOSAPENT ETHYL 1000 MG/1
2 CAPSULE ORAL 2 TIMES DAILY WITH MEALS
Qty: 360 CAPSULE | Refills: 1 | Status: SHIPPED | OUTPATIENT
Start: 2024-02-16

## 2024-02-19 RX ORDER — ROSUVASTATIN CALCIUM 20 MG/1
20 TABLET, COATED ORAL DAILY
Qty: 90 TABLET | Refills: 1 | Status: SHIPPED | OUTPATIENT
Start: 2024-02-19

## 2024-03-18 RX ORDER — LISINOPRIL 40 MG/1
40 TABLET ORAL DAILY
Qty: 90 TABLET | Refills: 1 | Status: SHIPPED | OUTPATIENT
Start: 2024-03-18

## 2024-03-24 NOTE — PATIENT INSTRUCTIONS
"DASH Eating Plan  DASH stands for \"Dietary Approaches to Stop Hypertension.\" The DASH eating plan is a healthy eating plan that has been shown to reduce high blood pressure (hypertension). It may also reduce your risk for type 2 diabetes, heart disease, and stroke. The DASH eating plan may also help with weight loss.  What are tips for following this plan?    General guidelines  · Avoid eating more than 2,300 mg (milligrams) of salt (sodium) a day. If you have hypertension, you may need to reduce your sodium intake to 1,500 mg a day.  · Limit alcohol intake to no more than 1 drink a day for nonpregnant women and 2 drinks a day for men. One drink equals 12 oz of beer, 5 oz of wine, or 1½ oz of hard liquor.  · Work with your health care provider to maintain a healthy body weight or to lose weight. Ask what an ideal weight is for you.  · Get at least 30 minutes of exercise that causes your heart to beat faster (aerobic exercise) most days of the week. Activities may include walking, swimming, or biking.  · Work with your health care provider or diet and nutrition specialist (dietitian) to adjust your eating plan to your individual calorie needs.  Reading food labels    · Check food labels for the amount of sodium per serving. Choose foods with less than 5 percent of the Daily Value of sodium. Generally, foods with less than 300 mg of sodium per serving fit into this eating plan.  · To find whole grains, look for the word \"whole\" as the first word in the ingredient list.  Shopping  · Buy products labeled as \"low-sodium\" or \"no salt added.\"  · Buy fresh foods. Avoid canned foods and premade or frozen meals.  Cooking  · Avoid adding salt when cooking. Use salt-free seasonings or herbs instead of table salt or sea salt. Check with your health care provider or pharmacist before using salt substitutes.  · Do not collins foods. Cook foods using healthy methods such as baking, boiling, grilling, and broiling instead.  · Cook with " E- visit link sent    heart-healthy oils, such as olive, canola, soybean, or sunflower oil.  Meal planning  · Eat a balanced diet that includes:  ? 5 or more servings of fruits and vegetables each day. At each meal, try to fill half of your plate with fruits and vegetables.  ? Up to 6-8 servings of whole grains each day.  ? Less than 6 oz of lean meat, poultry, or fish each day. A 3-oz serving of meat is about the same size as a deck of cards. One egg equals 1 oz.  ? 2 servings of low-fat dairy each day.  ? A serving of nuts, seeds, or beans 5 times each week.  ? Heart-healthy fats. Healthy fats called Omega-3 fatty acids are found in foods such as flaxseeds and coldwater fish, like sardines, salmon, and mackerel.  · Limit how much you eat of the following:  ? Canned or prepackaged foods.  ? Food that is high in trans fat, such as fried foods.  ? Food that is high in saturated fat, such as fatty meat.  ? Sweets, desserts, sugary drinks, and other foods with added sugar.  ? Full-fat dairy products.  · Do not salt foods before eating.  · Try to eat at least 2 vegetarian meals each week.  · Eat more home-cooked food and less restaurant, buffet, and fast food.  · When eating at a restaurant, ask that your food be prepared with less salt or no salt, if possible.  What foods are recommended?  The items listed may not be a complete list. Talk with your dietitian about what dietary choices are best for you.  Grains  Whole-grain or whole-wheat bread. Whole-grain or whole-wheat pasta. Brown rice. Oatmeal. Quinoa. Bulgur. Whole-grain and low-sodium cereals. Stefanie bread. Low-fat, low-sodium crackers. Whole-wheat flour tortillas.  Vegetables  Fresh or frozen vegetables (raw, steamed, roasted, or grilled). Low-sodium or reduced-sodium tomato and vegetable juice. Low-sodium or reduced-sodium tomato sauce and tomato paste. Low-sodium or reduced-sodium canned vegetables.  Fruits  All fresh, dried, or frozen fruit. Canned fruit in natural juice (without  added sugar).  Meat and other protein foods  Skinless chicken or turkey. Ground chicken or turkey. Pork with fat trimmed off. Fish and seafood. Egg whites. Dried beans, peas, or lentils. Unsalted nuts, nut butters, and seeds. Unsalted canned beans. Lean cuts of beef with fat trimmed off. Low-sodium, lean deli meat.  Dairy  Low-fat (1%) or fat-free (skim) milk. Fat-free, low-fat, or reduced-fat cheeses. Nonfat, low-sodium ricotta or cottage cheese. Low-fat or nonfat yogurt. Low-fat, low-sodium cheese.  Fats and oils  Soft margarine without trans fats. Vegetable oil. Low-fat, reduced-fat, or light mayonnaise and salad dressings (reduced-sodium). Canola, safflower, olive, soybean, and sunflower oils. Avocado.  Seasoning and other foods  Herbs. Spices. Seasoning mixes without salt. Unsalted popcorn and pretzels. Fat-free sweets.  What foods are not recommended?  The items listed may not be a complete list. Talk with your dietitian about what dietary choices are best for you.  Grains  Baked goods made with fat, such as croissants, muffins, or some breads. Dry pasta or rice meal packs.  Vegetables  Creamed or fried vegetables. Vegetables in a cheese sauce. Regular canned vegetables (not low-sodium or reduced-sodium). Regular canned tomato sauce and paste (not low-sodium or reduced-sodium). Regular tomato and vegetable juice (not low-sodium or reduced-sodium). Pickles. Olives.  Fruits  Canned fruit in a light or heavy syrup. Fried fruit. Fruit in cream or butter sauce.  Meat and other protein foods  Fatty cuts of meat. Ribs. Fried meat. [place]. Sausage. Bologna and other processed lunch meats. Salami. Fatback. Hotdogs. Bratwurst. Salted nuts and seeds. Canned beans with added salt. Canned or smoked fish. Whole eggs or egg yolks. Chicken or turkey with skin.  Dairy  Whole or 2% milk, cream, and half-and-half. Whole or full-fat cream cheese. Whole-fat or sweetened yogurt. Full-fat cheese. Nondairy creamers. Whipped toppings.  Processed cheese and cheese spreads.  Fats and oils  Butter. Stick margarine. Lard. Shortening. Ghee. [place] fat. Tropical oils, such as coconut, palm kernel, or palm oil.  Seasoning and other foods  Salted popcorn and pretzels. Onion salt, garlic salt, seasoned salt, table salt, and sea salt. Worcestershire sauce. Tartar sauce. Barbecue sauce. Teriyaki sauce. Soy sauce, including reduced-sodium. Steak sauce. Canned and packaged gravies. Fish sauce. Oyster sauce. Cocktail sauce. Horseradish that you find on the shelf. Ketchup. Mustard. Meat flavorings and tenderizers. Bouillon cubes. Hot sauce and Tabasco sauce. Premade or packaged marinades. Premade or packaged taco seasonings. Relishes. Regular salad dressings.  Where to find more information:  · National Heart, Lung, and Blood [place]: www.nhlbi.nih.gov  · American Heart Association: www.heart.org  Summary  · The DASH eating plan is a healthy eating plan that has been shown to reduce high blood pressure (hypertension). It may also reduce your risk for type 2 diabetes, heart disease, and stroke.  · With the DASH eating plan, you should limit salt (sodium) intake to 2,300 mg a day. If you have hypertension, you may need to reduce your sodium intake to 1,500 mg a day.  · When on the DASH eating plan, aim to eat more fresh fruits and vegetables, whole grains, lean proteins, low-fat dairy, and heart-healthy fats.  · Work with your health care provider or diet and nutrition specialist (dietitian) to adjust your eating plan to your individual calorie needs.  This information is not intended to replace advice given to you by your health care provider. Make sure you discuss any questions you have with your health care provider.  Document Revised: 11/30/2018 Document Reviewed: 12/11/2017  Elsevier Patient Education © 2020 [employer] Inc.

## 2024-04-12 DIAGNOSIS — K21.00 GASTROESOPHAGEAL REFLUX DISEASE WITH ESOPHAGITIS WITHOUT HEMORRHAGE: ICD-10-CM

## 2024-04-12 RX ORDER — OMEPRAZOLE 40 MG/1
40 CAPSULE, DELAYED RELEASE ORAL EVERY OTHER DAY
Qty: 45 CAPSULE | Refills: 0 | Status: SHIPPED | OUTPATIENT
Start: 2024-04-12

## 2024-05-28 NOTE — TELEPHONE ENCOUNTER
Last scope 03/08/2018 in [facility]-- personal hx of polyps-- no family hx of polyps or colon ca-- Eliquis-- medications:    apixaban (ELIQUIS) 5 MG tablet tablet      cyclobenzaprine (FLEXERIL) 10 MG tablet     finasteride (PROSCAR) 5 MG tablet     latanoprost (XALATAN) 0.005 % ophthalmic solution     lisinopril (PRINIVIL,ZESTRIL) 20 MG tablet     lisinopril (PRINIVIL,ZESTRIL) 20 MG tablet     metoprolol tartrate (LOPRESSOR) 25 MG tablet     Multiple Vitamins-Minerals (MULTIVITAL PO)     omeprazole (priLOSEC) 40 MG capsule     PROCTOFOAM HC 1-1 % rectal foam     rosuvastatin (CRESTOR) 20 MG tablet     tamsulosin (FLOMAX) 0.4 MG capsule 24 hr capsule     timolol (TIMOPTIC) 0.5 % ophthalmic solution     Vascepa 1 g capsule capsule      OA form scanned into media      
show

## 2024-06-07 ENCOUNTER — OFFICE VISIT (OUTPATIENT)
Dept: FAMILY MEDICINE CLINIC | Facility: CLINIC | Age: 64
End: 2024-06-07
Payer: MEDICARE

## 2024-06-07 VITALS
DIASTOLIC BLOOD PRESSURE: 78 MMHG | HEIGHT: 72 IN | OXYGEN SATURATION: 98 % | RESPIRATION RATE: 18 BRPM | WEIGHT: 195 LBS | BODY MASS INDEX: 26.41 KG/M2 | HEART RATE: 58 BPM | SYSTOLIC BLOOD PRESSURE: 116 MMHG

## 2024-06-07 DIAGNOSIS — Z13.6 ENCOUNTER FOR SCREENING FOR ABDOMINAL AORTIC ANEURYSM (AAA) IN PATIENT 50 YEARS OF AGE OR OLDER WITH HISTORY OF SMOKING: ICD-10-CM

## 2024-06-07 DIAGNOSIS — Z00.00 ENCOUNTER FOR INITIAL ANNUAL WELLNESS VISIT (AWV) IN MEDICARE PATIENT: Primary | ICD-10-CM

## 2024-06-07 DIAGNOSIS — R73.09 IMPAIRED GLUCOSE METABOLISM: ICD-10-CM

## 2024-06-07 DIAGNOSIS — Z87.891 ENCOUNTER FOR SCREENING FOR ABDOMINAL AORTIC ANEURYSM (AAA) IN PATIENT 50 YEARS OF AGE OR OLDER WITH HISTORY OF SMOKING: ICD-10-CM

## 2024-06-07 DIAGNOSIS — E78.2 MIXED HYPERLIPIDEMIA: ICD-10-CM

## 2024-06-07 LAB
ALBUMIN SERPL-MCNC: 4.7 G/DL (ref 3.5–5.2)
ALBUMIN/GLOB SERPL: 1.9 G/DL
ALP SERPL-CCNC: 75 U/L (ref 39–117)
ALT SERPL-CCNC: 30 U/L (ref 1–41)
AST SERPL-CCNC: 25 U/L (ref 1–40)
BASOPHILS # BLD AUTO: 0.09 10*3/MM3 (ref 0–0.2)
BASOPHILS NFR BLD AUTO: 1.3 % (ref 0–1.5)
BILIRUB SERPL-MCNC: 0.7 MG/DL (ref 0–1.2)
BUN SERPL-MCNC: 18 MG/DL (ref 8–23)
BUN/CREAT SERPL: 20.7 (ref 7–25)
CALCIUM SERPL-MCNC: 9.6 MG/DL (ref 8.6–10.5)
CHLORIDE SERPL-SCNC: 102 MMOL/L (ref 98–107)
CHOLEST SERPL-MCNC: 139 MG/DL (ref 0–200)
CO2 SERPL-SCNC: 25.7 MMOL/L (ref 22–29)
CREAT SERPL-MCNC: 0.87 MG/DL (ref 0.76–1.27)
EGFRCR SERPLBLD CKD-EPI 2021: 95.2 ML/MIN/1.73
EOSINOPHIL # BLD AUTO: 0.19 10*3/MM3 (ref 0–0.4)
EOSINOPHIL NFR BLD AUTO: 2.7 % (ref 0.3–6.2)
ERYTHROCYTE [DISTWIDTH] IN BLOOD BY AUTOMATED COUNT: 13.4 % (ref 12.3–15.4)
GLOBULIN SER CALC-MCNC: 2.5 GM/DL
GLUCOSE SERPL-MCNC: 112 MG/DL (ref 65–99)
HBA1C MFR BLD: 5.7 % (ref 4.8–5.6)
HCT VFR BLD AUTO: 45.2 % (ref 37.5–51)
HDLC SERPL-MCNC: 42 MG/DL (ref 40–60)
HGB BLD-MCNC: 15.5 G/DL (ref 13–17.7)
IMM GRANULOCYTES # BLD AUTO: 0.01 10*3/MM3 (ref 0–0.05)
IMM GRANULOCYTES NFR BLD AUTO: 0.1 % (ref 0–0.5)
LDLC SERPL CALC-MCNC: 77 MG/DL (ref 0–100)
LYMPHOCYTES # BLD AUTO: 1.96 10*3/MM3 (ref 0.7–3.1)
LYMPHOCYTES NFR BLD AUTO: 27.9 % (ref 19.6–45.3)
MCH RBC QN AUTO: 33.1 PG (ref 26.6–33)
MCHC RBC AUTO-ENTMCNC: 34.3 G/DL (ref 31.5–35.7)
MCV RBC AUTO: 96.6 FL (ref 79–97)
MONOCYTES # BLD AUTO: 0.66 10*3/MM3 (ref 0.1–0.9)
MONOCYTES NFR BLD AUTO: 9.4 % (ref 5–12)
NEUTROPHILS # BLD AUTO: 4.12 10*3/MM3 (ref 1.7–7)
NEUTROPHILS NFR BLD AUTO: 58.6 % (ref 42.7–76)
NRBC BLD AUTO-RTO: 0 /100 WBC (ref 0–0.2)
PLATELET # BLD AUTO: 215 10*3/MM3 (ref 140–450)
POTASSIUM SERPL-SCNC: 4.6 MMOL/L (ref 3.5–5.2)
PROT SERPL-MCNC: 7.2 G/DL (ref 6–8.5)
RBC # BLD AUTO: 4.68 10*6/MM3 (ref 4.14–5.8)
SODIUM SERPL-SCNC: 138 MMOL/L (ref 136–145)
TRIGL SERPL-MCNC: 109 MG/DL (ref 0–150)
VLDLC SERPL CALC-MCNC: 20 MG/DL (ref 5–40)
WBC # BLD AUTO: 7.03 10*3/MM3 (ref 3.4–10.8)

## 2024-06-07 RX ORDER — AMLODIPINE BESYLATE 5 MG/1
5 TABLET ORAL DAILY
Qty: 90 TABLET | Refills: 1 | Status: SHIPPED | OUTPATIENT
Start: 2024-06-07

## 2024-06-07 NOTE — PATIENT INSTRUCTIONS
Medicare Wellness  Personal Prevention Plan of Service     Date of Office Visit:    Encounter Provider:  Vinh Rooney MD  Place of Service:  [facility]  Patient Name: Elia Walsh  :  1960    As part of the Medicare Wellness portion of your visit today, we are providing you with this personalized preventive plan of services (PPPS). This plan is based upon recommendations of the [place] Preventive Services Task Force (USPSTF) and the Advisory Committee on Immunization Practices (ACIP).    This lists the preventive care services that should be considered, and provides dates of when you are due. Items listed as completed are up-to-date and do not require any further intervention.    Health Maintenance   Topic Date Due    ZOSTER VACCINE (1 of 2) Never done    RSV Vaccine - Adults (1 - 1-dose 60+ series) Never done    Pneumococcal Vaccine 65+ (1 of 1 - PCV) Never done    AAA SCREEN (ONE-TIME)  Never done    COVID-19 Vaccine (2023- season) 2024    INFLUENZA VACCINE  2024    LIPID PANEL  2024    ANNUAL WELLNESS VISIT  2025    BMI FOLLOWUP  2025    COLORECTAL CANCER SCREENING  2026    TDAP/TD VACCINES (2 - Td or Tdap) 2027    HEPATITIS C SCREENING  Completed       Orders Placed This Encounter   Procedures    US AAA Screen Limited     Standing Status:   Future     Standing Expiration Date:   2025     Order Specific Question:   Is the patient a male between 65-75 years old and have smoked at least 100 cigarettes in their lifetime OR a male or female Medicare patient who has a family history of abdominal aoritc aneurysm?     Answer:   Yes     Order Specific Question:   Reason for Exam:     Answer:   screening     Order Specific Question:   Release to patient     Answer:   Routine Release [[id number]]    Pneumococcal Conjugate Vaccine 20-Valent All    Comprehensive Metabolic Panel     Order Specific Question:    Release to patient     Answer:   Routine Release [[id number]]    Hemoglobin A1c     Order Specific Question:   Release to patient     Answer:   Routine Release [[id number]]    Lipid Panel     Order Specific Question:   Release to patient     Answer:   Routine Release [[id number]]    CBC & Differential     Order Specific Question:   Manual Differential     Answer:   No     Order Specific Question:   Release to patient     Answer:   Routine Release [[id number]]       No follow-ups on file.

## 2024-06-07 NOTE — PROGRESS NOTES
The ABCs of the Annual Wellness Visit  Subsequent Medicare Wellness Visit    Subjective      Elia Walsh is a 64 y.o. male who presents for a Subsequent Medicare Wellness Visit.    The following portions of the patient's history were reviewed and   updated as appropriate: He  has a past medical history of Allergic, Anticoagulated, BPH (benign prostatic hyperplasia), Cataract, DJD (degenerative joint disease), Essential hypertension, benign, GERD (gastroesophageal reflux disease), Glaucoma, Grief reaction, Impaired glucose metabolism (2019), Marijuana use, continuous, Mixed hyperlipidemia, POLY (obstructive sleep apnea) (2020), PAF (paroxysmal atrial fibrillation), and Rectal bleeding.  He does not have any pertinent problems on file.  He  has a past surgical history that includes Hernia repair; Hemorrhoid surgery; Esophagogastroduodenoscopy (05/28/2015); Esophagogastroduodenoscopy (09/29/2006); Colonoscopy; Colonoscopy (04/16/2013); and Colonoscopy (09/29/2006).  His family history includes Aneurysm (age of onset: 70) in his father; Hypertension (age of onset: 54) in his mother.  He  reports that he quit smoking about 31 years ago. His smoking use included cigarettes. He started smoking about 56 years ago. He has a 25 pack-year smoking history. He has never used smokeless tobacco. He reports current alcohol use of about 4.0 - 6.0 standard drinks of alcohol per week. He reports current drug use. Drug: Marijuana.  Current Outpatient Medications   Medication Sig Dispense Refill    amLODIPine (NORVASC) 5 MG tablet Take 1 tablet by mouth Daily. 90 tablet 1    apixaban (Eliquis) 5 MG tablet tablet Take 1 tablet by mouth Every 12 (Twelve) Hours. 180 tablet 1    finasteride (PROSCAR) 5 MG tablet Take 1 tablet by mouth Daily.      Hydrocort-Pramoxine, Perianal, (Proctofoam HC) 1-1 % rectal foam Insert 1 application into the rectum 2 (Two) Times a Day. 10 g 2    icosapent ethyl (VASCEPA) 1 g capsule capsule Take 2 g by  mouth 2 (Two) Times a Day With Meals. 360 capsule 1    lisinopril (PRINIVIL,ZESTRIL) 40 MG tablet TAKE ONE TABLET BY MOUTH DAILY 90 tablet 1    metoprolol tartrate (LOPRESSOR) 25 MG tablet Take 1 tablet by mouth 2 (Two) Times a Day. 180 tablet 1    Multiple Vitamins-Minerals (MULTIVITAL PO) Take  by mouth Daily.      omeprazole (priLOSEC) 40 MG capsule TAKE 1 CAPSULE BY MOUTH EVERY OTHER DAY 45 capsule 0    Rocklatan 0.02-0.005 % solution Every Night.      rosuvastatin (CRESTOR) 20 MG tablet Take 1 tablet by mouth Daily. 90 tablet 1    tamsulosin (FLOMAX) 0.4 MG capsule 24 hr capsule 1 capsule Every Other Day.      timolol (TIMOPTIC) 0.5 % ophthalmic solution Administer 1 drop to both eyes 2 (Two) Times a Day.       No current facility-administered medications for this visit.     Current Outpatient Medications on File Prior to Visit   Medication Sig    amLODIPine (NORVASC) 5 MG tablet Take 1 tablet by mouth Daily.    apixaban (Eliquis) 5 MG tablet tablet Take 1 tablet by mouth Every 12 (Twelve) Hours.    finasteride (PROSCAR) 5 MG tablet Take 1 tablet by mouth Daily.    Hydrocort-Pramoxine, Perianal, (Proctofoam HC) 1-1 % rectal foam Insert 1 application into the rectum 2 (Two) Times a Day.    icosapent ethyl (VASCEPA) 1 g capsule capsule Take 2 g by mouth 2 (Two) Times a Day With Meals.    lisinopril (PRINIVIL,ZESTRIL) 40 MG tablet TAKE ONE TABLET BY MOUTH DAILY    metoprolol tartrate (LOPRESSOR) 25 MG tablet Take 1 tablet by mouth 2 (Two) Times a Day.    Multiple Vitamins-Minerals (MULTIVITAL PO) Take  by mouth Daily.    omeprazole (priLOSEC) 40 MG capsule TAKE 1 CAPSULE BY MOUTH EVERY OTHER DAY    Rocklatan 0.02-0.005 % solution Every Night.    rosuvastatin (CRESTOR) 20 MG tablet Take 1 tablet by mouth Daily.    tamsulosin (FLOMAX) 0.4 MG capsule 24 hr capsule 1 capsule Every Other Day.    timolol (TIMOPTIC) 0.5 % ophthalmic solution Administer 1 drop to both eyes 2 (Two) Times a Day.     No current  facility-administered medications on file prior to visit.     He has No Known Allergies..    Compared to one year ago, the patient feels his physical   health is the same.    Compared to one year ago, the patient feels his mental   health is the same.    Recent Hospitalizations:  He was not admitted to the hospital during the last year.       Current Medical Providers:  Patient Care Team:  Vinh Rooney MD as PCP - General (Internal Medicine)  Kat Fischer MD as Consulting Physician (Cardiology)  Al Haddad MD as Consulting Physician (Gastroenterology)    Outpatient Medications Prior to Visit   Medication Sig Dispense Refill    amLODIPine (NORVASC) 5 MG tablet Take 1 tablet by mouth Daily. 90 tablet 1    apixaban (Eliquis) 5 MG tablet tablet Take 1 tablet by mouth Every 12 (Twelve) Hours. 180 tablet 1    finasteride (PROSCAR) 5 MG tablet Take 1 tablet by mouth Daily.      Hydrocort-Pramoxine, Perianal, (Proctofoam HC) 1-1 % rectal foam Insert 1 application into the rectum 2 (Two) Times a Day. 10 g 2    icosapent ethyl (VASCEPA) 1 g capsule capsule Take 2 g by mouth 2 (Two) Times a Day With Meals. 360 capsule 1    lisinopril (PRINIVIL,ZESTRIL) 40 MG tablet TAKE ONE TABLET BY MOUTH DAILY 90 tablet 1    metoprolol tartrate (LOPRESSOR) 25 MG tablet Take 1 tablet by mouth 2 (Two) Times a Day. 180 tablet 1    Multiple Vitamins-Minerals (MULTIVITAL PO) Take  by mouth Daily.      omeprazole (priLOSEC) 40 MG capsule TAKE 1 CAPSULE BY MOUTH EVERY OTHER DAY 45 capsule 0    Rocklatan 0.02-0.005 % solution Every Night.      rosuvastatin (CRESTOR) 20 MG tablet Take 1 tablet by mouth Daily. 90 tablet 1    tamsulosin (FLOMAX) 0.4 MG capsule 24 hr capsule 1 capsule Every Other Day.      timolol (TIMOPTIC) 0.5 % ophthalmic solution Administer 1 drop to both eyes 2 (Two) Times a Day.       No facility-administered medications prior to visit.       No opioid medication identified on active medication list. I have  "reviewed chart for other potential  high risk medication/s and harmful drug interactions in the elderly.        Aspirin is not on active medication list.  Aspirin use is not indicated based on review of current medical condition/s. Risk of harm outweighs potential benefits.  .    Patient Active Problem List   Diagnosis    Essential hypertension    Mixed hyperlipidemia    Gastroesophageal reflux disease with esophagitis    Tubular adenoma of colon    Perianal venous thrombosis    Benign prostatic hyperplasia with lower urinary tract symptoms    Paroxysmal atrial fibrillation    POLY (obstructive sleep apnea)    Glaucoma    Chronic anticoagulation    Impaired glucose metabolism     Advance Care Planning   Advance Care Planning     Advance Directive is not on file.  ACP discussion was held with the patient during this visit. Patient does not have an advance directive, information provided.     Objective    Vitals:    06/07/24 1014   BP: 134/82   Pulse: 58   Resp: 18   SpO2: 98%   Weight: 88.5 kg (195 lb)   Height: 182.9 cm (72.01\")     Estimated body mass index is 26.44 kg/m² as calculated from the following:    Height as of this encounter: 182.9 cm (72.01\").    Weight as of this encounter: 88.5 kg (195 lb).    BMI is >= 25 and <30. (Overweight) The following options were offered after discussion;: weight loss educational material (shared in after visit summary) and exercise counseling/recommendations  Physical Exam  Vitals and nursing note reviewed.   Constitutional:       Appearance: Normal appearance.   Neck:      Vascular: No carotid bruit.   Cardiovascular:      Rate and Rhythm: Normal rate and regular rhythm.      Comments: Appears to be in a regular rhythm today.  Pulmonary:      Effort: Pulmonary effort is normal.      Breath sounds: No wheezing, rhonchi or rales.   Abdominal:      General: Bowel sounds are normal.      Palpations: Abdomen is soft.      Tenderness: There is no abdominal tenderness. There is no " guarding or rebound.   Musculoskeletal:      Right lower leg: No edema.      Left lower leg: No edema.   Neurological:      Mental Status: He is alert.         Does the patient have evidence of cognitive impairment?   No            HEALTH RISK ASSESSMENT    Smoking Status:  Social History     Tobacco Use   Smoking Status Former    Current packs/day: 0.00    Average packs/day: 1 pack/day for 25.0 years (25.0 ttl pk-yrs)    Types: Cigarettes    Start date: 1968    Quit date: 1993    Years since quittin.0   Smokeless Tobacco Never   Tobacco Comments    35 years ago     Alcohol Consumption:  Social History     Substance and Sexual Activity   Alcohol Use Yes    Alcohol/week: 4.0 - 6.0 standard drinks of alcohol    Types: 4 - 6 Cans of beer per week    Comment: socially/4 or 5/week; Daily caffeine use     Fall Risk Screen:    JEFF Fall Risk Assessment was completed, and patient is at LOW risk for falls.Assessment completed on:2024    Depression Screenin/7/2024    10:00 AM   PHQ-2/PHQ-9 Depression Screening   Little Interest or Pleasure in Doing Things 0-->not at all   Feeling Down, Depressed or Hopeless 0-->not at all   PHQ-9: Brief Depression Severity Measure Score 0       Health Habits and Functional and Cognitive Screenin/31/2024     9:12 AM   Functional & Cognitive Status   Do you have difficulty preparing food and eating? No   Do you have difficulty bathing yourself, getting dressed or grooming yourself? No   Do you have difficulty using the toilet? No   Do you have difficulty moving around from place to place? No   Do you have trouble with steps or getting out of a bed or a chair? No   Current Diet Limited Junk Food   Dental Exam Up to date   Eye Exam Up to date   Exercise (times per week) 1 times per week   Current Exercises Include No Regular Exercise   Do you need help using the phone?  No   Are you deaf or do you have serious difficulty hearing?  No   Do you need help to go  to places out of walking distance? No   Do you need help shopping? No   Do you need help preparing meals?  No   Do you need help with housework?  No   Do you need help with laundry? No   Do you need help taking your medications? No   Do you need help managing money? No   Do you ever drive or ride in a car without wearing a seat belt? No   Have you felt unusual stress, anger or loneliness in the last month? No   Who do you live with? Alone   If you need help, do you have trouble finding someone available to you? No   Have you been bothered in the last four weeks by sexual problems? No   Do you have difficulty concentrating, remembering or making decisions? No       Age-appropriate Screening Schedule:  Refer to the list below for future screening recommendations based on patient's age, sex and/or medical conditions. Orders for these recommended tests are listed in the plan section. The patient has been provided with a written plan.    Health Maintenance   Topic Date Due    BMI FOLLOWUP  Never done    ZOSTER VACCINE (1 of 2) Never done    ANNUAL WELLNESS VISIT  Never done    RSV Vaccine - Adults (1 - 1-dose 60+ series) Never done    Pneumococcal Vaccine 65+ (1 of 1 - PCV) Never done    AAA SCREEN (ONE-TIME)  Never done    COVID-19 Vaccine (7 - 2023-24 season) 04/07/2024    INFLUENZA VACCINE  08/01/2024    LIPID PANEL  11/08/2024    COLORECTAL CANCER SCREENING  03/03/2026    TDAP/TD VACCINES (2 - Td or Tdap) 08/03/2027    HEPATITIS C SCREENING  Completed                  CMS Preventative Services Quick Reference  Risk Factors Identified During Encounter:    Immunizations Discussed/Encouraged: Prevnar 20 (Pneumococcal 20-valent conjugate), Shingrix, and RSV (Respiratory Syncytial Virus)    The above risks/problems have been discussed with the patient.  Pertinent information has been shared with the patient in the After Visit Summary.    Diagnoses and all orders for this visit:    1. Encounter for initial annual wellness  visit (AWV) in Medicare patient (Primary)    2. Mixed hyperlipidemia  -     CBC & Differential  -     Lipid Panel    3. Impaired glucose metabolism  -     CBC & Differential  -     Comprehensive Metabolic Panel  -     Hemoglobin A1c    4. Encounter for screening for abdominal aortic aneurysm (AAA) in patient 50 years of age or older with history of smoking  -     US AAA Screen Limited; Future    Other orders  -     amLODIPine (NORVASC) 5 MG tablet; Take 1 tablet by mouth Daily.  Dispense: 90 tablet; Refill: 1  -     Pneumococcal Conjugate Vaccine 20-Valent Amandeep Donahue is here today for his wellness visit.  We are going to get him set up for a screening abdominal aortic aneurysm.  He is going to receive the Prevnar vaccine today.  He is due to have some lab work and we will accommodate him on that.    Follow Up:   Next Medicare Wellness visit to be scheduled in 1 year.      An After Visit Summary and PPPS were made available to the patient.

## 2024-07-08 DIAGNOSIS — K21.00 GASTROESOPHAGEAL REFLUX DISEASE WITH ESOPHAGITIS WITHOUT HEMORRHAGE: ICD-10-CM

## 2024-07-08 RX ORDER — OMEPRAZOLE 40 MG/1
40 CAPSULE, DELAYED RELEASE ORAL EVERY OTHER DAY
Qty: 45 CAPSULE | Refills: 0 | Status: SHIPPED | OUTPATIENT
Start: 2024-07-08

## 2024-07-15 ENCOUNTER — HOSPITAL ENCOUNTER (OUTPATIENT)
Dept: ULTRASOUND IMAGING | Facility: HOSPITAL | Age: 64
Discharge: HOME | End: 2024-07-15
Admitting: INTERNAL MEDICINE
Payer: MEDICARE

## 2024-07-15 DIAGNOSIS — Z13.6 ENCOUNTER FOR SCREENING FOR ABDOMINAL AORTIC ANEURYSM (AAA) IN PATIENT 50 YEARS OF AGE OR OLDER WITH HISTORY OF SMOKING: ICD-10-CM

## 2024-07-15 DIAGNOSIS — Z87.891 ENCOUNTER FOR SCREENING FOR ABDOMINAL AORTIC ANEURYSM (AAA) IN PATIENT 50 YEARS OF AGE OR OLDER WITH HISTORY OF SMOKING: ICD-10-CM

## 2024-07-15 PROCEDURE — 76706 US ABDL AORTA SCREEN AAA: CPT

## 2024-08-02 ENCOUNTER — OFFICE VISIT (OUTPATIENT)
Dept: FAMILY MEDICINE CLINIC | Facility: CLINIC | Age: 64
End: 2024-08-02
Payer: MEDICARE

## 2024-08-02 VITALS
HEIGHT: 72 IN | HEART RATE: 75 BPM | SYSTOLIC BLOOD PRESSURE: 140 MMHG | DIASTOLIC BLOOD PRESSURE: 78 MMHG | WEIGHT: 188 LBS | BODY MASS INDEX: 25.47 KG/M2 | RESPIRATION RATE: 18 BRPM | TEMPERATURE: 98.7 F | OXYGEN SATURATION: 98 %

## 2024-08-02 DIAGNOSIS — U07.1 COVID-19 VIRUS INFECTION: Primary | ICD-10-CM

## 2024-08-02 LAB
EXPIRATION DATE: ABNORMAL
FLUAV AG UPPER RESP QL IA.RAPID: NOT DETECTED
FLUBV AG UPPER RESP QL IA.RAPID: NOT DETECTED
INTERNAL CONTROL: ABNORMAL
Lab: ABNORMAL
SARS-COV-2 AG UPPER RESP QL IA.RAPID: DETECTED

## 2024-08-02 PROCEDURE — 1126F AMNT PAIN NOTED NONE PRSNT: CPT | Performed by: INTERNAL MEDICINE

## 2024-08-02 PROCEDURE — 87428 SARSCOV & INF VIR A&B AG IA: CPT | Performed by: INTERNAL MEDICINE

## 2024-08-02 PROCEDURE — 99214 OFFICE O/P EST MOD 30 MIN: CPT | Performed by: INTERNAL MEDICINE

## 2024-08-02 PROCEDURE — 3078F DIAST BP <80 MM HG: CPT | Performed by: INTERNAL MEDICINE

## 2024-08-02 PROCEDURE — 3077F SYST BP >= 140 MM HG: CPT | Performed by: INTERNAL MEDICINE

## 2024-08-02 RX ORDER — ACETAMINOPHEN 500 MG
500 TABLET ORAL EVERY 6 HOURS PRN
COMMUNITY

## 2024-08-02 NOTE — PROGRESS NOTES
Subjective chief complaint is headache weakness and sweating.  Elia Walsh is a 64 y.o. male.     Balta Donahue is here today for symptoms consistent with COVID.  He had a known exposure few days ago.  His symptoms began yesterday.  He is having nasal congestion stomachache nausea and weakness.  He is having fever and sweats.  His COVID test today was positive.  He is not any more short of breath than usual.  His O2 saturation at rest here today appears to be okay.  We did discuss his treatment options.  His creatinine is normal and his GFR is normal.  We did review his medicines.  He likely should not take his tamsulosin or rosuvastatin with this.  We advised taking half a dose of his Eliquis twice daily and only half of his amlodipine.  We did advise of the side effects of Paxlovid.  We did advise that most of this will be in the gastrointestinal tract.  He is willing to go ahead and take it despite already having some gastrointestinal symptoms    The following portions of the patient's history were reviewed and updated as appropriate: allergies, current medications, past family history, past medical history, past social history, past surgical history, and problem list.    Review of Systems   Constitutional:  Positive for diaphoresis.   HENT:  Positive for congestion and rhinorrhea.    Musculoskeletal:  Positive for myalgias.   Neurological:  Positive for headaches.     Objective   Physical Exam  Vitals and nursing note reviewed.   Constitutional:       Appearance: Normal appearance.   HENT:      Right Ear: Tympanic membrane normal.      Left Ear: Tympanic membrane and ear canal normal.      Nose: Congestion and rhinorrhea present.      Mouth/Throat:      Pharynx: Posterior oropharyngeal erythema present.   Cardiovascular:      Rate and Rhythm: Normal rate and regular rhythm.   Pulmonary:      Effort: Pulmonary effort is normal. No respiratory distress.      Breath sounds: No wheezing, rhonchi or rales.    Neurological:      Mental Status: He is alert.     Assessment & Plan   Diagnoses and all orders for this visit:    1. COVID-19 virus infection (Primary)    Other orders  -     Nirmatrelvir & Ritonavir, 300mg/100mg, (PAXLOVID); Take 3 tablets by mouth 2 (Two) Times a Day.  Dispense: 30 tablet; Refill: 0    Godfrey is here today for symptoms consistent with COVID.  His in office COVID test was rapidly positive.  We did discuss normal treatment options for viruses including rest, increased fluids, Tylenol for aches and fever, and Mucinex DM for cough and congestion.  We did discuss Paxlovid and side effects.  I did write out how he is to take certain medicines that interact with this including his tamsulosin, rosuvastatin, amlodipine, and Eliquis.

## 2024-08-21 NOTE — PROGRESS NOTES
Date of Office Visit: 2024  Encounter Provider: TERA Corona  Place of Service: [facility] CARDIOLOGY  Patient Name: Elia Walsh  :1960    Chief complaint  Paroxysmal atrial fibrillation     History of Present Illness  Patient is a 64 y.o. year old male  patient of Dr. Fischer. Past medical history includes hypertension, hyperlipidemia, rectal bleeding and untreated sleep apnea.  And  he was noted to have atrial fibrillation.  Echocardiogram showed normal systolic function with ejection fraction of 60 to 65% and mild left ventricular hypertrophy, no significant valvular heart disease.  Stress perfusion study in 2020 was negative for ischemia.  He was treated with Eliquis and metoprolol.     Interval history  Patient presents today for routine follow up. I will visit with him today and have reviewed his medical record. Since last visit, he has overall been doing well and has remained active playing golf with no exertional symptoms. He had COVID-19 a few weeks ago and has recovered well from this. BP today is slightly elevated but he brings readings from home that are much better and less than 130/80 on average. He denies palpitations, shortness of breath, edema, dizziness, chest pain or chest pressure, fatigue, syncope or presyncope.    Past Medical History:   Diagnosis Date    Allergic     Anticoagulated     BPH (benign prostatic hyperplasia)     Cataract     DJD (degenerative joint disease)     Essential hypertension, benign     GERD (gastroesophageal reflux disease)     Glaucoma     Grief reaction     mxl family members lost in few yrs time    Impaired glucose metabolism     Marijuana use, continuous     Mixed hyperlipidemia     POLY (obstructive sleep apnea)     Home sleep study failed but pt does not want cpap. was reported as mild POLY with AHI 7.4 events per hour, possibly underestimated. The patient snored 14% of the total monitoring time.     PAF (paroxysmal atrial fibrillation)     Rectal bleeding      Past Surgical History:   Procedure Laterality Date    COLONOSCOPY      2013    COLONOSCOPY  04/16/2013    Jeffrey Ascencio MD    COLONOSCOPY  09/29/2006    Jeffrey Ascencio MD    ENDOSCOPY  05/28/2015    With biopsies, Al Haddad MD    ENDOSCOPY  09/29/2006    With biopsies, Jeffrey Ascencio MD    HEMORRHOIDECTOMY      HERNIA REPAIR      X2     Outpatient Medications Prior to Visit   Medication Sig Dispense Refill    acetaminophen (TYLENOL) 500 MG tablet Take 1 tablet by mouth Every 6 (Six) Hours As Needed for Mild Pain.      amLODIPine (NORVASC) 5 MG tablet Take 1 tablet by mouth Daily. 90 tablet 1    apixaban (Eliquis) 5 MG tablet tablet Take 1 tablet by mouth Every 12 (Twelve) Hours. 180 tablet 1    finasteride (PROSCAR) 5 MG tablet Take 1 tablet by mouth Daily.      Hydrocort-Pramoxine, Perianal, (Proctofoam HC) 1-1 % rectal foam Insert 1 application into the rectum 2 (Two) Times a Day. 10 g 2    icosapent ethyl (VASCEPA) 1 g capsule capsule Take 2 g by mouth 2 (Two) Times a Day With Meals. 360 capsule 1    lisinopril (PRINIVIL,ZESTRIL) 40 MG tablet TAKE ONE TABLET BY MOUTH DAILY 90 tablet 1    metoprolol tartrate (LOPRESSOR) 25 MG tablet TAKE 1 TABLET BY MOUTH TWICE A  tablet 1    Multiple Vitamins-Minerals (MULTIVITAL PO) Take  by mouth Daily.      omeprazole (priLOSEC) 40 MG capsule TAKE 1 CAPSULE BY MOUTH EVERY OTHER DAY 45 capsule 0    Rocklatan 0.02-0.005 % solution Every Night.      rosuvastatin (CRESTOR) 20 MG tablet Take 1 tablet by mouth Daily. 90 tablet 1    tamsulosin (FLOMAX) 0.4 MG capsule 24 hr capsule 1 capsule Every Other Day.      timolol (TIMOPTIC) 0.5 % ophthalmic solution Administer 1 drop to both eyes 2 (Two) Times a Day.      Nirmatrelvir & Ritonavir, 300mg/100mg, (PAXLOVID) Take 3 tablets by mouth 2 (Two) Times a Day. 30 tablet 0     No facility-administered medications prior to visit.       Allergies as of  "2024    (No Known Allergies)     Social History     Socioeconomic History    Marital status:     Number of children: 4   Tobacco Use    Smoking status: Former     Current packs/day: 0.00     Average packs/day: 1 pack/day for 25.0 years (25.0 ttl pk-yrs)     Types: Cigarettes     Start date: 1968     Quit date: 1993     Years since quittin.2    Smokeless tobacco: Never    Tobacco comments:     35 years ago   Vaping Use    Vaping status: Never Used   Substance and Sexual Activity    Alcohol use: Yes     Alcohol/week: 4.0 - 6.0 standard drinks of alcohol     Types: 4 - 6 Cans of beer per week     Comment: socially/4 or 5/week; Daily caffeine use    Drug use: Yes     Types: Marijuana     Comment: for glaucoma    Sexual activity: Not Currently     Family History   Problem Relation Age of Onset    Hypertension Mother 54    Aneurysm Father 70     Review of Systems   Constitutional: Negative for malaise/fatigue.   Cardiovascular:  Negative for chest pain, claudication, dyspnea on exertion, leg swelling, near-syncope, orthopnea, palpitations, paroxysmal nocturnal dyspnea and syncope.   Respiratory:  Negative for shortness of breath.    Neurological:  Negative for brief paralysis, dizziness, headaches and light-headedness.   All other systems reviewed and are negative.       Objective:     Vitals:    24 0805   BP: 133/84   BP Location: Left arm   Patient Position: Sitting   Cuff Size: Adult   Pulse: 51   SpO2: 98%   Weight: 87.4 kg (192 lb 9.6 oz)   Height: 182.9 cm (72\")     Body mass index is 26.12 kg/m².    Vitals reviewed.   Constitutional:       General: Not in acute distress.     Appearance: Well-developed and not in distress. Not diaphoretic.   HENT:      Head: Normocephalic.   Pulmonary:      Effort: Pulmonary effort is normal. No respiratory distress.      Breath sounds: Normal breath sounds. No wheezing. No rhonchi. No rales.   Cardiovascular:      Normal rate. Regular rhythm.      " "Murmurs: There is no murmur.   Pulses:     Radial: 2+ bilaterally.  Edema:     Peripheral edema absent.   Skin:     General: Skin is warm and dry. There is no cyanosis.      Findings: No rash.   Neurological:      Mental Status: Alert and oriented to person, place, and time.   Psychiatric:         Behavior: Behavior normal. Behavior is cooperative.         Thought Content: Thought content normal.         Judgment: Judgment normal.       Lab Review:     Lab Results   Component Value Date     06/07/2024     11/08/2023    K 4.6 06/07/2024    K 4.5 11/08/2023     06/07/2024     11/08/2023    CO2 25.7 06/07/2024    CO2 23 11/08/2023    BUN 18 06/07/2024    BUN 18 11/08/2023    CREATININE 0.87 06/07/2024    CREATININE 0.97 11/08/2023    EGFRIFNONA 89 09/10/2021    EGFRIFNONA 76 03/04/2021    GLUCOSE 112 (H) 06/07/2024    GLUCOSE 105 (H) 11/08/2023    CALCIUM 9.6 06/07/2024    CALCIUM 10.0 11/08/2023    PROTENTOTREF 7.2 06/07/2024    PROTENTOTREF 7.2 11/08/2023    ALBUMIN 4.7 06/07/2024    ALBUMIN 4.5 11/08/2023    BILITOT 0.7 06/07/2024    BILITOT 0.6 11/08/2023    AST 25 06/07/2024    AST 27 11/08/2023    ALT 30 06/07/2024    ALT 34 11/08/2023     Lab Results   Component Value Date    WBC 7.03 06/07/2024    WBC 8.5 11/08/2023    HGB 15.5 06/07/2024    HGB 16.3 11/08/2023    HCT 45.2 06/07/2024    HCT 47.1 11/08/2023    MCV 96.6 06/07/2024    MCV 96 11/08/2023     06/07/2024     11/08/2023     No results found for: \"PROBNP\", \"BNP\"  No results found for: \"CKTOTAL\", \"CKMB\", \"CKMBINDEX\", \"TROPONINI\", \"TROPONINT\"  Lab Results   Component Value Date    TSH 2.160 09/27/2022    TSH 2.250 11/24/2020      Lipid Panel          11/8/2023    08:41 6/7/2024    10:50   Lipid Panel   Total Cholesterol 170  139    Triglycerides 111  109    HDL Cholesterol 48  42    VLDL Cholesterol 20  20    LDL Cholesterol  102  77           ECG 12 Lead    Date/Time: 8/22/2024 9:11 AM  Performed by: Therese Green " TERA PETTIT    Authorized by: Therese Green APRN  Comparison: compared with previous ECG   Rhythm: sinus rhythm  BPM: 51  Comments: Similar to prior        Assessment:       Diagnosis Plan   1. Paroxysmal atrial fibrillation        2. Essential hypertension        3. Mixed hyperlipidemia        4. LVH (left ventricular hypertrophy)        5. POLY (obstructive sleep apnea)          Plan:       1.  Paroxysmal atrial fibrillation. Maintaining sinus rhythm and tolerating Eliquis with no falls or abnormal bleeding.  2.  Hypertension.  Avoiding AV stacey blocker therapy due to relative bradycardia.    3.  Obstructive sleep apnea.  Untreated   4.  Elevated glucose.  He admits he can do better with low carb diet. Managed by PCP  5.  Dyslipidemia.  He is taking fish oil in the form of Vascepa.  Followed by PCP  6.  Rectal bleeding.  Very infrequent and has followed up with Dr. Rooney regarding this. None since last visit  7.  Recent COVID infection. He seems to have recovered well from this.      Time Spent: I spent 30 minutes caring for Elia on this date of service. This time includes time spent by me in the following activities: preparing for the visit, reviewing tests, performing a medically appropriate examination and/or evaluations, counseling and educating the patient/family/caregiver, ordering medications, tests, or procedures, documenting information in the medical record, and independently interpreting results and communicating that information with the patient/family/caregiver.   I spent 1 minutes on the separately reported service of ECG. This time is not included in the time used to support the E/M service also reported today.        Your medication list            Accurate as of August 22, 2024  9:14 AM. If you have any questions, ask your nurse or doctor.                CONTINUE taking these medications        Instructions Last Dose Given Next Dose Due   acetaminophen 500 MG tablet  Commonly known as:  TYLENOL      Take 1 tablet by mouth Every 6 (Six) Hours As Needed for Mild Pain.       amLODIPine 5 MG tablet  Commonly known as: NORVASC      Take 1 tablet by mouth Daily.       apixaban 5 MG tablet tablet  Commonly known as: Eliquis      Take 1 tablet by mouth Every 12 (Twelve) Hours.       finasteride 5 MG tablet  Commonly known as: PROSCAR      Take 1 tablet by mouth Daily.       icosapent ethyl 1 g capsule capsule  Commonly known as: VASCEPA      Take 2 g by mouth 2 (Two) Times a Day With Meals.       lisinopril 40 MG tablet  Commonly known as: PRINIVIL,ZESTRIL      TAKE ONE TABLET BY MOUTH DAILY       metoprolol tartrate 25 MG tablet  Commonly known as: LOPRESSOR      TAKE 1 TABLET BY MOUTH TWICE A DAY       multivitamin with minerals tablet tablet      Take  by mouth Daily.       omeprazole 40 MG capsule  Commonly known as: priLOSEC      TAKE 1 CAPSULE BY MOUTH EVERY OTHER DAY       Proctofoam HC 1-1 % rectal foam  Generic drug: Hydrocort-Pramoxine (Perianal)      Insert 1 application into the rectum 2 (Two) Times a Day.       Rocklatan 0.02-0.005 % solution  Generic drug: Netarsudil-Latanoprost      Every Night.       rosuvastatin 20 MG tablet  Commonly known as: CRESTOR      Take 1 tablet by mouth Daily.       tamsulosin 0.4 MG capsule 24 hr capsule  Commonly known as: FLOMAX      1 capsule Every Other Day.       timolol 0.5 % ophthalmic solution  Commonly known as: TIMOPTIC      Administer 1 drop to both eyes 2 (Two) Times a Day.                Patient is no longer taking -.  I corrected the med list to reflect this.  I did not stop these medications.    Return for Keep January appointment.      Dictated utilizing Dragon dictation

## 2024-08-22 ENCOUNTER — OFFICE VISIT (OUTPATIENT)
Dept: CARDIOLOGY | Facility: CLINIC | Age: 64
End: 2024-08-22
Payer: MEDICARE

## 2024-08-22 VITALS
WEIGHT: 192.6 LBS | BODY MASS INDEX: 26.09 KG/M2 | HEIGHT: 72 IN | HEART RATE: 51 BPM | DIASTOLIC BLOOD PRESSURE: 84 MMHG | OXYGEN SATURATION: 98 % | SYSTOLIC BLOOD PRESSURE: 133 MMHG

## 2024-08-22 DIAGNOSIS — E78.2 MIXED HYPERLIPIDEMIA: ICD-10-CM

## 2024-08-22 DIAGNOSIS — I10 ESSENTIAL HYPERTENSION: ICD-10-CM

## 2024-08-22 DIAGNOSIS — I51.7 LVH (LEFT VENTRICULAR HYPERTROPHY): ICD-10-CM

## 2024-08-22 DIAGNOSIS — I48.0 PAROXYSMAL ATRIAL FIBRILLATION: Primary | ICD-10-CM

## 2024-08-22 DIAGNOSIS — G47.33 OSA (OBSTRUCTIVE SLEEP APNEA): ICD-10-CM

## 2024-08-26 RX ORDER — ROSUVASTATIN CALCIUM 20 MG/1
20 TABLET, COATED ORAL DAILY
Qty: 90 TABLET | Refills: 1 | Status: SHIPPED | OUTPATIENT
Start: 2024-08-26

## 2024-09-13 RX ORDER — LISINOPRIL 40 MG/1
40 TABLET ORAL DAILY
Qty: 90 TABLET | Refills: 1 | Status: SHIPPED | OUTPATIENT
Start: 2024-09-13

## 2024-10-07 DIAGNOSIS — K21.00 GASTROESOPHAGEAL REFLUX DISEASE WITH ESOPHAGITIS WITHOUT HEMORRHAGE: ICD-10-CM

## 2024-10-08 RX ORDER — OMEPRAZOLE 40 MG/1
40 CAPSULE, DELAYED RELEASE ORAL EVERY OTHER DAY
Qty: 45 CAPSULE | Refills: 0 | Status: SHIPPED | OUTPATIENT
Start: 2024-10-08

## 2024-10-18 ENCOUNTER — CLINICAL SUPPORT (OUTPATIENT)
Dept: FAMILY MEDICINE CLINIC | Facility: CLINIC | Age: 64
End: 2024-10-18
Payer: MEDICARE

## 2024-10-18 DIAGNOSIS — Z23 NEEDS FLU SHOT: Primary | ICD-10-CM

## 2024-10-18 PROCEDURE — 90662 IIV NO PRSV INCREASED AG IM: CPT | Performed by: INTERNAL MEDICINE

## 2024-10-18 PROCEDURE — G0008 ADMIN INFLUENZA VIRUS VAC: HCPCS | Performed by: INTERNAL MEDICINE

## 2024-11-01 ENCOUNTER — CLINICAL SUPPORT (OUTPATIENT)
Dept: FAMILY MEDICINE CLINIC | Facility: CLINIC | Age: 64
End: 2024-11-01
Payer: MEDICARE

## 2024-11-01 DIAGNOSIS — Z23 NEED FOR COVID-19 VACCINE: Primary | ICD-10-CM

## 2024-11-01 PROCEDURE — 90480 ADMN SARSCOV2 VAC 1/ONLY CMP: CPT | Performed by: INTERNAL MEDICINE

## 2024-11-01 PROCEDURE — 91320 SARSCV2 VAC 30MCG TRS-SUC IM: CPT | Performed by: INTERNAL MEDICINE

## 2024-11-04 RX ORDER — APIXABAN 5 MG/1
5 TABLET, FILM COATED ORAL EVERY 12 HOURS
Qty: 180 TABLET | Refills: 1 | Status: SHIPPED | OUTPATIENT
Start: 2024-11-04

## 2024-12-18 ENCOUNTER — OFFICE VISIT (OUTPATIENT)
Dept: FAMILY MEDICINE CLINIC | Facility: CLINIC | Age: 64
End: 2024-12-18
Payer: MEDICARE

## 2024-12-18 VITALS
HEART RATE: 63 BPM | RESPIRATION RATE: 16 BRPM | WEIGHT: 196.6 LBS | HEIGHT: 72 IN | DIASTOLIC BLOOD PRESSURE: 68 MMHG | OXYGEN SATURATION: 99 % | BODY MASS INDEX: 26.63 KG/M2 | SYSTOLIC BLOOD PRESSURE: 130 MMHG

## 2024-12-18 DIAGNOSIS — E78.2 MIXED HYPERLIPIDEMIA: ICD-10-CM

## 2024-12-18 DIAGNOSIS — I10 ESSENTIAL HYPERTENSION: Primary | ICD-10-CM

## 2024-12-18 DIAGNOSIS — R73.09 IMPAIRED GLUCOSE METABOLISM: ICD-10-CM

## 2024-12-18 LAB
ALBUMIN SERPL-MCNC: 4.3 G/DL (ref 3.5–5.2)
ALBUMIN/GLOB SERPL: 1.4 G/DL
ALP SERPL-CCNC: 89 U/L (ref 39–117)
ALT SERPL-CCNC: 32 U/L (ref 1–41)
AST SERPL-CCNC: 26 U/L (ref 1–40)
BILIRUB SERPL-MCNC: 0.5 MG/DL (ref 0–1.2)
BUN SERPL-MCNC: 19 MG/DL (ref 8–23)
BUN/CREAT SERPL: 21.1 (ref 7–25)
CALCIUM SERPL-MCNC: 9.5 MG/DL (ref 8.6–10.5)
CHLORIDE SERPL-SCNC: 104 MMOL/L (ref 98–107)
CHOLEST SERPL-MCNC: 155 MG/DL (ref 0–200)
CO2 SERPL-SCNC: 23.8 MMOL/L (ref 22–29)
CREAT SERPL-MCNC: 0.9 MG/DL (ref 0.76–1.27)
EGFRCR SERPLBLD CKD-EPI 2021: 94.2 ML/MIN/1.73
GLOBULIN SER CALC-MCNC: 3 GM/DL
GLUCOSE SERPL-MCNC: 117 MG/DL (ref 65–99)
HBA1C MFR BLD: 5.6 % (ref 4.8–5.6)
HDLC SERPL-MCNC: 41 MG/DL (ref 40–60)
LDLC SERPL CALC-MCNC: 86 MG/DL (ref 0–100)
POTASSIUM SERPL-SCNC: 4.4 MMOL/L (ref 3.5–5.2)
PROT SERPL-MCNC: 7.3 G/DL (ref 6–8.5)
SODIUM SERPL-SCNC: 136 MMOL/L (ref 136–145)
TRIGL SERPL-MCNC: 163 MG/DL (ref 0–150)
VLDLC SERPL CALC-MCNC: 28 MG/DL (ref 5–40)

## 2024-12-18 PROCEDURE — 99214 OFFICE O/P EST MOD 30 MIN: CPT | Performed by: INTERNAL MEDICINE

## 2024-12-18 PROCEDURE — 1126F AMNT PAIN NOTED NONE PRSNT: CPT | Performed by: INTERNAL MEDICINE

## 2024-12-18 PROCEDURE — 3075F SYST BP GE 130 - 139MM HG: CPT | Performed by: INTERNAL MEDICINE

## 2024-12-18 PROCEDURE — 3078F DIAST BP <80 MM HG: CPT | Performed by: INTERNAL MEDICINE

## 2024-12-18 RX ORDER — METOPROLOL TARTRATE 25 MG/1
25 TABLET, FILM COATED ORAL 2 TIMES DAILY
Qty: 180 TABLET | Refills: 1 | Status: SHIPPED | OUTPATIENT
Start: 2024-12-18

## 2024-12-18 RX ORDER — AMLODIPINE BESYLATE 5 MG/1
5 TABLET ORAL DAILY
Qty: 90 TABLET | Refills: 1 | Status: SHIPPED | OUTPATIENT
Start: 2024-12-18

## 2024-12-18 RX ORDER — ROSUVASTATIN CALCIUM 20 MG/1
20 TABLET, COATED ORAL DAILY
Qty: 90 TABLET | Refills: 1 | Status: SHIPPED | OUTPATIENT
Start: 2024-12-18

## 2024-12-18 NOTE — PROGRESS NOTES
Subjective chief complaint is follow-up on blood pressure and blood sugar  Elia Walsh is a 64 y.o. male.     Hypertension  Pertinent negatives include no chest pain, headaches or neck pain.  Godfrey is here today for follow-up.  His blood pressure was okay to my exam.  He is not having any new symptoms of headache, dizziness, chest pain, or shortness of breath.  He did have some impaired glucose tolerance.  His last A1c however had improved from 5.9-5.7.  He is still seeing the urologist regarding some prostate symptoms.  He had a recent scan of his bladder.  I do not have those results we did discuss that his screening colonoscopy will be due in the year 2026    The following portions of the patient's history were reviewed and updated as appropriate: allergies, current medications, and problem list.    Review of Systems   Constitutional:  Negative for chills, diaphoresis, fatigue and fever.   HENT:  Negative for congestion and sore throat.    Respiratory:  Negative for cough.    Cardiovascular:  Negative for chest pain.   Gastrointestinal:  Negative for abdominal pain, nausea and vomiting.   Endocrine: Negative for polydipsia and polyuria.   Genitourinary:  Negative for dysuria.   Musculoskeletal:  Negative for myalgias and neck pain.   Skin:  Negative for rash.   Neurological:  Negative for weakness, numbness and headaches.     Objective   Physical Exam  Vitals and nursing note reviewed.   Constitutional:       Appearance: Normal appearance.   Neck:      Vascular: No carotid bruit.   Cardiovascular:      Rate and Rhythm: Normal rate and regular rhythm.      Pulses: Normal pulses.   Pulmonary:      Effort: Pulmonary effort is normal.      Breath sounds: No wheezing, rhonchi or rales.   Abdominal:      General: Bowel sounds are normal.      Palpations: Abdomen is soft.      Tenderness: There is no abdominal tenderness. There is no guarding or rebound.   Musculoskeletal:      Right lower leg: No edema.      Left  lower leg: No edema.   Neurological:      Mental Status: He is alert.     Assessment & Plan   Diagnoses and all orders for this visit:    1. Essential hypertension (Primary)  -     Comprehensive Metabolic Panel    2. Mixed hyperlipidemia  -     Lipid Panel    3. Impaired glucose metabolism  -     Comprehensive Metabolic Panel  -     Hemoglobin A1c    Other orders  -     amLODIPine (NORVASC) 5 MG tablet; Take 1 tablet by mouth Daily.  Dispense: 90 tablet; Refill: 1  -     metoprolol tartrate (LOPRESSOR) 25 MG tablet; Take 1 tablet by mouth 2 (Two) Times a Day.  Dispense: 180 tablet; Refill: 1  -     rosuvastatin (CRESTOR) 20 MG tablet; Take 1 tablet by mouth Daily.  Dispense: 90 tablet; Refill: 1      Godfrey is here today for follow-up.  He seems to be doing well on his current regimen and no changes were made.  We will check some appropriate labs here today.

## 2025-01-10 DIAGNOSIS — K21.00 GASTROESOPHAGEAL REFLUX DISEASE WITH ESOPHAGITIS WITHOUT HEMORRHAGE: ICD-10-CM

## 2025-01-13 RX ORDER — OMEPRAZOLE 40 MG/1
40 CAPSULE, DELAYED RELEASE ORAL EVERY OTHER DAY
Qty: 45 CAPSULE | Refills: 0 | Status: SHIPPED | OUTPATIENT
Start: 2025-01-13

## 2025-01-27 ENCOUNTER — OFFICE VISIT (OUTPATIENT)
Dept: CARDIOLOGY | Facility: CLINIC | Age: 65
End: 2025-01-27
Payer: MEDICARE

## 2025-01-27 VITALS
HEART RATE: 56 BPM | HEIGHT: 72 IN | WEIGHT: 198 LBS | DIASTOLIC BLOOD PRESSURE: 80 MMHG | SYSTOLIC BLOOD PRESSURE: 136 MMHG | BODY MASS INDEX: 26.82 KG/M2

## 2025-01-27 DIAGNOSIS — E78.2 MIXED HYPERLIPIDEMIA: ICD-10-CM

## 2025-01-27 DIAGNOSIS — I10 ESSENTIAL HYPERTENSION: ICD-10-CM

## 2025-01-27 DIAGNOSIS — G47.33 OSA (OBSTRUCTIVE SLEEP APNEA): ICD-10-CM

## 2025-01-27 DIAGNOSIS — Z79.01 CHRONIC ANTICOAGULATION: ICD-10-CM

## 2025-01-27 DIAGNOSIS — I48.0 PAROXYSMAL ATRIAL FIBRILLATION: Primary | ICD-10-CM

## 2025-01-27 NOTE — PROGRESS NOTES
Date of Office Visit: 2025  Encounter Provider: Kat Fischer MD  Place of Service: [facility] CARDIOLOGY  Patient Name: Elia Walsh  :1960    Chief complaint  Paroxysmal atrial fibrillation    History of Present Illness  Patient is a 64-year-old gentleman with history of hypertension, hyperlipidemia, rectal bleeding and untreated sleep apnea.  And  he was noted to have atrial fibrillation.  Echocardiogram showed normal systolic function with ejection fraction of 60 to 65% and mild left ventricular hypertrophy, no significant valvular heart disease.  Stress perfusion study in 2020 was negative for ischemia.  He was treated with Eliquis and metoprolol.    Since last visit patient has had no chest pain palpitations syncope near syncope.  He is not treating sleep apnea.  He is going bowling once a week.  His blood pressures from home are typically in the 140s over 50s with heart rate in the 50s.  He is in sinus rhythm today.  He states he continues to have occasional rare occurrences of bloody stool these are quite small and brief.  He plans to follow-up with GI service in the near future as he is also due for colonoscopy with history of multiple polyps in the past.    Past Medical History:   Diagnosis Date    Allergic     Anticoagulated     BPH (benign prostatic hyperplasia)     Cataract     DJD (degenerative joint disease)     Essential hypertension, benign     GERD (gastroesophageal reflux disease)     Glaucoma     Grief reaction     mxl family members lost in few yrs time    Impaired glucose metabolism     Marijuana use, continuous     Mixed hyperlipidemia     POLY (obstructive sleep apnea)     Home sleep study failed but pt does not want cpap. was reported as mild POLY with AHI 7.4 events per hour, possibly underestimated. The patient snored 14% of the total monitoring time.    PAF (paroxysmal atrial fibrillation)     Rectal bleeding      Past Surgical  History:   Procedure Laterality Date    COLONOSCOPY      2013    COLONOSCOPY  04/16/2013    Jeffrey Ascencio MD    COLONOSCOPY  09/29/2006    Jeffrey Ascencio MD    ENDOSCOPY  05/28/2015    With biopsies, Al Haddad MD    ENDOSCOPY  09/29/2006    With biopsies, Jeffrey Ascencio MD    HEMORRHOIDECTOMY      HERNIA REPAIR      X2     Outpatient Medications Prior to Visit   Medication Sig Dispense Refill    acetaminophen (TYLENOL) 500 MG tablet Take 1 tablet by mouth Every 6 (Six) Hours As Needed for Mild Pain.      amLODIPine (NORVASC) 5 MG tablet Take 1 tablet by mouth Daily. 90 tablet 1    Eliquis 5 MG tablet tablet TAKE 1 TABLET EVERY 12     HOURS 180 tablet 1    finasteride (PROSCAR) 5 MG tablet Take 1 tablet by mouth Daily.      icosapent ethyl (VASCEPA) 1 g capsule capsule Take 2 g by mouth 2 (Two) Times a Day With Meals. 360 capsule 1    lisinopril (PRINIVIL,ZESTRIL) 40 MG tablet TAKE ONE TABLET BY MOUTH DAILY 90 tablet 1    metoprolol tartrate (LOPRESSOR) 25 MG tablet Take 1 tablet by mouth 2 (Two) Times a Day. 180 tablet 1    Multiple Vitamins-Minerals (MULTIVITAL PO) Take  by mouth Daily.      omeprazole (priLOSEC) 40 MG capsule TAKE 1 CAPSULE BY MOUTH EVERY OTHER DAY 45 capsule 0    Rocklatan 0.02-0.005 % solution Every Night.      rosuvastatin (CRESTOR) 20 MG tablet Take 1 tablet by mouth Daily. 90 tablet 1    tamsulosin (FLOMAX) 0.4 MG capsule 24 hr capsule 1 capsule Every Other Day.      timolol (TIMOPTIC) 0.5 % ophthalmic solution Administer 1 drop to both eyes 2 (Two) Times a Day.      Hydrocort-Pramoxine, Perianal, (Proctofoam HC) 1-1 % rectal foam Insert 1 application into the rectum 2 (Two) Times a Day. 10 g 2     No facility-administered medications prior to visit.       Allergies as of 01/27/2025    (No Known Allergies)     Social History     Socioeconomic History    Marital status:     Number of children: 4   Tobacco Use    Smoking status: Former     Current packs/day: 0.00     Average  "packs/day: 1 pack/day for 25.0 years (25.0 ttl pk-yrs)     Types: Cigarettes     Start date: 1968     Quit date: 1993     Years since quittin.7    Smokeless tobacco: Never    Tobacco comments:     35 years ago   Vaping Use    Vaping status: Never Used   Substance and Sexual Activity    Alcohol use: Yes     Alcohol/week: 4.0 - 6.0 standard drinks of alcohol     Types: 4 - 6 Cans of beer per week     Comment: socially/4 or 5/week; Daily caffeine use    Drug use: Yes     Types: Marijuana     Comment: for glaucoma    Sexual activity: Not Currently     Family History   Problem Relation Age of Onset    Hypertension Mother 54    Aneurysm Father 70     Review of Systems   Constitutional: Negative for chills, fever, weight gain and weight loss.   Cardiovascular:  Negative for leg swelling.   Respiratory:  Negative for cough, snoring and wheezing.    Hematologic/Lymphatic: Negative for bleeding problem. Does not bruise/bleed easily.   Skin:  Negative for color change.   Musculoskeletal:  Negative for falls, joint pain and myalgias.   Gastrointestinal:  Negative for melena.   Genitourinary:  Negative for hematuria.   Neurological:  Positive for excessive daytime sleepiness.   Psychiatric/Behavioral:  Negative for depression. The patient is not nervous/anxious.         Objective:     Vitals:    25 0737   BP: 136/80   Pulse: 56   Weight: 89.8 kg (198 lb)   Height: 182.9 cm (72\")     Body mass index is 26.85 kg/m².    Vitals reviewed.   Constitutional:       Appearance: Well-developed.   Eyes:      General: No scleral icterus.        Right eye: No discharge.      Conjunctiva/sclera: Conjunctivae normal.      Pupils: Pupils are equal, round, and reactive to light.   HENT:      Head: Normocephalic.      Nose: Nose normal.   Neck:      Thyroid: No thyromegaly.      Vascular: No JVD.   Pulmonary:      Effort: Pulmonary effort is normal. No respiratory distress.      Breath sounds: Normal breath sounds. No " wheezing. No rales.   Cardiovascular:      Normal rate. Regular rhythm. Normal S1. Normal S2.       Murmurs: There is no murmur.      No gallop.    Pulses:     Intact distal pulses.      Carotid: 2+ bilaterally.     Radial: 2+ bilaterally.     Femoral: 2+ bilaterally.     Popliteal: 2+ bilaterally.     Dorsalis pedis: 2+ bilaterally.     Posterior tibial: 2+ bilaterally.  Edema:     Peripheral edema absent.   Abdominal:      General: Bowel sounds are normal. There is no distension.      Palpations: Abdomen is soft.      Tenderness: There is no abdominal tenderness. There is no rebound.   Musculoskeletal: Normal range of motion.         General: No tenderness.      Cervical back: Normal range of motion and neck supple. Skin:     General: Skin is warm and dry.      Findings: No erythema or rash.   Neurological:      Mental Status: Alert and oriented to person, place, and time.   Psychiatric:         Behavior: Behavior normal.         Thought Content: Thought content normal.         Judgment: Judgment normal.       Lab Review:   Lab Results - Last 18 Months   Lab Units 06/07/24  1050 11/08/23  0841   WBC 10*3/mm3 7.03 8.5   RBC 10*6/mm3 4.68 4.90   HEMOGLOBIN g/dL 15.5 16.3   HEMATOCRIT % 45.2 47.1   MCV fL 96.6 96   MCH pg 33.1* 33.3*   MCHC g/dL 34.3 34.6   RDW % 13.4 13.6   PLATELETS 10*3/mm3 215 218   NEUTROPHIL % % 58.6 66   LYMPHOCYTE % % 27.9 22   MONOCYTES % % 9.4 8   EOSINOPHIL % % 2.7 3   BASOPHIL % % 1.3 1   NEUTROS ABS 10*3/mm3 4.12 5.6   LYMPHS ABS 10*3/mm3 1.96 1.9   MONOS ABS 10*3/mm3 0.66 0.7   EOS ABS 10*3/mm3 0.19 0.3   BASOS ABS 10*3/mm3 0.09 0.1   IMM GRAN % % 0.1 0   IMMATURE GRANS (ABS) 10*3/mm3 0.01 0.0       Lab Results - Last 18 Months   Lab Units 12/18/24  1012 06/07/24  1050   GLUCOSE mg/dL 117* 112*   BUN mg/dL 19 18   CREATININE mg/dL 0.90 0.87   SODIUM mmol/L 136 138   POTASSIUM mmol/L 4.4 4.6   CHLORIDE mmol/L 104 102   CO2 mmol/L 23.8 25.7   CALCIUM mg/dL 9.5 9.6   ALBUMIN g/dL 4.3 4.7    ALT (SGPT) U/L 32 30   AST (SGOT) U/L 26 25   ALK PHOS U/L 89 75   BILIRUBIN mg/dL 0.5 0.7   BUN / CREAT RATIO  21.1 20.7     Lab Results - Last 18 Months   Lab Units 12/18/24  1012 06/07/24  1050   TRIGLYCERIDES mg/dL 163* 109   HDL CHOL mg/dL 41 42   LDL CHOL mg/dL 86 77   VLDL CHOLESTEROL SABAS mg/dL 28 20         ECG 12 Lead    Date/Time: 1/27/2025 7:51 AM  Performed by: Kat Fischer MD    Authorized by: Kat Fischer MD  Comparison: compared with previous ECG   Similar to previous ECG  Rhythm: sinus bradycardia  Conduction: 1st degree AV block and non-specific intraventricular conduction delay    Clinical impression: abnormal EKG            Diagnosis Plan   1. Paroxysmal atrial fibrillation  ECG 12 Lead      2. Essential hypertension        3. Mixed hyperlipidemia        4. Chronic anticoagulation        5. POLY (obstructive sleep apnea)          Plan:       1.  Paroxysmal atrial fibrillation.  On low-dose metoprolol.  Also remains on Eliquis.  Maintaining sinus rhythm.  Has had occasional bloody stool but plans on GI follow-up in the near future.  2.  Hypertension.  He will monitor his blood pressure after he takes his meds and call if remains elevated.  He will also increase his exercise regimen.  3.  Obstructive sleep apnea.  Untreated   4.  Elevated glucose.  He admits he can do better with low carb diet.  5.  Dyslipidemia.  He is taking fish oil in the form of Vascepa.    6.  Rectal bleeding. As above.  This is chronic intermittent and mild though he will arrange follow-up with GI service  7.  Colonic polyp.       Time Spent: I spent 25 minutes caring for Elia on this date of service. This time includes time spent by me in the following activities: preparing for the visit, reviewing tests, obtaining and/or reviewing a separately obtained history, performing a medically appropriate examination and/or evaluation, counseling and educating the patient/family/caregiver, documenting information in the medical  record, and independently interpreting results and communicating that information with the patient/family/caregiver.   I spent 1 minutes on the separately reported service of ECG. This time is not included in the time used to support the E/M service also reported today.        Your medication list            Accurate as of January 27, 2025 11:59 PM. If you have any questions, ask your nurse or doctor.                CONTINUE taking these medications        Instructions Last Dose Given Next Dose Due   acetaminophen 500 MG tablet  Commonly known as: TYLENOL      Take 1 tablet by mouth Every 6 (Six) Hours As Needed for Mild Pain.       amLODIPine 5 MG tablet  Commonly known as: NORVASC      Take 1 tablet by mouth Daily.       Eliquis 5 MG tablet tablet  Generic drug: apixaban      TAKE 1 TABLET EVERY 12     HOURS       finasteride 5 MG tablet  Commonly known as: PROSCAR      Take 1 tablet by mouth Daily.       icosapent ethyl 1 g capsule capsule  Commonly known as: VASCEPA      Take 2 g by mouth 2 (Two) Times a Day With Meals.       lisinopril 40 MG tablet  Commonly known as: PRINIVIL,ZESTRIL      TAKE ONE TABLET BY MOUTH DAILY       metoprolol tartrate 25 MG tablet  Commonly known as: LOPRESSOR      Take 1 tablet by mouth 2 (Two) Times a Day.       multivitamin with minerals tablet tablet      Take  by mouth Daily.       omeprazole 40 MG capsule  Commonly known as: priLOSEC      TAKE 1 CAPSULE BY MOUTH EVERY OTHER DAY       Proctofoam HC 1-1 % rectal foam  Generic drug: Hydrocort-Pramoxine (Perianal)      Insert 1 application into the rectum 2 (Two) Times a Day.       Rocklatan 0.02-0.005 % solution  Generic drug: Netarsudil-Latanoprost      Every Night.       rosuvastatin 20 MG tablet  Commonly known as: CRESTOR      Take 1 tablet by mouth Daily.       tamsulosin 0.4 MG capsule 24 hr capsule  Commonly known as: FLOMAX      1 capsule Every Other Day.       timolol 0.5 % ophthalmic solution  Commonly known as:  TIMOPTIC      Administer 1 drop to both eyes 2 (Two) Times a Day.                Patient is no longer taking -.  I corrected the med list to reflect this.  I did not stop these medications.      Dictated utilizing Dragon dictation

## 2025-02-05 RX ORDER — PRAMOXINE HYDROCHLORIDE HYDROCORTISONE ACETATE 100; 100 MG/10G; MG/10G
1 AEROSOL, FOAM TOPICAL 2 TIMES DAILY
Qty: 10 G | Refills: 2 | Status: SHIPPED | OUTPATIENT
Start: 2025-02-05

## 2025-02-05 NOTE — TELEPHONE ENCOUNTER
"  Caller: Elia Walsh \"Godfrey\"    Relationship: Self    Best call back number: [phone]    Requested Prescriptions:   Requested Prescriptions     Pending Prescriptions Disp Refills    Hydrocort-Pramoxine, Perianal, (Proctofoam HC) 1-1 % rectal foam 10 g 2     Sig: Insert 1 Application into the rectum 2 (Two) Times a Day.        Pharmacy where request should be sent: [facility] [id number] [id number] [place] BY AT [facility] ([facility] )  [phone] [facility] [phone]      Last office visit with prescribing clinician: 12/18/2024   Last telemedicine visit with prescribing clinician: Visit date not found   Next office visit with prescribing clinician: Visit date not found     Additional details provided by patient: OUT OF MEDICATION     Does the patient have less than a 3 day supply:  [x] Yes  [] No      Mary Carmen Sykes Rep   02/05/25 13:19 EST         "

## 2025-03-10 RX ORDER — LISINOPRIL 40 MG/1
40 TABLET ORAL DAILY
Qty: 90 TABLET | Refills: 1 | Status: SHIPPED | OUTPATIENT
Start: 2025-03-10

## 2025-03-24 RX ORDER — ICOSAPENT ETHYL 1 G/1
2 CAPSULE ORAL 2 TIMES DAILY WITH MEALS
Qty: 360 CAPSULE | Refills: 1 | Status: SHIPPED | OUTPATIENT
Start: 2025-03-24

## 2025-04-09 DIAGNOSIS — K21.00 GASTROESOPHAGEAL REFLUX DISEASE WITH ESOPHAGITIS WITHOUT HEMORRHAGE: ICD-10-CM

## 2025-04-09 RX ORDER — OMEPRAZOLE 40 MG/1
40 CAPSULE, DELAYED RELEASE ORAL
Qty: 45 CAPSULE | Refills: 0 | Status: SHIPPED | OUTPATIENT
Start: 2025-04-09

## 2025-05-07 NOTE — TELEPHONE ENCOUNTER
"Caller: Elia Walsh \"Godfrey\"    Relationship: Self    Best call back number: [phone]    Requested Prescriptions:   Requested Prescriptions     Pending Prescriptions Disp Refills    apixaban (Eliquis) 5 MG tablet tablet 180 tablet 1     Sig: Take 1 tablet by mouth.      Pharmacy where request should be sent: [facility] [id number] [phone] [facility] [phone]      Last office visit with prescribing clinician: 12/18/2024   Last telemedicine visit with prescribing clinician: Visit date not found   Next office visit with prescribing clinician: Visit date not found     Additional details provided by patient: 2.5 DAYS    Does the patient have less than a 3 day supply:  [x] Yes  [] No    Would you like a call back once the refill request has been completed: [x] Yes [] No    If the office needs to give you a call back, can they leave a voicemail: [x] Yes [] No    Mary Carmen aBldwin Rep   05/07/25 11:48 EDT       "

## 2025-05-20 NOTE — TELEPHONE ENCOUNTER
"  Caller: Elia Walsh \"Godfrey\"    Relationship: Self    Best call back number: [phone]         Who are you requesting to speak with (clinical staff, provider,  specific staff member):       What was the call regarding: CAN YOU PLEASE CANCEL THE PRESCRIPTION FOR apixaban (Eliquis) 5 MG tablet tablet THAT WAS SENT TO LOCAL PHARMACY AND RESEND TO   [facility] - [facility] - [address] [facility] AT [facility] - [phone]  - [phone]  [phone]   "

## 2025-06-06 ENCOUNTER — INPATIENT HOSPITAL (OUTPATIENT)
Dept: URBAN - METROPOLITAN AREA HOSPITAL 113 | Facility: HOSPITAL | Age: 65
End: 2025-06-06
Payer: MEDICARE

## 2025-06-06 ENCOUNTER — HOSPITAL ENCOUNTER (INPATIENT)
Facility: HOSPITAL | Age: 65
LOS: 3 days | Discharge: HOME | End: 2025-06-09
Attending: EMERGENCY MEDICINE | Admitting: HOSPITALIST
Payer: MEDICARE

## 2025-06-06 ENCOUNTER — APPOINTMENT (OUTPATIENT)
Dept: CT IMAGING | Facility: HOSPITAL | Age: 65
End: 2025-06-06
Payer: MEDICARE

## 2025-06-06 DIAGNOSIS — K86.89 OTHER SPECIFIED DISEASES OF PANCREAS: ICD-10-CM

## 2025-06-06 DIAGNOSIS — R10.13 EPIGASTRIC PAIN: ICD-10-CM

## 2025-06-06 DIAGNOSIS — K85.90 ACUTE PANCREATITIS WITHOUT INFECTION OR NECROSIS, UNSPECIFIED PANCREATITIS TYPE: Primary | ICD-10-CM

## 2025-06-06 DIAGNOSIS — K86.89 PANCREATIC MASS: ICD-10-CM

## 2025-06-06 DIAGNOSIS — Z87.891 PERSONAL HISTORY OF NICOTINE DEPENDENCE: ICD-10-CM

## 2025-06-06 DIAGNOSIS — K85.90 ACUTE PANCREATITIS WITHOUT NECROSIS OR INFECTION, UNSPECIFIE: ICD-10-CM

## 2025-06-06 PROBLEM — R10.9 ABDOMINAL PAIN: Status: ACTIVE | Noted: 2025-06-06

## 2025-06-06 LAB
ALBUMIN SERPL-MCNC: 4.6 G/DL (ref 3.5–5.2)
ALBUMIN/GLOB SERPL: 1.3 G/DL
ALP SERPL-CCNC: 108 U/L (ref 39–117)
ALT SERPL W P-5'-P-CCNC: 23 U/L (ref 1–41)
ANION GAP SERPL CALCULATED.3IONS-SCNC: 11 MMOL/L (ref 5–15)
APTT PPP: 30.8 SECONDS (ref 22.7–35.4)
AST SERPL-CCNC: 25 U/L (ref 1–40)
BACTERIA UR QL AUTO: NORMAL /HPF
BASOPHILS # BLD AUTO: 0.06 10*3/MM3 (ref 0–0.2)
BASOPHILS NFR BLD AUTO: 0.4 % (ref 0–1.5)
BILIRUB SERPL-MCNC: 0.9 MG/DL (ref 0–1.2)
BILIRUB UR QL STRIP: NEGATIVE
BUN SERPL-MCNC: 13 MG/DL (ref 8–23)
BUN/CREAT SERPL: 16.5 (ref 7–25)
CALCIUM SPEC-SCNC: 9.7 MG/DL (ref 8.6–10.5)
CANCER AG19-9 SERPL-ACNC: 32 U/ML
CEA SERPL-MCNC: 1.32 NG/ML
CHLORIDE SERPL-SCNC: 103 MMOL/L (ref 98–107)
CLARITY UR: CLEAR
CO2 SERPL-SCNC: 21 MMOL/L (ref 22–29)
COLOR UR: YELLOW
CREAT SERPL-MCNC: 0.79 MG/DL (ref 0.76–1.27)
D-LACTATE SERPL-SCNC: 0.8 MMOL/L (ref 0.5–2)
DEPRECATED RDW RBC AUTO: 46.7 FL (ref 37–54)
EGFRCR SERPLBLD CKD-EPI 2021: 97.4 ML/MIN/1.73
EOSINOPHIL # BLD AUTO: 0.14 10*3/MM3 (ref 0–0.4)
EOSINOPHIL NFR BLD AUTO: 1 % (ref 0.3–6.2)
ERYTHROCYTE [DISTWIDTH] IN BLOOD BY AUTOMATED COUNT: 13.2 % (ref 12.3–15.4)
GLOBULIN UR ELPH-MCNC: 3.5 GM/DL
GLUCOSE SERPL-MCNC: 133 MG/DL (ref 65–99)
GLUCOSE UR STRIP-MCNC: NEGATIVE MG/DL
HCT VFR BLD AUTO: 47 % (ref 37.5–51)
HGB BLD-MCNC: 16.2 G/DL (ref 13–17.7)
HGB UR QL STRIP.AUTO: ABNORMAL
HYALINE CASTS UR QL AUTO: NORMAL /LPF
IMM GRANULOCYTES # BLD AUTO: 0.03 10*3/MM3 (ref 0–0.05)
IMM GRANULOCYTES NFR BLD AUTO: 0.2 % (ref 0–0.5)
INR PPP: 1.34 (ref 0.9–1.1)
KETONES UR QL STRIP: ABNORMAL
LEUKOCYTE ESTERASE UR QL STRIP.AUTO: NEGATIVE
LIPASE SERPL-CCNC: 2943 U/L (ref 13–60)
LYMPHOCYTES # BLD AUTO: 1.76 10*3/MM3 (ref 0.7–3.1)
LYMPHOCYTES NFR BLD AUTO: 12.6 % (ref 19.6–45.3)
MCH RBC QN AUTO: 33.2 PG (ref 26.6–33)
MCHC RBC AUTO-ENTMCNC: 34.5 G/DL (ref 31.5–35.7)
MCV RBC AUTO: 96.3 FL (ref 79–97)
MONOCYTES # BLD AUTO: 1.11 10*3/MM3 (ref 0.1–0.9)
MONOCYTES NFR BLD AUTO: 7.9 % (ref 5–12)
NEUTROPHILS NFR BLD AUTO: 10.87 10*3/MM3 (ref 1.7–7)
NEUTROPHILS NFR BLD AUTO: 77.9 % (ref 42.7–76)
NITRITE UR QL STRIP: NEGATIVE
NRBC BLD AUTO-RTO: 0 /100 WBC (ref 0–0.2)
PH UR STRIP.AUTO: 5.5 [PH] (ref 5–8)
PLATELET # BLD AUTO: 219 10*3/MM3 (ref 140–450)
PMV BLD AUTO: 9.9 FL (ref 6–12)
POTASSIUM SERPL-SCNC: 4.2 MMOL/L (ref 3.5–5.2)
PROT SERPL-MCNC: 8.1 G/DL (ref 6–8.5)
PROT UR QL STRIP: NEGATIVE
PROTHROMBIN TIME: 16.6 SECONDS (ref 11.7–14.2)
RBC # BLD AUTO: 4.88 10*6/MM3 (ref 4.14–5.8)
RBC # UR STRIP: NORMAL /HPF
REF LAB TEST METHOD: NORMAL
SODIUM SERPL-SCNC: 135 MMOL/L (ref 136–145)
SP GR UR STRIP: 1.01 (ref 1–1.03)
SQUAMOUS #/AREA URNS HPF: NORMAL /HPF
UROBILINOGEN UR QL STRIP: ABNORMAL
WBC # UR STRIP: NORMAL /HPF
WBC NRBC COR # BLD AUTO: 13.97 10*3/MM3 (ref 3.4–10.8)

## 2025-06-06 PROCEDURE — 85025 COMPLETE CBC W/AUTO DIFF WBC: CPT | Performed by: EMERGENCY MEDICINE

## 2025-06-06 PROCEDURE — 85610 PROTHROMBIN TIME: CPT | Performed by: EMERGENCY MEDICINE

## 2025-06-06 PROCEDURE — 86301 IMMUNOASSAY TUMOR CA 19-9: CPT | Performed by: HOSPITALIST

## 2025-06-06 PROCEDURE — 99222 1ST HOSP IP/OBS MODERATE 55: CPT | Performed by: INTERNAL MEDICINE

## 2025-06-06 PROCEDURE — 85730 THROMBOPLASTIN TIME PARTIAL: CPT | Performed by: EMERGENCY MEDICINE

## 2025-06-06 PROCEDURE — 82378 CARCINOEMBRYONIC ANTIGEN: CPT | Performed by: HOSPITALIST

## 2025-06-06 PROCEDURE — 25810000003 SODIUM CHLORIDE 0.9 % SOLUTION: Performed by: EMERGENCY MEDICINE

## 2025-06-06 PROCEDURE — 81001 URINALYSIS AUTO W/SCOPE: CPT | Performed by: EMERGENCY MEDICINE

## 2025-06-06 PROCEDURE — 83690 ASSAY OF LIPASE: CPT | Performed by: EMERGENCY MEDICINE

## 2025-06-06 PROCEDURE — 83605 ASSAY OF LACTIC ACID: CPT | Performed by: EMERGENCY MEDICINE

## 2025-06-06 PROCEDURE — 99285 EMERGENCY DEPT VISIT HI MDM: CPT

## 2025-06-06 PROCEDURE — 25510000001 IOPAMIDOL 61 % SOLUTION: Performed by: EMERGENCY MEDICINE

## 2025-06-06 PROCEDURE — 36415 COLL VENOUS BLD VENIPUNCTURE: CPT

## 2025-06-06 PROCEDURE — 93010 ELECTROCARDIOGRAM REPORT: CPT | Performed by: STUDENT IN AN ORGANIZED HEALTH CARE EDUCATION/TRAINING PROGRAM

## 2025-06-06 PROCEDURE — 74177 CT ABD & PELVIS W/CONTRAST: CPT

## 2025-06-06 PROCEDURE — 80053 COMPREHEN METABOLIC PANEL: CPT | Performed by: EMERGENCY MEDICINE

## 2025-06-06 PROCEDURE — 93005 ELECTROCARDIOGRAM TRACING: CPT | Performed by: HOSPITALIST

## 2025-06-06 RX ORDER — ACETAMINOPHEN 160 MG/5ML
650 SOLUTION ORAL EVERY 4 HOURS PRN
Status: DISCONTINUED | OUTPATIENT
Start: 2025-06-06 | End: 2025-06-09 | Stop reason: HOSPADM

## 2025-06-06 RX ORDER — TAMSULOSIN HYDROCHLORIDE 0.4 MG/1
0.4 CAPSULE ORAL EVERY OTHER DAY
Status: DISCONTINUED | OUTPATIENT
Start: 2025-06-06 | End: 2025-06-09 | Stop reason: HOSPADM

## 2025-06-06 RX ORDER — ACETAMINOPHEN 325 MG/1
650 TABLET ORAL EVERY 4 HOURS PRN
Status: DISCONTINUED | OUTPATIENT
Start: 2025-06-06 | End: 2025-06-09 | Stop reason: HOSPADM

## 2025-06-06 RX ORDER — IOPAMIDOL 612 MG/ML
100 INJECTION, SOLUTION INTRAVASCULAR
Status: COMPLETED | OUTPATIENT
Start: 2025-06-06 | End: 2025-06-06

## 2025-06-06 RX ORDER — AMOXICILLIN 250 MG
2 CAPSULE ORAL 2 TIMES DAILY PRN
Status: DISCONTINUED | OUTPATIENT
Start: 2025-06-06 | End: 2025-06-09 | Stop reason: HOSPADM

## 2025-06-06 RX ORDER — ROSUVASTATIN CALCIUM 20 MG/1
20 TABLET, COATED ORAL DAILY
Status: DISCONTINUED | OUTPATIENT
Start: 2025-06-06 | End: 2025-06-09 | Stop reason: HOSPADM

## 2025-06-06 RX ORDER — POLYETHYLENE GLYCOL 3350 17 G/17G
17 POWDER, FOR SOLUTION ORAL DAILY PRN
Status: DISCONTINUED | OUTPATIENT
Start: 2025-06-06 | End: 2025-06-09 | Stop reason: HOSPADM

## 2025-06-06 RX ORDER — LISINOPRIL 20 MG/1
40 TABLET ORAL DAILY
Status: DISCONTINUED | OUTPATIENT
Start: 2025-06-06 | End: 2025-06-09 | Stop reason: HOSPADM

## 2025-06-06 RX ORDER — AMLODIPINE BESYLATE 5 MG/1
5 TABLET ORAL DAILY
Status: DISCONTINUED | OUTPATIENT
Start: 2025-06-06 | End: 2025-06-09 | Stop reason: HOSPADM

## 2025-06-06 RX ORDER — SODIUM CHLORIDE 9 MG/ML
40 INJECTION, SOLUTION INTRAVENOUS AS NEEDED
Status: DISCONTINUED | OUTPATIENT
Start: 2025-06-06 | End: 2025-06-09 | Stop reason: HOSPADM

## 2025-06-06 RX ORDER — ONDANSETRON 2 MG/ML
4 INJECTION INTRAMUSCULAR; INTRAVENOUS EVERY 6 HOURS PRN
Status: DISCONTINUED | OUTPATIENT
Start: 2025-06-06 | End: 2025-06-09 | Stop reason: HOSPADM

## 2025-06-06 RX ORDER — OXYCODONE HYDROCHLORIDE 5 MG/1
5 TABLET ORAL EVERY 4 HOURS PRN
Refills: 0 | Status: DISCONTINUED | OUTPATIENT
Start: 2025-06-06 | End: 2025-06-09 | Stop reason: HOSPADM

## 2025-06-06 RX ORDER — POLYETHYLENE GLYCOL 3350 17 G/17G
17 POWDER, FOR SOLUTION ORAL DAILY
Status: DISCONTINUED | OUTPATIENT
Start: 2025-06-06 | End: 2025-06-09 | Stop reason: HOSPADM

## 2025-06-06 RX ORDER — SODIUM CHLORIDE 0.9 % (FLUSH) 0.9 %
10 SYRINGE (ML) INJECTION AS NEEDED
Status: DISCONTINUED | OUTPATIENT
Start: 2025-06-06 | End: 2025-06-09 | Stop reason: HOSPADM

## 2025-06-06 RX ORDER — TIMOLOL MALEATE 5 MG/ML
1 SOLUTION/ DROPS OPHTHALMIC 2 TIMES DAILY
Status: DISCONTINUED | OUTPATIENT
Start: 2025-06-06 | End: 2025-06-09 | Stop reason: HOSPADM

## 2025-06-06 RX ORDER — METOPROLOL TARTRATE 25 MG/1
25 TABLET, FILM COATED ORAL 2 TIMES DAILY
Status: DISCONTINUED | OUTPATIENT
Start: 2025-06-06 | End: 2025-06-09 | Stop reason: HOSPADM

## 2025-06-06 RX ORDER — FINASTERIDE 5 MG/1
5 TABLET, FILM COATED ORAL DAILY
Status: DISCONTINUED | OUTPATIENT
Start: 2025-06-06 | End: 2025-06-09 | Stop reason: HOSPADM

## 2025-06-06 RX ORDER — ACETAMINOPHEN 650 MG/1
650 SUPPOSITORY RECTAL EVERY 4 HOURS PRN
Status: DISCONTINUED | OUTPATIENT
Start: 2025-06-06 | End: 2025-06-09 | Stop reason: HOSPADM

## 2025-06-06 RX ORDER — PANTOPRAZOLE SODIUM 40 MG/1
40 TABLET, DELAYED RELEASE ORAL
Status: DISCONTINUED | OUTPATIENT
Start: 2025-06-06 | End: 2025-06-09 | Stop reason: HOSPADM

## 2025-06-06 RX ORDER — BISACODYL 10 MG
10 SUPPOSITORY, RECTAL RECTAL DAILY
Status: DISCONTINUED | OUTPATIENT
Start: 2025-06-06 | End: 2025-06-09 | Stop reason: HOSPADM

## 2025-06-06 RX ORDER — SODIUM CHLORIDE 0.9 % (FLUSH) 0.9 %
10 SYRINGE (ML) INJECTION EVERY 12 HOURS SCHEDULED
Status: DISCONTINUED | OUTPATIENT
Start: 2025-06-06 | End: 2025-06-09 | Stop reason: HOSPADM

## 2025-06-06 RX ORDER — BISACODYL 10 MG
10 SUPPOSITORY, RECTAL RECTAL DAILY PRN
Status: DISCONTINUED | OUTPATIENT
Start: 2025-06-06 | End: 2025-06-09 | Stop reason: HOSPADM

## 2025-06-06 RX ORDER — BISACODYL 5 MG/1
5 TABLET, DELAYED RELEASE ORAL DAILY PRN
Status: DISCONTINUED | OUTPATIENT
Start: 2025-06-06 | End: 2025-06-09 | Stop reason: HOSPADM

## 2025-06-06 RX ORDER — ONDANSETRON 4 MG/1
4 TABLET, ORALLY DISINTEGRATING ORAL EVERY 6 HOURS PRN
Status: DISCONTINUED | OUTPATIENT
Start: 2025-06-06 | End: 2025-06-09 | Stop reason: HOSPADM

## 2025-06-06 RX ADMIN — Medication 10 ML: at 13:02

## 2025-06-06 RX ADMIN — AMLODIPINE BESYLATE 5 MG: 5 TABLET ORAL at 12:43

## 2025-06-06 RX ADMIN — OXYCODONE HYDROCHLORIDE 5 MG: 5 TABLET ORAL at 15:08

## 2025-06-06 RX ADMIN — METOPROLOL TARTRATE 25 MG: 25 TABLET, FILM COATED ORAL at 12:44

## 2025-06-06 RX ADMIN — SODIUM CHLORIDE 1000 ML: 9 INJECTION, SOLUTION INTRAVENOUS at 10:24

## 2025-06-06 RX ADMIN — TIMOLOL MALEATE 1 DROP: 5 SOLUTION OPHTHALMIC at 20:46

## 2025-06-06 RX ADMIN — IOPAMIDOL 85 ML: 612 INJECTION, SOLUTION INTRAVENOUS at 10:03

## 2025-06-06 RX ADMIN — TAMSULOSIN HYDROCHLORIDE 0.4 MG: 0.4 CAPSULE ORAL at 12:43

## 2025-06-06 RX ADMIN — Medication 10 ML: at 20:49

## 2025-06-06 RX ADMIN — OXYCODONE HYDROCHLORIDE 5 MG: 5 TABLET ORAL at 20:46

## 2025-06-06 RX ADMIN — POLYETHYLENE GLYCOL 3350 17 G: 17 POWDER, FOR SOLUTION ORAL at 12:44

## 2025-06-06 RX ADMIN — METOPROLOL TARTRATE 25 MG: 25 TABLET, FILM COATED ORAL at 20:46

## 2025-06-06 RX ADMIN — BISACODYL 10 MG: 10 SUPPOSITORY RECTAL at 12:44

## 2025-06-06 NOTE — NURSING NOTE
Patient in error placed on telemetry. Patient had a 5.25 second pause. Informed MD of pause and EKG ordered. Patient removed from telemetry due to there not being an order for telemetry.

## 2025-06-06 NOTE — PROGRESS NOTES
Clinical Pharmacy Services: Medication History    Elia Walsh is a 65 y.o. male presenting to [facility] for   Chief Complaint   Patient presents with    Abdominal Pain       He  has a past medical history of Allergic, Anticoagulated, BPH (benign prostatic hyperplasia), Cataract, DJD (degenerative joint disease), Essential hypertension, benign, GERD (gastroesophageal reflux disease), Glaucoma, Grief reaction, Impaired glucose metabolism (2019), Marijuana use, continuous, Mixed hyperlipidemia, POLY (obstructive sleep apnea) (2020), PAF (paroxysmal atrial fibrillation), and Rectal bleeding.    Allergies as of 06/06/2025    (No Known Allergies)       Medication information was obtained from: Patient   Pharmacy and Phone Number:     Prior to Admission Medications       Prescriptions Last Dose Informant Patient Reported? Taking?    acetaminophen (TYLENOL) 500 MG tablet  Self Yes Yes    Take 1 tablet by mouth Every 6 (Six) Hours As Needed for Mild Pain.    amLODIPine (NORVASC) 5 MG tablet  Pharmacy No Yes    Take 1 tablet by mouth Daily.    apixaban (Eliquis) 5 MG tablet tablet  Pharmacy No Yes    Take 1 tablet by mouth Every 12 (Twelve) Hours.    finasteride (PROSCAR) 5 MG tablet  Pharmacy Yes Yes    Take 1 tablet by mouth Daily.    Hydrocort-Pramoxine, Perianal, (Proctofoam HC) 1-1 % rectal foam  Self No Yes    Insert 1 Application into the rectum 2 (Two) Times a Day.    icosapent ethyl (VASCEPA) 1 g capsule capsule  Pharmacy No Yes    TAKE 2 CAPSULES BY MOUTH TWICE A DAY WITH MEALS    lisinopril (PRINIVIL,ZESTRIL) 40 MG tablet  Pharmacy No Yes    TAKE ONE TABLET BY MOUTH DAILY    metoprolol tartrate (LOPRESSOR) 25 MG tablet  Pharmacy No Yes    Take 1 tablet by mouth 2 (Two) Times a Day.    Multiple Vitamins-Minerals (MULTIVITAL PO)  Self Yes Yes    Take 1 tablet by mouth Daily.    omeprazole (priLOSEC) 40 MG capsule  Pharmacy No Yes    TAKE 1 CAPSULE BY MOUTH EVERY OTHER DAY    Rocklatan 0.02-0.005 %  solution  Pharmacy Yes Yes    Administer 1 Application to both eyes Every Night.    rosuvastatin (CRESTOR) 20 MG tablet  Pharmacy No Yes    Take 1 tablet by mouth Daily.    tamsulosin (FLOMAX) 0.4 MG capsule 24 hr capsule  Self Yes Yes    Take 1 capsule by mouth Every Other Day.    timolol (TIMOPTIC) 0.5 % ophthalmic solution  Pharmacy Yes Yes    Administer 1 drop to both eyes 2 (Two) Times a Day.              Medication notes:     This medication list is complete to the best of my knowledge as of 6/6/2025    Please call if questions.    Arash Kraft  Medication History Technician  [phone]    6/6/2025 10:52 EDT

## 2025-06-06 NOTE — ED PROVIDER NOTES
" EMERGENCY DEPARTMENT ENCOUNTER    Room Number:  32/32  PCP: Vinh Rooney MD  Historian: Patient      HPI:  Chief Complaint: Abdominal pain  A complete HPI/ROS/PMH/PSH/SH/FH are unobtainable due to: None  Context: Elia Walsh is a 65 y.o. male who presents to the ED c/o fairly sudden onset of generalized abdominal discomfort with associated abdominal \"bloating\" that has been steadily worsening over the last couple of weeks.  He also reports significant decreases in bowel movements during this time as well.  He states that he did take a Dulcolax yesterday without any improvement in symptoms.  He reports the pain is more crampy in nature, moderate in intensity, and nonradiating.  He reports that the pain sometimes gets worse with food and not relieved by any known factor thus far.  He denies nausea/vomiting, back pain, chest pain, shortness of breath, or fever/chills.  He is on Eliquis daily due to a history of atrial fibrillation.            PAST MEDICAL HISTORY  Active Ambulatory Problems     Diagnosis Date Noted    Essential hypertension 02/02/2017    Mixed hyperlipidemia 02/02/2017    Gastroesophageal reflux disease with esophagitis 02/02/2017    Tubular adenoma of colon 03/13/2018    Perianal venous thrombosis 03/13/2018    Benign prostatic hyperplasia with lower urinary tract symptoms 08/09/2018    Paroxysmal atrial fibrillation 10/14/2020    POLY (obstructive sleep apnea) 12/17/2020    Glaucoma 03/11/2021    Chronic anticoagulation 05/27/2021    Impaired glucose metabolism 2019     Resolved Ambulatory Problems     Diagnosis Date Noted    Snoring 12/06/2020    Daytime somnolence 12/06/2020    Weakness 12/06/2020    Diaphoresis 12/06/2020    Palpitations 12/06/2020    Hypersomnia due to medical condition 01/03/2021     Past Medical History:   Diagnosis Date    Allergic     Anticoagulated     BPH (benign prostatic hyperplasia)     Cataract     DJD (degenerative joint disease)     Essential " hypertension, benign     GERD (gastroesophageal reflux disease)     Grief reaction     Marijuana use, continuous     PAF (paroxysmal atrial fibrillation)     Rectal bleeding          PAST SURGICAL HISTORY  Past Surgical History:   Procedure Laterality Date    COLONOSCOPY          COLONOSCOPY  2013    Jeffrey Ascencio MD    COLONOSCOPY  2006    Jeffrey Ascencio MD    ENDOSCOPY  2015    With biopsies, Al Haddad MD    ENDOSCOPY  2006    With biopsies, Jeffrey Ascencio MD    HEMORRHOIDECTOMY      HERNIA REPAIR      X2         FAMILY HISTORY  Family History   Problem Relation Age of Onset    Hypertension Mother 54    Aneurysm Father 70         SOCIAL HISTORY  Social History     Socioeconomic History    Marital status:     Number of children: 4   Tobacco Use    Smoking status: Former     Current packs/day: 0.00     Average packs/day: 1 pack/day for 25.0 years (25.0 ttl pk-yrs)     Types: Cigarettes     Start date: 1968     Quit date: 1993     Years since quittin.0    Smokeless tobacco: Never    Tobacco comments:     35 years ago   Vaping Use    Vaping status: Never Used   Substance and Sexual Activity    Alcohol use: Yes     Alcohol/week: 4.0 - 6.0 standard drinks of alcohol     Types: 4 - 6 Cans of beer per week     Comment: socially/4 or 5/week; Daily caffeine use    Drug use: Yes     Types: Marijuana     Comment: for glaucoma    Sexual activity: Not Currently         ALLERGIES  Patient has no known allergies.        REVIEW OF SYSTEMS  Review of Systems   Constitutional:  Negative for activity change, appetite change and fever.   HENT:  Negative for congestion and sore throat.    Eyes: Negative.    Respiratory:  Negative for cough and shortness of breath.    Cardiovascular:  Negative for chest pain and leg swelling.   Gastrointestinal:  Positive for abdominal pain and constipation. Negative for diarrhea and vomiting.   Endocrine: Negative.    Genitourinary:   Negative for decreased urine volume and dysuria.   Musculoskeletal:  Negative for neck pain.   Skin:  Negative for rash and wound.   Allergic/Immunologic: Negative.    Neurological:  Negative for weakness, numbness and headaches.   Hematological: Negative.    Psychiatric/Behavioral: Negative.     All other systems reviewed and are negative.         PHYSICAL EXAM  ED Triage Vitals   Temp Heart Rate Resp BP SpO2   06/06/25 0913 06/06/25 0913 06/06/25 0913 06/06/25 0915 06/06/25 0913   98.1 °F (36.7 °C) 86 12 166/100 92 %      Temp src Heart Rate Source Patient Position BP Location FiO2 (%)   06/06/25 0913 06/06/25 0913 06/06/25 0915 06/06/25 0915 --   Oral Monitor Sitting Right arm        Physical Exam  Constitutional:       General: He is not in acute distress.     Appearance: Normal appearance. He is not ill-appearing or toxic-appearing.   HENT:      Head: Normocephalic and atraumatic.   Eyes:      Extraocular Movements: Extraocular movements intact.      Pupils: Pupils are equal, round, and reactive to light.   Cardiovascular:      Rate and Rhythm: Normal rate and regular rhythm.      Heart sounds: No murmur heard.     No friction rub. No gallop.   Pulmonary:      Effort: Pulmonary effort is normal.      Breath sounds: Normal breath sounds.   Abdominal:      General: Abdomen is flat. There is no distension.      Palpations: Abdomen is soft.      Tenderness: There is generalized abdominal tenderness.   Musculoskeletal:         General: No swelling or tenderness. Normal range of motion.      Cervical back: Normal range of motion and neck supple.   Skin:     General: Skin is warm and dry.   Neurological:      General: No focal deficit present.      Mental Status: He is alert and oriented to person, place, and time.      Sensory: No sensory deficit.      Motor: No weakness.   Psychiatric:         Mood and Affect: Mood normal.         Behavior: Behavior normal.           Vital signs and nursing notes  reviewed.          LAB RESULTS  Recent Results (from the past 24 hours)   Comprehensive Metabolic Panel    Collection Time: 06/06/25  9:28 AM    Specimen: Blood   Result Value Ref Range    Glucose 133 (H) 65 - 99 mg/dL    BUN 13.0 8.0 - 23.0 mg/dL    Creatinine 0.79 0.76 - 1.27 mg/dL    Sodium 135 (L) 136 - 145 mmol/L    Potassium 4.2 3.5 - 5.2 mmol/L    Chloride 103 98 - 107 mmol/L    CO2 21.0 (L) 22.0 - 29.0 mmol/L    Calcium 9.7 8.6 - 10.5 mg/dL    Total Protein 8.1 6.0 - 8.5 g/dL    Albumin 4.6 3.5 - 5.2 g/dL    ALT (SGPT) 23 1 - 41 U/L    AST (SGOT) 25 1 - 40 U/L    Alkaline Phosphatase 108 39 - 117 U/L    Total Bilirubin 0.9 0.0 - 1.2 mg/dL    Globulin 3.5 gm/dL    A/G Ratio 1.3 g/dL    BUN/Creatinine Ratio 16.5 7.0 - 25.0    Anion Gap 11.0 5.0 - 15.0 mmol/L    eGFR 97.4 >60.0 mL/min/1.73   Protime-INR    Collection Time: 06/06/25  9:28 AM    Specimen: Blood   Result Value Ref Range    Protime 16.6 (H) 11.7 - 14.2 Seconds    INR 1.34 (H) 0.90 - 1.10   aPTT    Collection Time: 06/06/25  9:28 AM    Specimen: Blood   Result Value Ref Range    PTT 30.8 22.7 - 35.4 seconds   Lipase    Collection Time: 06/06/25  9:28 AM    Specimen: Blood   Result Value Ref Range    Lipase 2,943 (H) 13 - 60 U/L   Lactic Acid, Plasma    Collection Time: 06/06/25  9:28 AM    Specimen: Blood   Result Value Ref Range    Lactate 0.8 0.5 - 2.0 mmol/L   CBC Auto Differential    Collection Time: 06/06/25  9:28 AM    Specimen: Blood   Result Value Ref Range    WBC 13.97 (H) 3.40 - 10.80 10*3/mm3    RBC 4.88 4.14 - 5.80 10*6/mm3    Hemoglobin 16.2 13.0 - 17.7 g/dL    Hematocrit 47.0 37.5 - 51.0 %    MCV 96.3 79.0 - 97.0 fL    MCH 33.2 (H) 26.6 - 33.0 pg    MCHC 34.5 31.5 - 35.7 g/dL    RDW 13.2 12.3 - 15.4 %    RDW-SD 46.7 37.0 - 54.0 fl    MPV 9.9 6.0 - 12.0 fL    Platelets 219 140 - 450 10*3/mm3    Neutrophil % 77.9 (H) 42.7 - 76.0 %    Lymphocyte % 12.6 (L) 19.6 - 45.3 %    Monocyte % 7.9 5.0 - 12.0 %    Eosinophil % 1.0 0.3 - 6.2 %     Basophil % 0.4 0.0 - 1.5 %    Immature Grans % 0.2 0.0 - 0.5 %    Neutrophils, Absolute 10.87 (H) 1.70 - 7.00 10*3/mm3    Lymphocytes, Absolute 1.76 0.70 - 3.10 10*3/mm3    Monocytes, Absolute 1.11 (H) 0.10 - 0.90 10*3/mm3    Eosinophils, Absolute 0.14 0.00 - 0.40 10*3/mm3    Basophils, Absolute 0.06 0.00 - 0.20 10*3/mm3    Immature Grans, Absolute 0.03 0.00 - 0.05 10*3/mm3    nRBC 0.0 0.0 - 0.2 /100 WBC   Urinalysis With Microscopic If Indicated (No Culture) - Urine, Clean Catch    Collection Time: 06/06/25  9:35 AM    Specimen: Urine, Clean Catch   Result Value Ref Range    Color, UA Yellow Yellow, Straw    Appearance, UA Clear Clear    pH, UA 5.5 5.0 - 8.0    Specific Gravity, UA 1.007 1.005 - 1.030    Glucose, UA Negative Negative    Ketones, UA Trace (A) Negative    Bilirubin, UA Negative Negative    Blood, UA Small (1+) (A) Negative    Protein, UA Negative Negative    Leuk Esterase, UA Negative Negative    Nitrite, UA Negative Negative    Urobilinogen, UA 0.2 E.U./dL 0.2 - 1.0 E.U./dL   Urinalysis, Microscopic Only - Urine, Clean Catch    Collection Time: 06/06/25  9:35 AM    Specimen: Urine, Clean Catch   Result Value Ref Range    RBC, UA 0-2 None Seen, 0-2 /HPF    WBC, UA 0-2 None Seen, 0-2 /HPF    Bacteria, UA None Seen None Seen /HPF    Squamous Epithelial Cells, UA None Seen None Seen, 0-2 /HPF    Hyaline Casts, UA None Seen None Seen /LPF    Methodology Automated Microscopy        Ordered the above labs and reviewed the results.        RADIOLOGY  CT Abdomen Pelvis With Contrast  Result Date: 6/6/2025  CT ABDOMEN PELVIS W CONTRAST-  INDICATION: Generalized abdominal pain. Bloating.  COMPARISON: None  TECHNIQUE: Routine CT abdomen/pelvis with IV contrast. Coronal and sagittal reformats. Radiation dose reduction techniques were utilized, including automated exposure control and exposure modulation based on body size.  FINDINGS:  Lung bases: Respiratory motion artifact.  ABDOMEN: Small cyst in the inferior  right hepatic lobe. Normal gallbladder. No biliary ductal dilatation. Spleen is small in size. Calcifications in the spleen, may be sequela of old granulomatous infection.  Peripancreatic fat stranding. No pancreatic ductal dilatation. Suspicion for a slightly hypoattenuating mass in the pancreatic head on series 2, axial image 49, measuring approximately 2.5 x 2.3 cm, with enlarged peripancreatic lymph nodes present. For example, peripancreatic head/transverse mesocolon lymph node, series 2, axial mage 49, measures 1.4 cm. For example, suspect enlarged lymph node adjacent to the pancreatic uncinate 8, series 2, axial mage 53, measuring 1.6 cm. For example, periportal lymph node, series 2, axial mage 34, measuring 1.1 cm. For example, mesenteric root lymph node, series 2, axial mage 61, measuring 1.0 cm. Possible peripancreatic lymph node or pancreatic lobulation on series 2, axial mage 34 adjacent to the pancreatic body measuring 1.1 cm. Normal pancreatic enhancement. No peripancreatic fluid collection. Splenic vein is patent. No splenic artery pseudoaneurysm.  No adrenal nodules. Small renal cyst. Fat-containing mass seen in the inferior pole left kidney, series 2, axial mage 64, measures 1.3 cm, consistent with a renal angiomyolipoma, benign. No hydronephrosis.  Pelvis: Prostatomegaly, with the prostate measuring 5.1 cm in right to left dimension, with nodular mass effect on and uplifting of the bladder apex. Bladder wall thickening, with under distention. Right posterior lateral bladder wall diverticulum. No bladder calculus. Phleboliths in the pelvis.  Bowel: No obstruction. Normal appendix.  Abdominal wall: Rectus diastases. Tiny fat-containing umbilical hernia. Fat-containing right inguinal hernia.  Retroperitoneum: See above.  Vasculature: See above. Patent. No abdominal aortic aneurysm. Mild aortoiliac atherosclerotic calcification.  Osseous structures: No destructive osseous lesions.       1. Acute  interstitial edematous pancreatitis. 2. Mass seen in the pancreatic head with peripancreatic lymphadenopathy, concerning for pancreatic malignancy and stacey metastatic disease. GI evaluation. 3. Prostatomegaly. 4. Bladder wall thickening may be related to underdistention and/or chronic outlet obstruction.  Call Report: Dr. Lopez was notified by telephone of the above findings on June 6, 2025 at 10:30 a.m.  This report was finalized on 6/6/2025 10:33 AM by Dr. Al Rodriguez M.D on Workstation: [id number]        Ordered the above noted radiological studies. Reviewed by me in PACS.            PROCEDURES  Procedures            MEDICATIONS GIVEN IN ER  Medications   sodium chloride 0.9 % flush 10 mL (has no administration in time range)   sodium chloride 0.9 % bolus 1,000 mL (1,000 mL Intravenous New Bag 6/6/25 1024)   iopamidol (ISOVUE-300) 61 % injection 100 mL (85 mL Intravenous Given by Other 6/6/25 1003)                   MEDICAL DECISION MAKING, PROGRESS, and CONSULTS    All labs have been independently reviewed by me.  All radiology studies have been reviewed by me and I have also reviewed the radiology report.   EKGs independently viewed and interpreted by me.  Discussion below represents my analysis of pertinent findings related to patient's condition, differential diagnosis, treatment plan and final disposition.      Additional sources:  - Discussed/ obtained information from independent historians: History obtained from the patient himself at bedside.    - External (non-ED) record review: Upon medical records review, the patient was last seen and evaluated in the outpatient office of family medicine on 12/18/2020 for in follow-up for his known chronic hypertension and hyperlipidemia.    - Chronic or social conditions impacting care: Anticoagulation dependent history of atrial fibrillation, GERD    - Shared decision making: Admission decision based on shared conversations have between myself, the patient  and family at bedside, as well as Dr. Herrera with [facility].      Orders placed during this visit:  Orders Placed This Encounter   Procedures    CT Abdomen Pelvis With Contrast    Comprehensive Metabolic Panel    Protime-INR    aPTT    Lipase    Urinalysis With Microscopic If Indicated (No Culture) - Urine, Clean Catch    Lactic Acid, Plasma    CBC Auto Differential    Urinalysis, Microscopic Only - Urine, Clean Catch    CEA    Cancer Antigen 19-9    [facility] (on-call MD unless specified) Details    Insert Peripheral IV    Inpatient Admission    CBC & Differential             Differential diagnosis includes but is not limited to:    Small bowel obstruction, bowel perforation with intraperitoneal free air, acute pancreatitis, GERD/gastritis, peptic ulcer disease, acute diverticulitis, acute colitis, constipation, acute cholecystitis, or cholelithiasis      Independent interpretation of labs, radiology studies, and discussions with consultants:    Lab values independently interpreted by myself with my interpretation showing a marked elevated lipase at 2943 consistent with acute pancreatitis.      ED Course as of 06/06/25 1109   Fri Jun 06, 2025   1102 On reevaluation, I did inform the patient that his labs and abdominal CT were all consistent with acute pancreatitis.  He also was informed that he has a very concerning mass at the head of his pancreas that will certainly need further evaluation.  That is all obviously complicated as well by the fact of him being on Eliquis for atrial fibrillation.  He did not take his anticoagulation medicine this morning however and his last dose was last night.  We will admit him to the hospital today for further management and treatment.  He agrees with the plan and all questions answered. [BM]   1108 The patient's presentation, workup, as well as diagnosis and treatment plan was discussed at length with Dr. Herrera of [facility].  He agrees to admit the patient to the hospital today for further  management and treatment. [BM]      ED Course User Index  [BM] Yahir Lopez MD           DIAGNOSIS  Final diagnoses:   Acute pancreatitis without infection or necrosis, unspecified pancreatitis type   Pancreatic mass         DISPOSITION  ADMISSION    Discussed treatment plan and reason for admission with pt/family and admitting physician.  Pt/family voiced understanding of the plan for admission for further testing/treatment as needed.               Latest Documented Vital Signs:  As of 11:09 EDT  BP- 146/91 HR- 86 Temp- 98.1 °F (36.7 °C) (Oral) O2 sat- 92%              --    Please note that portions of this were completed with a voice recognition program.       Note Disclaimer: At [facility], we believe that sharing information builds trust and better relationships. You are receiving this note because you are receiving care at [facility] or recently visited. It is possible you will see health information before a provider has talked with you about it. This kind of information can be easy to misunderstand. To help you fully understand what it means for your health, we urge you to discuss this note with your provider.             Yahir Lopez MD  06/06/25 [id number]

## 2025-06-06 NOTE — H&P
Name: Elia Walsh ADMIT: 2025   : 1960  PCP: Vinh Rooney MD    MRN: [id number] LOS: 0 days   AGE/SEX: 65 y.o. male  ROOM:      Chief Complaint   Patient presents with    Abdominal Pain       Subjective   Patient is a 65 y.o. male who presents to [facility] with the above chief complaint.  His symptoms started a few weeks ago.  He noticed that he is had early satiety and abdominal bloating and distention.  He denies any reflux nausea or vomiting but over the last 48 hours has noticed increasing pain in his upper abdomen.  He is also not had many stools and feels constipated as well.  His pain and symptoms worsened enough today that he presented to the emergency room with his family.  He really did not want to come he was excited about other things this weekend.  He denies any fevers or chills no weight loss no night sweats or other symptoms.  Past medical history is pertinent for hypertension hyperlipidemia atrial fibrillation on chronic anticoagulation and obstructive sleep apnea.  In the emergency room his lipase was elevated to almost 3000.  CT scan of the abdomen and pelvis showed acute interstitial edematous pancreatitis but also noted with a pancreatic head lesion with significant lymphadenopathy concerning for malignancy and metastatic disease.  Given the findings of acute pancreatitis and possible underlying pancreatic malignancy he is being admitted to our service for stabilization further evaluation and workup    History of Present Illness    Past Medical History:   Diagnosis Date    Allergic     Anticoagulated     BPH (benign prostatic hyperplasia)     Cataract     DJD (degenerative joint disease)     Essential hypertension, benign     GERD (gastroesophageal reflux disease)     Glaucoma     Grief reaction     mxl family members lost in few yrs time    Impaired glucose metabolism 2019    Marijuana use, continuous     Mixed hyperlipidemia     POLY  (obstructive sleep apnea)     Home sleep study failed but pt does not want cpap. was reported as mild POLY with AHI 7.4 events per hour, possibly underestimated. The patient snored 14% of the total monitoring time.    PAF (paroxysmal atrial fibrillation)     Rectal bleeding      Past Surgical History:   Procedure Laterality Date    COLONOSCOPY          COLONOSCOPY  2013    Jeffrey Ascencio MD    COLONOSCOPY  2006    Jeffrey Ascencio MD    ENDOSCOPY  2015    With biopsies, Al Haddad MD    ENDOSCOPY  2006    With biopsies, Jeffrey Ascencio MD    HEMORRHOIDECTOMY      HERNIA REPAIR      X2     Family History   Problem Relation Age of Onset    Hypertension Mother 54    Aneurysm Father 70     Social History     Tobacco Use    Smoking status: Former     Current packs/day: 0.00     Average packs/day: 1 pack/day for 25.0 years (25.0 ttl pk-yrs)     Types: Cigarettes     Start date: 1968     Quit date: 1993     Years since quittin.0    Smokeless tobacco: Never    Tobacco comments:     35 years ago   Vaping Use    Vaping status: Never Used   Substance Use Topics    Alcohol use: Yes     Alcohol/week: 4.0 - 6.0 standard drinks of alcohol     Types: 4 - 6 Cans of beer per week     Comment: socially/4 or 5/week; Daily caffeine use    Drug use: Yes     Types: Marijuana     Comment: for glaucoma     (Not in a hospital admission)    Allergies:  Patient has no known allergies.    Review of Systems     Objective    Vital Signs  Temp:  [98.1 °F (36.7 °C)] 98.1 °F (36.7 °C)  Heart Rate:  [86] 86  Resp:  [12] 12  BP: (136-168)/() 136/84  SpO2:  [92 %] 92 %  on   ;   Device (Oxygen Therapy): room air  There is no height or weight on file to calculate BMI.    Physical Exam  He is awake alert sitting up on the stretcher in the emergency room oriented x 4 in no acute distress  Respirations are even and nonlabored clear to auscultation anteriorly  Irregularly irregular but rate  "controlled  Abdomen is soft distended minimally tender in the upper regions no Cotton sign no rebound or guarding  No peripheral edema    Results Review:   I reviewed the patient's new clinical results.  Results from last 7 days   Lab Units 06/06/25  0928   WBC 10*3/mm3 13.97*   HEMOGLOBIN g/dL 16.2   PLATELETS 10*3/mm3 219     Results from last 7 days   Lab Units 06/06/25  0928   SODIUM mmol/L 135*   POTASSIUM mmol/L 4.2   CHLORIDE mmol/L 103   CO2 mmol/L 21.0*   BUN mg/dL 13.0   CREATININE mg/dL 0.79   GLUCOSE mg/dL 133*   ALBUMIN g/dL 4.6   BILIRUBIN mg/dL 0.9   ALK PHOS U/L 108   AST (SGOT) U/L 25   ALT (SGPT) U/L 23   CrCl cannot be calculated (Unknown ideal weight.).  Results from last 7 days   Lab Units 06/06/25  0928   INR  1.34*         Invalid input(s): \"LDLCALC\"  Results from last 7 days   Lab Units 06/06/25  0935   NITRITE UA  Negative   WBC UA /HPF 0-2   BACTERIA UA /HPF None Seen   SQUAM EPITHEL UA /HPF None Seen     CT Abdomen Pelvis With Contrast   Final Result       1. Acute interstitial edematous pancreatitis.   2. Mass seen in the pancreatic head with peripancreatic lymphadenopathy,   concerning for pancreatic malignancy and stacey metastatic disease. GI   evaluation.   3. Prostatomegaly.   4. Bladder wall thickening may be related to underdistention and/or   chronic outlet obstruction.       Call Report: Dr. Lopez was notified by telephone of the above findings   on June 6, 2025 at 10:30 a.m.       This report was finalized on 6/6/2025 10:33 AM by Dr. Al Rodriguez M.D on Workstation: [id number]            Assessment & Plan       Pancreatitis, acute    Essential hypertension    Mixed hyperlipidemia    Benign prostatic hyperplasia with lower urinary tract symptoms    Paroxysmal atrial fibrillation    POLY (obstructive sleep apnea)    Chronic anticoagulation    Abdominal pain      Assessment & Plan  This is a pleasant 65-year-old gentleman with a history of hypertension hyperlipidemia " paroxysmal A-fib and obstructive sleep apnea who presents to the hospital with upper abdominal pain bloating and early satiety and is found to have acute pancreatitis and a pancreatic mass  He is not having any nausea and vomiting and his pain is fairly controlled.  Will allow clear liquid diet and trend his lipase.  I have sent off cancer markers and we will follow those results.  Placed on IV fluids and hydrate  Pain medications ordered  Bowel regimen ordered to treat constipation  GI consulted for assistance with pancreatitis and ultimately will require endoscopic ultrasound and biopsy of this lesion to determine the etiology.  Holding Eliquis for potential biopsy while inpatient he already took it this morning will initiate pharmacologic DVT prophylaxis starting tomorrow  Full code    I discussed the patients findings and my recommendations with patient, family, and nursing staff.          Ronnie Herrera MD  [facility]  06/06/25  11:49 EDT

## 2025-06-06 NOTE — ED NOTES
Encounter Date: 9/9/2020       History     Chief Complaint   Patient presents with    COVID-19 Concerns     13-year-old female recently returned from a trip with her mother and her aunt.  Both her mother and her on have tested positive for coronavirus.  The patient comes in today concerned that she may have it.  She does not have any symptoms. No fever, No cough/URI, No N/V/D, No ST.      ILLNESS: none, ALLERGIES: none, SURGERIES: none, HOSPITALIZATIONS: none, MEDICATIONS: none, Immunizations: UTD.      The history is provided by the patient and a relative (Older sister).     Review of patient's allergies indicates:  No Known Allergies  History reviewed. No pertinent past medical history.  History reviewed. No pertinent surgical history.  History reviewed. No pertinent family history.  Social History     Tobacco Use    Smoking status: Never Smoker   Substance Use Topics    Alcohol use: Not on file    Drug use: Not on file     Review of Systems   Constitutional: Negative for fever.   HENT: Negative for congestion and rhinorrhea.    Eyes: Negative for visual disturbance.   Respiratory: Negative for cough.    Gastrointestinal: Negative for diarrhea and vomiting.   Genitourinary: Negative for decreased urine volume.   Musculoskeletal: Negative for gait problem.   Skin: Negative for rash.   Allergic/Immunologic: Negative for immunocompromised state.   Neurological: Negative for seizures.   Hematological: Does not bruise/bleed easily.       Physical Exam     Initial Vitals [09/09/20 1444]   BP Pulse Resp Temp SpO2   138/80 105 18 98.2 °F (36.8 °C) 99 %      MAP       --         Physical Exam    Nursing note and vitals reviewed.  Constitutional: She appears well-developed and well-nourished. No distress.   HENT:   Right Ear: External ear normal.   Left Ear: External ear normal.   Mouth/Throat: Oropharynx is clear and moist. No oropharyngeal exudate.   Eyes: Conjunctivae are normal.   Neck: Neck supple.  Patient c/o abd pain for 2-3 weeks. He also c/o constipation.      Cardiovascular: Normal rate, regular rhythm and normal heart sounds. Exam reveals no gallop and no friction rub.    No murmur heard.  Pulmonary/Chest: Breath sounds normal. No respiratory distress.   Abdominal: Soft. She exhibits no distension and no mass. There is no abdominal tenderness.   No HSM   Musculoskeletal: Normal range of motion. No edema.   Lymphadenopathy:     She has no cervical adenopathy.   Neurological: She is alert and oriented to person, place, and time. She has normal strength.   Skin: Skin is warm and dry. No rash noted.         ED Course   Procedures  Labs Reviewed - No data to display       Imaging Results    None          Medical Decision Making:   History:   I obtained history from: someone other than patient.  Old Medical Records: I decided to obtain old medical records.  Initial Assessment:   13-year-old female with history of close contact to patient with coronavirus.  Asymptomatic.  Differential Diagnosis:   Worried well  Coronavirus exposure  Coronavirus infection  ED Management:  Family unremarkable.  Explained to patient that testing of asymptomatic individuals is not indicated.  It could provide a false negative result.  What is recommended is 14 days quarantine whether she develops symptoms or not.  Patient expressed understanding.  I provided her with information from Community testing sites if she still wishes to be tested.                             Clinical Impression:       ICD-10-CM ICD-9-CM   1. Close Exposure to [facility] Coronavirus  Z20.828          Disposition:   Disposition: Discharged  Condition: Stable  Coronavirus exposure.  Asymptomatic.  Testing not indicated.  Provided information on community testing sites if patient still wishes to be tested despite recommendation.     ED Disposition Condition    Discharge Good        ED Prescriptions     None        Follow-up Information    None                                      Brandon Alonso MD  09/09/20 6058

## 2025-06-06 NOTE — Clinical Note
Level of Care: Telemetry [5]   Diagnosis: Pancreatitis, acute [309823]   Admitting Physician: MEGAN INFANTE [[id number]]   Attending Physician: MEGAN INFANTE [[id number]]   Certification: I Certify That Inpatient Hospital Services Are Medically Necessary For Greater Than 2 Midnights

## 2025-06-06 NOTE — CONSULTS
GI CONSULT  NOTE:    Referring Provider: Dr. Herrera    Chief complaint: Abdominal pain    Subjective .     History of present illness: 65-year-old male with remote history of tobacco use who presented with abdominal pain.  Started a couple of weeks ago and has been intermittent.  Worse after eating.  Has significant bloating and distention.  No nausea or vomiting.  No weight loss.  No family history of pancreatic cancer.  No fever or chills.      Endo History:  EGD and colonoscopy by Dr. Haddad    Past Medical History:  Past Medical History:   Diagnosis Date    Allergic     Anticoagulated     BPH (benign prostatic hyperplasia)     Cataract     DJD (degenerative joint disease)     Essential hypertension, benign     GERD (gastroesophageal reflux disease)     Glaucoma     Grief reaction     mxl family members lost in few yrs time    Impaired glucose metabolism 2019    Marijuana use, continuous     Mixed hyperlipidemia     POLY (obstructive sleep apnea)     Home sleep study failed but pt does not want cpap. was reported as mild POLY with AHI 7.4 events per hour, possibly underestimated. The patient snored 14% of the total monitoring time.    PAF (paroxysmal atrial fibrillation)     Rectal bleeding        Past Surgical History:  Past Surgical History:   Procedure Laterality Date    COLONOSCOPY          COLONOSCOPY  2013    Jeffrey Ascencio MD    COLONOSCOPY  2006    Jeffrey Ascencio MD    ENDOSCOPY  2015    With biopsies, Al Haddad MD    ENDOSCOPY  2006    With biopsies, Jeffrey Ascencio MD    HEMORRHOIDECTOMY      HERNIA REPAIR      X2       Social History:  Social History     Tobacco Use    Smoking status: Former     Current packs/day: 0.00     Average packs/day: 1 pack/day for 25.0 years (25.0 ttl pk-yrs)     Types: Cigarettes     Start date: 1968     Quit date: 1993     Years since quittin.0    Smokeless tobacco: Never    Tobacco comments:     35 years ago    Vaping Use    Vaping status: Never Used   Substance Use Topics    Alcohol use: Yes     Alcohol/week: 4.0 - 6.0 standard drinks of alcohol     Types: 4 - 6 Cans of beer per week     Comment: socially/4 or 5/week; Daily caffeine use    Drug use: Yes     Types: Marijuana     Comment: for glaucoma       Family History:  Family History   Problem Relation Age of Onset    Hypertension Mother 54    Aneurysm Father 70       Medications:  Medications Prior to Admission   Medication Sig Dispense Refill Last Dose/Taking    acetaminophen (TYLENOL) 500 MG tablet Take 1 tablet by mouth Every 6 (Six) Hours As Needed for Mild Pain.   Taking As Needed    amLODIPine (NORVASC) 5 MG tablet Take 1 tablet by mouth Daily. 90 tablet 1 Taking    apixaban (Eliquis) 5 MG tablet tablet Take 1 tablet by mouth Every 12 (Twelve) Hours. 180 tablet 1 Taking    finasteride (PROSCAR) 5 MG tablet Take 1 tablet by mouth Daily.   Taking    Hydrocort-Pramoxine, Perianal, (Proctofoam HC) 1-1 % rectal foam Insert 1 Application into the rectum 2 (Two) Times a Day. 10 g 2 Taking    icosapent ethyl (VASCEPA) 1 g capsule capsule TAKE 2 CAPSULES BY MOUTH TWICE A DAY WITH MEALS 360 capsule 1 Taking    lisinopril (PRINIVIL,ZESTRIL) 40 MG tablet TAKE ONE TABLET BY MOUTH DAILY 90 tablet 1 Taking    metoprolol tartrate (LOPRESSOR) 25 MG tablet Take 1 tablet by mouth 2 (Two) Times a Day. 180 tablet 1 Taking    Multiple Vitamins-Minerals (MULTIVITAL PO) Take 1 tablet by mouth Daily.   Taking    omeprazole (priLOSEC) 40 MG capsule TAKE 1 CAPSULE BY MOUTH EVERY OTHER DAY 45 capsule 0 Taking    Rocklatan 0.02-0.005 % solution Administer 1 Application to both eyes Every Night.   Taking    rosuvastatin (CRESTOR) 20 MG tablet Take 1 tablet by mouth Daily. 90 tablet 1 Taking    tamsulosin (FLOMAX) 0.4 MG capsule 24 hr capsule Take 1 capsule by mouth Every Other Day.   Taking    timolol (TIMOPTIC) 0.5 % ophthalmic solution Administer 1 drop to both eyes 2 (Two) Times a Day.   " Taking       Scheduled Meds:amLODIPine, 5 mg, Oral, Daily  bisacodyl, 10 mg, Rectal, Daily  finasteride, 5 mg, Oral, Daily  lisinopril, 40 mg, Oral, Daily  metoprolol tartrate, 25 mg, Oral, BID  pantoprazole, 40 mg, Oral, Q AM  polyethylene glycol, 17 g, Oral, Daily  rosuvastatin, 20 mg, Oral, Daily  sodium chloride, 10 mL, Intravenous, Q12H  tamsulosin, 0.4 mg, Oral, Every Other Day  timolol, 1 drop, Both Eyes, BID      Continuous Infusions:   PRN Meds:.  acetaminophen **OR** acetaminophen **OR** acetaminophen    senna-docusate sodium **AND** polyethylene glycol **AND** bisacodyl **AND** bisacodyl    Calcium Replacement - Follow Nurse / BPA Driven Protocol    Magnesium Low Dose Replacement - Follow Nurse / BPA Driven Protocol    ondansetron ODT **OR** ondansetron    oxyCODONE    Phosphorus Replacement - Follow Nurse / BPA Driven Protocol    Potassium Replacement - Follow Nurse / BPA Driven Protocol    [COMPLETED] Insert Peripheral IV **AND** sodium chloride    sodium chloride    sodium chloride    ALLERGIES:  Patient has no known allergies.    ROS:  Review of Systems   Gastrointestinal:  Positive for abdominal distention and abdominal pain.       Objective     Vital Signs:   Vitals:    06/06/25 1031 06/06/25 1331 06/06/25 1430 06/06/25 1554   BP: 136/84 153/84 140/78    BP Location:       Patient Position:       Pulse:  56 62    Resp:       Temp:       TempSrc:       SpO2:  98% 98%    Weight:    87.6 kg (193 lb 2 oz)   Height:    185.4 cm (73\")         Physical Exam:      General Appearance:    Awake and alert, in no acute distress   Head:    Normocephalic, without obvious abnormality, atraumatic   Eyes:            Conjunctivae normal, anicteric sclerae   Ears:    Ears appear intact with no abnormalities noted   Throat:   No oral lesions, no thrush, oral mucosa moist   Neck:   No adenopathy, supple, no thyromegaly, no JVD   Lungs:     Respirations regular, even and unlabored   Chest Wall:    No abnormalities " observed   Abdomen:     Soft, only minimally tender in midepigastric with no rebound or guarding   Rectal:     Deferred   Extremities:   Moves all extremities well, no edema, no cyanosis, no         redness   Pulses:   Pulses palpable and equal bilaterally   Skin:   No bleeding, bruising or rash, no jaundice   Lymph nodes:   No palpable adenopathy   Neurologic:   Cranial nerves 2 - 12 grossly intact, no asterixis, sensation intact       Results Review:   I reviewed the patient's labs and imaging.  Lab Results (last 24 hours)       Procedure Component Value Units Date/Time    CEA [[id number]] Collected: 06/06/25 1239    Specimen: Blood Updated: 06/06/25 1336     CEA 1.32 ng/mL     Narrative:      CEA Reference Range:    Non Smokers:   Less than 3 ng/mL  Smokers:       Less than 5 ng/mL  Results may be falsely decreased if patient taking Biotin.    Testing Method: [employer] Electrochemiluminescence Immunoassay(ECLIA)  Values obtained with different assay methods or kits cannot be used interchangeably.    Cancer Antigen 19-9 [[id number]]  (Normal) Collected: 06/06/25 1239    Specimen: Blood Updated: 06/06/25 1328     CA 19-9 32.0 U/mL     Narrative:      Results may be falsely decreased if patient taking Biotin.    Testing Method: [employer] Electrochemiluminescence Immunoassay(ECLIA)  Values obtained with different assay methods or kits cannot be used interchangeably.    Lipase [[id number]]  (Abnormal) Collected: 06/06/25 0928    Specimen: Blood Updated: 06/06/25 1004     Lipase 2,943 U/L     Comprehensive Metabolic Panel [[id number]]  (Abnormal) Collected: 06/06/25 0928    Specimen: Blood Updated: 06/06/25 0956     Glucose 133 mg/dL      BUN 13.0 mg/dL      Creatinine 0.79 mg/dL      Sodium 135 mmol/L      Potassium 4.2 mmol/L      Chloride 103 mmol/L      CO2 21.0 mmol/L      Calcium 9.7 mg/dL      Total Protein 8.1 g/dL      Albumin 4.6 g/dL      ALT (SGPT) 23 U/L      AST (SGOT) 25 U/L      Alkaline  Phosphatase 108 U/L      Total Bilirubin 0.9 mg/dL      Globulin 3.5 gm/dL      A/G Ratio 1.3 g/dL      BUN/Creatinine Ratio 16.5     Anion Gap 11.0 mmol/L      eGFR 97.4 mL/min/1.73     Narrative:      GFR Categories in Chronic Kidney Disease (CKD)              GFR Category          GFR (mL/min/1.73)    Interpretation  G1                    90 or greater        Normal or high (1)  G2                    60-89                Mild decrease (1)  G3a                   45-59                Mild to moderate decrease  G3b                   30-44                Moderate to severe decrease  G4                    15-29                Severe decrease  G5                    14 or less           Kidney failure    (1)In the absence of evidence of kidney disease, neither GFR category G1 or G2 fulfill the criteria for CKD.    eGFR calculation 2021 CKD-EPI creatinine equation, which does not include race as a factor    Lactic Acid, Plasma [[id number]]  (Normal) Collected: 06/06/25 0928    Specimen: Blood Updated: 06/06/25 0953     Lactate 0.8 mmol/L     Protime-INR [[id number]]  (Abnormal) Collected: 06/06/25 0928    Specimen: Blood Updated: 06/06/25 0952     Protime 16.6 Seconds      INR 1.34    aPTT [[id number]]  (Normal) Collected: 06/06/25 0928    Specimen: Blood Updated: 06/06/25 0952     PTT 30.8 seconds     Urinalysis With Microscopic If Indicated (No Culture) - Urine, Clean Catch [[id number]]  (Abnormal) Collected: 06/06/25 0935    Specimen: Urine, Clean Catch Updated: 06/06/25 0945     Color, UA Yellow     Appearance, UA Clear     pH, UA 5.5     Specific Gravity, UA 1.007     Glucose, UA Negative     Ketones, UA Trace     Bilirubin, UA Negative     Blood, UA Small (1+)     Protein, UA Negative     Leuk Esterase, UA Negative     Nitrite, UA Negative     Urobilinogen, UA 0.2 E.U./dL    Urinalysis, Microscopic Only - Urine, Clean Catch [[id number]] Collected: 06/06/25 0935    Specimen: Urine, Clean Catch Updated: 06/06/25 0945      RBC, UA 0-2 /HPF      WBC, UA 0-2 /HPF      Bacteria, UA None Seen /HPF      Squamous Epithelial Cells, UA None Seen /HPF      Hyaline Casts, UA None Seen /LPF      Methodology Automated Microscopy    CBC & Differential [[id number]]  (Abnormal) Collected: 06/06/25 0928    Specimen: Blood Updated: 06/06/25 0935    Narrative:      The following orders were created for panel order CBC & Differential.  Procedure                               Abnormality         Status                     ---------                               -----------         ------                     CBC Auto Differential[[id number]]        Abnormal            Final result                 Please view results for these tests on the individual orders.    CBC Auto Differential [[id number]]  (Abnormal) Collected: 06/06/25 0928    Specimen: Blood Updated: 06/06/25 0935     WBC 13.97 10*3/mm3      RBC 4.88 10*6/mm3      Hemoglobin 16.2 g/dL      Hematocrit 47.0 %      MCV 96.3 fL      MCH 33.2 pg      MCHC 34.5 g/dL      RDW 13.2 %      RDW-SD 46.7 fl      MPV 9.9 fL      Platelets 219 10*3/mm3      Neutrophil % 77.9 %      Lymphocyte % 12.6 %      Monocyte % 7.9 %      Eosinophil % 1.0 %      Basophil % 0.4 %      Immature Grans % 0.2 %      Neutrophils, Absolute 10.87 10*3/mm3      Lymphocytes, Absolute 1.76 10*3/mm3      Monocytes, Absolute 1.11 10*3/mm3      Eosinophils, Absolute 0.14 10*3/mm3      Basophils, Absolute 0.06 10*3/mm3      Immature Grans, Absolute 0.03 10*3/mm3      nRBC 0.0 /100 WBC             Imaging Results (Last 24 Hours)       Procedure Component Value Units Date/Time    CT Abdomen Pelvis With Contrast [[id number]] Collected: 06/06/25 1017     Updated: 06/06/25 1036    Narrative:      CT ABDOMEN PELVIS W CONTRAST-     INDICATION: Generalized abdominal pain. Bloating.     COMPARISON: None     TECHNIQUE:  Routine CT abdomen/pelvis with IV contrast. Coronal and sagittal  reformats. Radiation dose reduction techniques were utilized,  including  automated exposure control and exposure modulation based on body size.     FINDINGS:      Lung bases: Respiratory motion artifact.     ABDOMEN: Small cyst in the inferior right hepatic lobe. Normal  gallbladder. No biliary ductal dilatation. Spleen is small in size.  Calcifications in the spleen, may be sequela of old granulomatous  infection.      Peripancreatic fat stranding. No pancreatic ductal dilatation. Suspicion  for a slightly hypoattenuating mass in the pancreatic head on series 2,  axial image 49, measuring approximately 2.5 x 2.3 cm, with enlarged  peripancreatic lymph nodes present. For example, peripancreatic  head/transverse mesocolon lymph node, series 2, axial mage 49, measures  1.4 cm. For example, suspect enlarged lymph node adjacent to the  pancreatic uncinate 8, series 2, axial mage 53, measuring 1.6 cm. For  example, periportal lymph node, series 2, axial mage 34, measuring 1.1  cm. For example, mesenteric root lymph node, series 2, axial mage 61,  measuring 1.0 cm. Possible peripancreatic lymph node or pancreatic  lobulation on series 2, axial mage 34 adjacent to the pancreatic body  measuring 1.1 cm. Normal pancreatic enhancement. No peripancreatic fluid  collection. Splenic vein is patent. No splenic artery pseudoaneurysm.     No adrenal nodules. Small renal cyst. Fat-containing mass seen in the  inferior pole left kidney, series 2, axial mage 64, measures 1.3 cm,  consistent with a renal angiomyolipoma, benign. No hydronephrosis.     Pelvis: Prostatomegaly, with the prostate measuring 5.1 cm in right to  left dimension, with nodular mass effect on and uplifting of the bladder  apex. Bladder wall thickening, with under distention. Right posterior  lateral bladder wall diverticulum. No bladder calculus. Phleboliths in  the pelvis.     Bowel: No obstruction. Normal appendix.     Abdominal wall: Rectus diastases. Tiny fat-containing umbilical hernia.  Fat-containing right inguinal  hernia.     Retroperitoneum: See above.     Vasculature: See above. Patent. No abdominal aortic aneurysm. Mild  aortoiliac atherosclerotic calcification.     Osseous structures: No destructive osseous lesions.       Impression:         1. Acute interstitial edematous pancreatitis.  2. Mass seen in the pancreatic head with peripancreatic lymphadenopathy,  concerning for pancreatic malignancy and stacey metastatic disease. GI  evaluation.  3. Prostatomegaly.  4. Bladder wall thickening may be related to underdistention and/or  chronic outlet obstruction.     Call Report: Dr. Lopez was notified by telephone of the above findings  on June 6, 2025 at 10:30 a.m.     This report was finalized on 6/6/2025 10:33 AM by Dr. Al Rodriguez M.D on Workstation: [id number]                  ASSESSMENT:  Acute pancreatitis  Pancreatic mass  Epigastric pain    Principal Problem:    Pancreatitis, acute  Active Problems:    Essential hypertension    Mixed hyperlipidemia    Benign prostatic hyperplasia with lower urinary tract symptoms    Paroxysmal atrial fibrillation    POLY (obstructive sleep apnea)    Chronic anticoagulation    Abdominal pain       Impression: This is a 65-year-old male with a remote history of tobacco abuse but no family history of pancreatic cancer presenting with a few week history of intermittent abdominal pain with elevated lipase and pancreatitis on imaging with concern for a small 2.3 cm pancreatic head mass.  CA 19-9 is normal and patient has no weight loss    PLAN:  -CRP daily  - Lipase in the morning  - Lactate Ringer's  - Patient is already drinking clear liquids and pain has improved with oxycodone.  We will continue clear liquids  - Will need endoscopic ultrasound with fine-needle biopsy of the pancreatic mass.  This is not emergent and will not be done over the weekend.  If he is here on Monday, we could do it then, or he can be discharged and we can proceed with biopsy next week as an outpatient.   Sometimes it is better to allow the acute pancreatitis to calm down as this often looks like a mass on endoscopic ultrasound.  -I will check on him again in the morning and ensure that his pain has mostly resolved.    I discussed the patient's findings and my recommendations with the patient.  Graham Luke MD  06/06/25  18:12 EDT

## 2025-06-06 NOTE — ED NOTES
Nursing report ED to floor  [facility]  65 y.o.  male    HPI :  HPI  Stated Reason for Visit: Abd pain  History Obtained From: patient  Duration (Days): 3    Chief Complaint  Chief Complaint   Patient presents with    Abdominal Pain       Admitting doctor:   Ronnie Herrera MD    Admitting diagnosis:   The primary encounter diagnosis was Acute pancreatitis without infection or necrosis, unspecified pancreatitis type. A diagnosis of Pancreatic mass was also pertinent to this visit.    Code status:   Current Code Status       Date Active Code Status Order ID Comments User Context       6/6/2025 1148 CPR (Attempt to Resuscitate) [id number]  Ronnie Herrera MD ED        Question Answer    Code Status (Patient has no pulse and is not breathing) CPR (Attempt to Resuscitate)    Medical Interventions (Patient has pulse or is breathing) Full Support    Level Of Support Discussed With Patient                    Allergies:   Patient has no known allergies.    Isolation:   No active isolations    Intake and Output  No intake or output data in the 24 hours ending 06/06/25 1354    Weight:   There were no vitals filed for this visit.    Most recent vitals:   Vitals:    06/06/25 0915 06/06/25 0935 06/06/25 1011 06/06/25 1031   BP: 166/100 146/91 168/86 136/84   BP Location: Right arm      Patient Position: Sitting      Pulse:       Resp:       Temp:       TempSrc:       SpO2:           Active LDAs/IV Access:   Lines, Drains & Airways       Active LDAs       Name Placement date Placement time Site Days    Peripheral IV 06/06/25 0931 20 G Right Antecubital 06/06/25  0931  Antecubital  less than 1                    Labs (abnormal labs have a star):   Labs Reviewed   COMPREHENSIVE METABOLIC PANEL - Abnormal; Notable for the following components:       Result Value    Glucose 133 (*)     Sodium 135 (*)     CO2 21.0 (*)     All other components within normal limits    Narrative:     GFR Categories in Chronic Kidney Disease  (CKD)              GFR Category          GFR (mL/min/1.73)    Interpretation  G1                    90 or greater        Normal or high (1)  G2                    60-89                Mild decrease (1)  G3a                   45-59                Mild to moderate decrease  G3b                   30-44                Moderate to severe decrease  G4                    15-29                Severe decrease  G5                    14 or less           Kidney failure    (1)In the absence of evidence of kidney disease, neither GFR category G1 or G2 fulfill the criteria for CKD.    eGFR calculation 2021 CKD-EPI creatinine equation, which does not include race as a factor   PROTIME-INR - Abnormal; Notable for the following components:    Protime 16.6 (*)     INR 1.34 (*)     All other components within normal limits   LIPASE - Abnormal; Notable for the following components:    Lipase 2,943 (*)     All other components within normal limits   URINALYSIS W/ MICROSCOPIC IF INDICATED (NO CULTURE) - Abnormal; Notable for the following components:    Ketones, UA Trace (*)     Blood, UA Small (1+) (*)     All other components within normal limits   CBC WITH AUTO DIFFERENTIAL - Abnormal; Notable for the following components:    WBC 13.97 (*)     MCH 33.2 (*)     Neutrophil % 77.9 (*)     Lymphocyte % 12.6 (*)     Neutrophils, Absolute 10.87 (*)     Monocytes, Absolute 1.11 (*)     All other components within normal limits   APTT - Normal   LACTIC ACID, PLASMA - Normal   CANCER ANTIGEN 19-9 - Normal    Narrative:     Results may be falsely decreased if patient taking Biotin.    Testing Method: [employer] Electrochemiluminescence Immunoassay(ECLIA)  Values obtained with different assay methods or kits cannot be used interchangeably.   URINALYSIS, MICROSCOPIC ONLY   CEA    Narrative:     CEA Reference Range:    Non Smokers:   Less than 3 ng/mL  Smokers:       Less than 5 ng/mL  Results may be falsely decreased if patient taking  Biotin.    Testing Method: [employer] Electrochemiluminescence Immunoassay(ECLIA)  Values obtained with different assay methods or kits cannot be used interchangeably.   CBC AND DIFFERENTIAL    Narrative:     The following orders were created for panel order CBC & Differential.  Procedure                               Abnormality         Status                     ---------                               -----------         ------                     CBC Auto Differential[[id number]]        Abnormal            Final result                 Please view results for these tests on the individual orders.       EKG:   No orders to display       Meds given in ED:   Medications   sodium chloride 0.9 % flush 10 mL (has no administration in time range)   amLODIPine (NORVASC) tablet 5 mg (5 mg Oral Given 6/6/25 1243)   finasteride (PROSCAR) tablet 5 mg (5 mg Oral Not Given 6/6/25 1300)   lisinopril (PRINIVIL,ZESTRIL) tablet 40 mg (40 mg Oral Not Given 6/6/25 1301)   metoprolol tartrate (LOPRESSOR) tablet 25 mg (25 mg Oral Given 6/6/25 1244)   pantoprazole (PROTONIX) EC tablet 40 mg (40 mg Oral Not Given 6/6/25 1258)   rosuvastatin (CRESTOR) tablet 20 mg (20 mg Oral Not Given 6/6/25 1259)   tamsulosin (FLOMAX) 24 hr capsule 0.4 mg (0.4 mg Oral Given 6/6/25 1243)   timolol (TIMOPTIC) 0.5 % ophthalmic solution 1 drop (has no administration in time range)   sodium chloride 0.9 % flush 10 mL (10 mL Intravenous Given 6/6/25 1302)   sodium chloride 0.9 % flush 10 mL (has no administration in time range)   sodium chloride 0.9 % infusion 40 mL (has no administration in time range)   Potassium Replacement - Follow Nurse / BPA Driven Protocol (has no administration in time range)   Magnesium Low Dose Replacement - Follow Nurse / BPA Driven Protocol (has no administration in time range)   Phosphorus Replacement - Follow Nurse / BPA Driven Protocol (has no administration in time range)   Calcium Replacement - Follow Nurse / BPA Driven  Protocol (has no administration in time range)   sennosides-docusate (PERICOLACE) 8.6-50 MG per tablet 2 tablet (has no administration in time range)     And   polyethylene glycol (MIRALAX) packet 17 g (has no administration in time range)     And   bisacodyl (DULCOLAX) EC tablet 5 mg (has no administration in time range)     And   bisacodyl (DULCOLAX) suppository 10 mg (has no administration in time range)   acetaminophen (TYLENOL) tablet 650 mg (has no administration in time range)     Or   acetaminophen (TYLENOL) 160 MG/5ML oral solution 650 mg (has no administration in time range)     Or   acetaminophen (TYLENOL) suppository 650 mg (has no administration in time range)   oxyCODONE (ROXICODONE) immediate release tablet 5 mg (has no administration in time range)   polyethylene glycol (MIRALAX) packet 17 g (17 g Oral Given 6/6/25 1244)   bisacodyl (DULCOLAX) suppository 10 mg (10 mg Rectal Given 6/6/25 1244)   ondansetron ODT (ZOFRAN-ODT) disintegrating tablet 4 mg (has no administration in time range)     Or   ondansetron (ZOFRAN) injection 4 mg (has no administration in time range)   sodium chloride 0.9 % bolus 1,000 mL (1,000 mL Intravenous New Bag 6/6/25 1024)   iopamidol (ISOVUE-300) 61 % injection 100 mL (85 mL Intravenous Given by Other 6/6/25 1003)       Imaging results:  CT Abdomen Pelvis With Contrast  Result Date: 6/6/2025   1. Acute interstitial edematous pancreatitis. 2. Mass seen in the pancreatic head with peripancreatic lymphadenopathy, concerning for pancreatic malignancy and stacey metastatic disease. GI evaluation. 3. Prostatomegaly. 4. Bladder wall thickening may be related to underdistention and/or chronic outlet obstruction.  Call Report: Dr. Lopez was notified by telephone of the above findings on June 6, 2025 at 10:30 a.m.  This report was finalized on 6/6/2025 10:33 AM by Dr. Al Rodriguez M.D on Workstation: [id number]        Ambulatory status:   - independent     Social issues:    Social History     Socioeconomic History    Marital status:     Number of children: 4   Tobacco Use    Smoking status: Former     Current packs/day: 0.00     Average packs/day: 1 pack/day for 25.0 years (25.0 ttl pk-yrs)     Types: Cigarettes     Start date: 1968     Quit date: 1993     Years since quittin.0    Smokeless tobacco: Never    Tobacco comments:     35 years ago   Vaping Use    Vaping status: Never Used   Substance and Sexual Activity    Alcohol use: Yes     Alcohol/week: 4.0 - 6.0 standard drinks of alcohol     Types: 4 - 6 Cans of beer per week     Comment: socially/4 or 5/week; Daily caffeine use    Drug use: Yes     Types: Marijuana     Comment: for glaucoma    Sexual activity: Not Currently       Peripheral Neurovascular  Peripheral Neurovascular (Adult)  Peripheral Neurovascular WDL: WDL    Neuro Cognitive  Neuro Cognitive (Adult)  Cognitive/Neuro/Behavioral WDL: WDL    Learning       Respiratory  Respiratory WDL  Respiratory WDL: WDL    Abdominal Pain       Pain Assessments  Pain (Adult)  (0-10) Pain Rating: Rest: 7  Pain Location: abdomen    NIH Stroke Scale       Samra Do RN  25 13:54 EDT

## 2025-06-07 ENCOUNTER — INPATIENT HOSPITAL (OUTPATIENT)
Dept: URBAN - METROPOLITAN AREA HOSPITAL 113 | Facility: HOSPITAL | Age: 65
End: 2025-06-07
Payer: MEDICARE

## 2025-06-07 DIAGNOSIS — R10.13 EPIGASTRIC PAIN: ICD-10-CM

## 2025-06-07 DIAGNOSIS — K86.89 OTHER SPECIFIED DISEASES OF PANCREAS: ICD-10-CM

## 2025-06-07 DIAGNOSIS — K85.90 ACUTE PANCREATITIS WITHOUT NECROSIS OR INFECTION, UNSPECIFIE: ICD-10-CM

## 2025-06-07 DIAGNOSIS — K59.00 CONSTIPATION, UNSPECIFIED: ICD-10-CM

## 2025-06-07 LAB
CRP SERPL-MCNC: 5.81 MG/DL (ref 0–0.5)
DEPRECATED RDW RBC AUTO: 46.8 FL (ref 37–54)
ERYTHROCYTE [DISTWIDTH] IN BLOOD BY AUTOMATED COUNT: 13.4 % (ref 12.3–15.4)
HCT VFR BLD AUTO: 42 % (ref 37.5–51)
HGB BLD-MCNC: 14.3 G/DL (ref 13–17.7)
LIPASE SERPL-CCNC: 1187 U/L (ref 13–60)
MAGNESIUM SERPL-MCNC: 2.1 MG/DL (ref 1.6–2.4)
MCH RBC QN AUTO: 32.6 PG (ref 26.6–33)
MCHC RBC AUTO-ENTMCNC: 34 G/DL (ref 31.5–35.7)
MCV RBC AUTO: 95.7 FL (ref 79–97)
PHOSPHATE SERPL-MCNC: 2.4 MG/DL (ref 2.5–4.5)
PLATELET # BLD AUTO: 201 10*3/MM3 (ref 140–450)
PMV BLD AUTO: 11.1 FL (ref 6–12)
RBC # BLD AUTO: 4.39 10*6/MM3 (ref 4.14–5.8)
WBC NRBC COR # BLD AUTO: 14.52 10*3/MM3 (ref 3.4–10.8)

## 2025-06-07 PROCEDURE — 83690 ASSAY OF LIPASE: CPT | Performed by: HOSPITALIST

## 2025-06-07 PROCEDURE — 80053 COMPREHEN METABOLIC PANEL: CPT | Performed by: HOSPITALIST

## 2025-06-07 PROCEDURE — 99232 SBSQ HOSP IP/OBS MODERATE 35: CPT | Performed by: INTERNAL MEDICINE

## 2025-06-07 PROCEDURE — 25810000003 LACTATED RINGERS PER 1000 ML: Performed by: INTERNAL MEDICINE

## 2025-06-07 PROCEDURE — 85027 COMPLETE CBC AUTOMATED: CPT | Performed by: HOSPITALIST

## 2025-06-07 PROCEDURE — 86140 C-REACTIVE PROTEIN: CPT | Performed by: INTERNAL MEDICINE

## 2025-06-07 PROCEDURE — 84100 ASSAY OF PHOSPHORUS: CPT | Performed by: HOSPITALIST

## 2025-06-07 PROCEDURE — 83735 ASSAY OF MAGNESIUM: CPT | Performed by: HOSPITALIST

## 2025-06-07 RX ORDER — AMOXICILLIN 250 MG
2 CAPSULE ORAL NIGHTLY
Status: DISCONTINUED | OUTPATIENT
Start: 2025-06-07 | End: 2025-06-09 | Stop reason: HOSPADM

## 2025-06-07 RX ORDER — SODIUM CHLORIDE, SODIUM LACTATE, POTASSIUM CHLORIDE, CALCIUM CHLORIDE 600; 310; 30; 20 MG/100ML; MG/100ML; MG/100ML; MG/100ML
100 INJECTION, SOLUTION INTRAVENOUS CONTINUOUS
Status: ACTIVE | OUTPATIENT
Start: 2025-06-07 | End: 2025-06-08

## 2025-06-07 RX ORDER — MAGNESIUM CARB/ALUMINUM HYDROX 105-160MG
296 TABLET,CHEWABLE ORAL ONCE
Status: COMPLETED | OUTPATIENT
Start: 2025-06-07 | End: 2025-06-07

## 2025-06-07 RX ADMIN — BISACODYL 10 MG: 10 SUPPOSITORY RECTAL at 08:04

## 2025-06-07 RX ADMIN — SODIUM CHLORIDE, SODIUM LACTATE, POTASSIUM CHLORIDE, CALCIUM CHLORIDE 100 ML/HR: 20; 30; 600; 310 INJECTION, SOLUTION INTRAVENOUS at 16:21

## 2025-06-07 RX ADMIN — TIMOLOL MALEATE 1 DROP: 5 SOLUTION OPHTHALMIC at 08:05

## 2025-06-07 RX ADMIN — OXYCODONE HYDROCHLORIDE 5 MG: 5 TABLET ORAL at 02:47

## 2025-06-07 RX ADMIN — MAGNESIUM CITRATE 296 ML: 1.75 LIQUID ORAL at 16:39

## 2025-06-07 RX ADMIN — LISINOPRIL 40 MG: 20 TABLET ORAL at 08:04

## 2025-06-07 RX ADMIN — PANTOPRAZOLE SODIUM 40 MG: 40 TABLET, DELAYED RELEASE ORAL at 05:05

## 2025-06-07 RX ADMIN — FINASTERIDE 5 MG: 5 TABLET, FILM COATED ORAL at 08:05

## 2025-06-07 RX ADMIN — AMLODIPINE BESYLATE 5 MG: 5 TABLET ORAL at 08:04

## 2025-06-07 RX ADMIN — METOPROLOL TARTRATE 25 MG: 25 TABLET, FILM COATED ORAL at 08:04

## 2025-06-07 RX ADMIN — SENNOSIDES AND DOCUSATE SODIUM 2 TABLET: 50; 8.6 TABLET ORAL at 20:30

## 2025-06-07 RX ADMIN — METOPROLOL TARTRATE 25 MG: 25 TABLET, FILM COATED ORAL at 20:20

## 2025-06-07 RX ADMIN — Medication 10 ML: at 20:20

## 2025-06-07 RX ADMIN — ROSUVASTATIN CALCIUM 20 MG: 20 TABLET, FILM COATED ORAL at 08:05

## 2025-06-07 RX ADMIN — POLYETHYLENE GLYCOL 3350 17 G: 17 POWDER, FOR SOLUTION ORAL at 08:04

## 2025-06-07 RX ADMIN — TIMOLOL MALEATE 1 DROP: 5 SOLUTION OPHTHALMIC at 16:24

## 2025-06-07 RX ADMIN — DOCUSATE SODIUM AND SENNOSIDES 2 TABLET: 8.6; 5 TABLET, FILM COATED ORAL at 13:19

## 2025-06-07 RX ADMIN — ACETAMINOPHEN 650 MG: 325 TABLET ORAL at 02:46

## 2025-06-07 RX ADMIN — Medication 10 ML: at 08:05

## 2025-06-07 NOTE — PROGRESS NOTES
[facility]    [phone]   LOS: 1 day     Name: Elia Walsh  Age/Sex: 65 y.o. male  :  1960        PCP: Vinh Rooney MD  Chief Complaint   Patient presents with    Abdominal Pain      Subjective     He feels okay today.  He had a small bowel movement after the suppository yesterday but really has not gone much.  Still feels bloated.  Did have 1 little episode of emesis last night mainly liquidy emesis.  None this morning but did feel little nauseous after eating breakfast.  He is otherwise tolerating a clear liquid diet.  Denies any fevers or chills.  He does feel like his pain is better controlled and did sleep better last night      amLODIPine, 5 mg, Oral, Daily  bisacodyl, 10 mg, Rectal, Daily  finasteride, 5 mg, Oral, Daily  lisinopril, 40 mg, Oral, Daily  metoprolol tartrate, 25 mg, Oral, BID  pantoprazole, 40 mg, Oral, Q AM  polyethylene glycol, 17 g, Oral, Daily  rosuvastatin, 20 mg, Oral, Daily  sodium chloride, 10 mL, Intravenous, Q12H  tamsulosin, 0.4 mg, Oral, Every Other Day  timolol, 1 drop, Both Eyes, BID           Objective   Vital Signs  Temp:  [98.2 °F (36.8 °C)] 98.2 °F (36.8 °C)  Heart Rate:  [56-66] 65  Resp:  [16] 16  BP: (120-153)/(75-89) 147/89  Body mass index is 25.12 kg/m².    Intake/Output Summary (Last 24 hours) at 2025 1046  Last data filed at 2025 0810  Gross per 24 hour   Intake 1600 ml   Output --   Net 1600 ml       Physical Exam  On exam he is sitting up in the bed in no distress alert and oriented x 4  Respirations are even and nonlabored clear to auscultation anteriorly  Regular rate and rhythm  Abdomen is soft distended but nontender today  No peripheral edema    Results Review:       I reviewed the patient's new clinical results.  Results from last 7 days   Lab Units 25  0504 25  0928   WBC 10*3/mm3 14.52* 13.97*   HEMOGLOBIN g/dL 14.3 16.2   PLATELETS 10*3/mm3 201 219     Results from last 7 days   Lab Units  06/07/25  0504 06/06/25  0928   SODIUM mmol/L 135* 135*   POTASSIUM mmol/L 3.8 4.2   CHLORIDE mmol/L 102 103   CO2 mmol/L 21.5* 21.0*   BUN mg/dL 11.0 13.0   CREATININE mg/dL 0.79 0.79   CALCIUM mg/dL 9.3 9.7   MAGNESIUM mg/dL 2.1  --    PHOSPHORUS mg/dL 2.4*  --    Estimated Creatinine Clearance: 110.9 mL/min (by C-G formula based on SCr of 0.79 mg/dL).      Assessment & Plan   Active Hospital Problems    Diagnosis  POA    **Pancreatitis, acute [K85.90]  Yes    Abdominal pain [R10.9]  Yes    Chronic anticoagulation [Z79.01]  Not Applicable    POLY (obstructive sleep apnea) [G47.33]  Yes    Paroxysmal atrial fibrillation [I48.0]  Yes    Benign prostatic hyperplasia with lower urinary tract symptoms [N40.1]  Yes    Mixed hyperlipidemia [E78.2]  Yes    Essential hypertension [I10]  Yes      Resolved Hospital Problems   No resolved problems to display.       ASSESMENT  This is a pleasant 65-year-old gentleman with a history of hypertension hyperlipidemia paroxysmal A-fib and obstructive sleep apnea who presents to the hospital with upper abdominal pain bloating and early satiety and is found to have acute pancreatitis and a pancreatic mass      PLAN  I would like to get him cleaned out a little further.  He really has not had much in the way of a bowel movement in quite a while.  I have him on MiraLAX and suppositories.  I am hesitant to start a stimulant laxative but I think we will need to start him on senna S today.  Hopefully we get his bowels moving and getting cleaned out and then consider a small bowel follow-through to evaluate for partial obstruction given significant early satiety and other symptoms he is experiencing after eating.  Appreciate GIs evaluation and they are planning endoscopic ultrasound and biopsy but this does not need to be done inpatient and I agree with their assessment there.  From a pancreatitis standpoint I think things are improving his pain is better he is less tender today.  Will  continue clear liquids and monitor closely    VTE Prophylaxis:  Mechanical VTE prophylaxis orders are present.      Code Status and Medical Interventions: CPR (Attempt to Resuscitate); Full Support   Ordered at: 06/06/25 1148     Code Status (Patient has no pulse and is not breathing):    CPR (Attempt to Resuscitate)     Medical Interventions (Patient has pulse or is breathing):    Full Support     Level Of Support Discussed With:    Patient          Disposition  Expected discharge date/ time has not been documented.       Ronnie Herrera MD  [facility]  06/07/25  10:46 EDT

## 2025-06-07 NOTE — PLAN OF CARE
Goal Outcome Evaluation:      Pt presented with ABD pain. DX: acute pancreatitis. Pt is A&Ox4, VSS, RA, up ad franklyn, clear liq diet.     POC: GI following. CT showed a mass located on pancreatyic head. Possible biopsy Monday

## 2025-06-07 NOTE — PROGRESS NOTES
LOS: 1 day   Patient Care Team:  Vinh Rooney MD as PCP - General (Internal Medicine)  Kat Fischer MD as Consulting Physician (Cardiology)  Al Haddad MD as Consulting Physician (Gastroenterology)      Subjective     Interval History:     Subjective: Abdominal pain significantly improved.  Tolerating clear liquids without pain.  Does still feel bloated and only had a small bowel movement with bisacodyl suppository x 2 and p.o. MiraLAX      ROS:   No chest pain, shortness of breath, or cough.        Medication Review:     Current Facility-Administered Medications:     acetaminophen (TYLENOL) tablet 650 mg, 650 mg, Oral, Q4H PRN, 650 mg at 06/07/25 0246 **OR** acetaminophen (TYLENOL) 160 MG/5ML oral solution 650 mg, 650 mg, Oral, Q4H PRN **OR** acetaminophen (TYLENOL) suppository 650 mg, 650 mg, Rectal, Q4H PRN, Ronnie Herrera MD    amLODIPine (NORVASC) tablet 5 mg, 5 mg, Oral, Daily, Ronnie Herrera MD, 5 mg at 06/07/25 0804    sennosides-docusate (PERICOLACE) 8.6-50 MG per tablet 2 tablet, 2 tablet, Oral, BID PRN, 2 tablet at 06/07/25 1319 **AND** polyethylene glycol (MIRALAX) packet 17 g, 17 g, Oral, Daily PRN **AND** bisacodyl (DULCOLAX) EC tablet 5 mg, 5 mg, Oral, Daily PRN **AND** bisacodyl (DULCOLAX) suppository 10 mg, 10 mg, Rectal, Daily PRN, Ronnie Herrera MD    bisacodyl (DULCOLAX) suppository 10 mg, 10 mg, Rectal, Daily, Ronnie Herrera MD, 10 mg at 06/07/25 0804    Calcium Replacement - Follow Nurse / BPA Driven Protocol, , Not Applicable, PRN, Ronnie Herrera MD    finasteride (PROSCAR) tablet 5 mg, 5 mg, Oral, Daily, Ronnie Herrera MD, 5 mg at 06/07/25 0805    lisinopril (PRINIVIL,ZESTRIL) tablet 40 mg, 40 mg, Oral, Daily, Ronnie Herrera MD, 40 mg at 06/07/25 0804    Magnesium Low Dose Replacement - Follow Nurse / BPA Driven Protocol, , Not Applicable, PRN, Ronnie Herrera MD    metoprolol tartrate (LOPRESSOR) tablet 25 mg, 25 mg,  Oral, BID, Ronnie Herrera MD, 25 mg at 06/07/25 0804    ondansetron ODT (ZOFRAN-ODT) disintegrating tablet 4 mg, 4 mg, Oral, Q6H PRN **OR** ondansetron (ZOFRAN) injection 4 mg, 4 mg, Intravenous, Q6H PRN, Ronnie Herrera MD    oxyCODONE (ROXICODONE) immediate release tablet 5 mg, 5 mg, Oral, Q4H PRN, Ronnie Herrera MD, 5 mg at 06/07/25 0247    pantoprazole (PROTONIX) EC tablet 40 mg, 40 mg, Oral, Q AM, Ronnie Herrera MD, 40 mg at 06/07/25 0505    Phosphorus Replacement - Follow Nurse / BPA Driven Protocol, , Not Applicable, PRN, Ronnie Herrera MD    polyethylene glycol (MIRALAX) packet 17 g, 17 g, Oral, Daily, Ronnie Herrera MD, 17 g at 06/07/25 0804    Potassium Replacement - Follow Nurse / BPA Driven Protocol, , Not Applicable, PRN, Ronnie Herrera MD    rosuvastatin (CRESTOR) tablet 20 mg, 20 mg, Oral, Daily, Ronnie Herrera MD, 20 mg at 06/07/25 0805    sennosides-docusate (PERICOLACE) 8.6-50 MG per tablet 2 tablet, 2 tablet, Oral, Nightly, Ronnie Herrera MD    [COMPLETED] Insert Peripheral IV, , , Once **AND** sodium chloride 0.9 % flush 10 mL, 10 mL, Intravenous, PRN, Yahir Lopez MD    sodium chloride 0.9 % flush 10 mL, 10 mL, Intravenous, Q12H, Ronnie Herrera MD, 10 mL at 06/07/25 0805    sodium chloride 0.9 % flush 10 mL, 10 mL, Intravenous, PRN, Ronnie Herrera MD    sodium chloride 0.9 % infusion 40 mL, 40 mL, Intravenous, PRN, Ronnie Herrera MD    tamsulosin (FLOMAX) 24 hr capsule 0.4 mg, 0.4 mg, Oral, Every Other Day, Ronnie Herrera MD, 0.4 mg at 06/06/25 1243    timolol (TIMOPTIC) 0.5 % ophthalmic solution 1 drop, 1 drop, Both Eyes, BID, Ronnie Herrera MD, 1 drop at 06/07/25 0805      Objective     Vital Signs  Vitals:    06/06/25 2307 06/07/25 0603 06/07/25 0715 06/07/25 1114   BP: 120/75  147/89 150/77   BP Location: Right arm  Right arm Right arm   Patient Position: Lying  Lying Lying   Pulse: 66  65 54   Resp:  16  16 16   Temp: 98.2 °F (36.8 °C)  98.2 °F (36.8 °C) 98.1 °F (36.7 °C)   TempSrc: Oral  Oral Oral   SpO2: 97%  96% 97%   Weight:  86.4 kg (190 lb 6.4 oz)     Height:         Physical Exam:    General Appearance:    Awake and alert, in no acute distress   Head:    Normocephalic, without obvious abnormality   Eyes:          Conjunctivae normal, anicteric sclera   Ears:    Hearing intact   Lungs:     respirations regular, even and unlabored   Abdomen:     soft, minimal tenderness midepigastric no rebound or guarding and mildly distended.   Rectal:     Deferred   Extremities:   No edema, no cyanosis, no redness        Results Review:    Lab Results (last 24 hours)       Procedure Component Value Units Date/Time    Lipase [[id number]]  (Abnormal) Collected: 06/07/25 0504    Specimen: Blood Updated: 06/07/25 1123     Lipase 1,187 U/L     Magnesium [[id number]]  (Normal) Collected: 06/07/25 0504    Specimen: Blood Updated: 06/07/25 0634     Magnesium 2.1 mg/dL     Comprehensive Metabolic Panel [[id number]]  (Abnormal) Collected: 06/07/25 0504    Specimen: Blood Updated: 06/07/25 0634     Glucose 116 mg/dL      BUN 11.0 mg/dL      Creatinine 0.79 mg/dL      Sodium 135 mmol/L      Potassium 3.8 mmol/L      Chloride 102 mmol/L      CO2 21.5 mmol/L      Calcium 9.3 mg/dL      Total Protein 7.2 g/dL      Albumin 4.1 g/dL      ALT (SGPT) 18 U/L      AST (SGOT) 17 U/L      Alkaline Phosphatase 98 U/L      Total Bilirubin 0.9 mg/dL      Globulin 3.1 gm/dL      A/G Ratio 1.3 g/dL      BUN/Creatinine Ratio 13.9     Anion Gap 11.5 mmol/L      eGFR 97.4 mL/min/1.73     Narrative:      GFR Categories in Chronic Kidney Disease (CKD)              GFR Category          GFR (mL/min/1.73)    Interpretation  G1                    90 or greater        Normal or high (1)  G2                    60-89                Mild decrease (1)  G3a                   45-59                Mild to moderate decrease  G3b                   30-44                 Moderate to severe decrease  G4                    15-29                Severe decrease  G5                    14 or less           Kidney failure    (1)In the absence of evidence of kidney disease, neither GFR category G1 or G2 fulfill the criteria for CKD.    eGFR calculation 2021 CKD-EPI creatinine equation, which does not include race as a factor    Phosphorus [[id number]]  (Abnormal) Collected: 06/07/25 0504    Specimen: Blood Updated: 06/07/25 0627     Phosphorus 2.4 mg/dL     CBC (No Diff) [[id number]]  (Abnormal) Collected: 06/07/25 0504    Specimen: Blood Updated: 06/07/25 0606     WBC 14.52 10*3/mm3      RBC 4.39 10*6/mm3      Hemoglobin 14.3 g/dL      Hematocrit 42.0 %      MCV 95.7 fL      MCH 32.6 pg      MCHC 34.0 g/dL      RDW 13.4 %      RDW-SD 46.8 fl      MPV 11.1 fL      Platelets 201 10*3/mm3             Imaging Results (Last 24 Hours)       ** No results found for the last 24 hours. **              ASSESSMENT:  Acute pancreatitis  Pancreatic mass  Epigastric pain  Constipation     Principal Problem:    Pancreatitis, acute  Active Problems:    Essential hypertension    Mixed hyperlipidemia    Benign prostatic hyperplasia with lower urinary tract symptoms    Paroxysmal atrial fibrillation    POLY (obstructive sleep apnea)    Chronic anticoagulation    Abdominal pain        Impression: This is a 65-year-old male with a remote history of tobacco abuse but no family history of pancreatic cancer presenting with a few week history of intermittent abdominal pain with elevated lipase and pancreatitis on imaging with concern for a small 2.3 cm pancreatic head mass.  CA 19-9 is normal and patient has no weight loss     PLAN:  -I reviewed his CT scan and there is no sign of even partial bowel obstruction or ileus.  I suspect constipation has something to do with his distention.  I will give 300 mL of magnesium citrate.  -CRP daily  - Lipase daily  - He is drinking clear liquids, but he still should be on  lactated Ringer's as long as he has no shortness of breath  -CRP pending.  We will keep him clear liquids another day before advancing diet to low-fat pending CRP numbers today and tomorrow  - Patient is already drinking clear liquids and pain has improved with oxycodone.  We will continue clear liquids  - Will need endoscopic ultrasound with fine-needle biopsy of the pancreatic mass.  This is not emergent and will not be done over the weekend.  If he is here on Monday, we could do it then, or he can be discharged and we can proceed with biopsy next week as an outpatient.  Sometimes it is better to allow the acute pancreatitis to calm down as this often looks like a mass on endoscopic ultrasound.  -I will check on him again in the morning and ensure that his pain has mostly resolved.  Graham Luke MD  06/07/25  15:32 EDT

## 2025-06-07 NOTE — PLAN OF CARE
Goal Outcome Evaluation:  Plan of Care Reviewed With: patient        Progress: no change  Outcome Evaluation: Pt is A&OX4, calm and cooperative. Up ad franklyn. Pt continues wtih abd tenderness. Intermittent N/V. Pt given PRN and scheduled bowel meds for constipation. Pt Con of B&B. Meds whole with water. GI following. Pt able to make needs known, call light within reach.                              [Negative] : Allergic/Immunologic

## 2025-06-08 ENCOUNTER — INPATIENT HOSPITAL (OUTPATIENT)
Dept: URBAN - METROPOLITAN AREA HOSPITAL 113 | Facility: HOSPITAL | Age: 65
End: 2025-06-08
Payer: MEDICARE

## 2025-06-08 DIAGNOSIS — R10.13 EPIGASTRIC PAIN: ICD-10-CM

## 2025-06-08 DIAGNOSIS — K86.89 OTHER SPECIFIED DISEASES OF PANCREAS: ICD-10-CM

## 2025-06-08 DIAGNOSIS — K59.00 CONSTIPATION, UNSPECIFIED: ICD-10-CM

## 2025-06-08 DIAGNOSIS — K85.90 ACUTE PANCREATITIS WITHOUT NECROSIS OR INFECTION, UNSPECIFIE: ICD-10-CM

## 2025-06-08 LAB
ALBUMIN SERPL-MCNC: 4.2 G/DL (ref 3.5–5.2)
ALBUMIN/GLOB SERPL: 1.4 G/DL
ALP SERPL-CCNC: 102 U/L (ref 39–117)
ALT SERPL W P-5'-P-CCNC: 18 U/L (ref 1–41)
ANION GAP SERPL CALCULATED.3IONS-SCNC: 10.6 MMOL/L (ref 5–15)
AST SERPL-CCNC: 16 U/L (ref 1–40)
BILIRUB SERPL-MCNC: 0.7 MG/DL (ref 0–1.2)
BUN SERPL-MCNC: 10 MG/DL (ref 8–23)
BUN/CREAT SERPL: 13.9 (ref 7–25)
CALCIUM SPEC-SCNC: 9.2 MG/DL (ref 8.6–10.5)
CHLORIDE SERPL-SCNC: 103 MMOL/L (ref 98–107)
CO2 SERPL-SCNC: 23.4 MMOL/L (ref 22–29)
CREAT SERPL-MCNC: 0.72 MG/DL (ref 0.76–1.27)
CRP SERPL-MCNC: 5.42 MG/DL (ref 0–0.5)
DEPRECATED RDW RBC AUTO: 47.5 FL (ref 37–54)
EGFRCR SERPLBLD CKD-EPI 2021: 100.1 ML/MIN/1.73
ERYTHROCYTE [DISTWIDTH] IN BLOOD BY AUTOMATED COUNT: 13.1 % (ref 12.3–15.4)
GLOBULIN UR ELPH-MCNC: 3 GM/DL
GLUCOSE SERPL-MCNC: 124 MG/DL (ref 65–99)
HCT VFR BLD AUTO: 42.9 % (ref 37.5–51)
HGB BLD-MCNC: 14.7 G/DL (ref 13–17.7)
LIPASE SERPL-CCNC: 1058 U/L (ref 13–60)
MCH RBC QN AUTO: 33 PG (ref 26.6–33)
MCHC RBC AUTO-ENTMCNC: 34.3 G/DL (ref 31.5–35.7)
MCV RBC AUTO: 96.2 FL (ref 79–97)
PLATELET # BLD AUTO: 221 10*3/MM3 (ref 140–450)
PMV BLD AUTO: 10.4 FL (ref 6–12)
POTASSIUM SERPL-SCNC: 3.7 MMOL/L (ref 3.5–5.2)
PROT SERPL-MCNC: 7.2 G/DL (ref 6–8.5)
QT INTERVAL: 411 MS
QTC INTERVAL: 415 MS
RBC # BLD AUTO: 4.46 10*6/MM3 (ref 4.14–5.8)
SODIUM SERPL-SCNC: 137 MMOL/L (ref 136–145)
WBC NRBC COR # BLD AUTO: 12.46 10*3/MM3 (ref 3.4–10.8)

## 2025-06-08 PROCEDURE — 99232 SBSQ HOSP IP/OBS MODERATE 35: CPT | Performed by: INTERNAL MEDICINE

## 2025-06-08 PROCEDURE — 85027 COMPLETE CBC AUTOMATED: CPT | Performed by: HOSPITALIST

## 2025-06-08 PROCEDURE — 83690 ASSAY OF LIPASE: CPT | Performed by: HOSPITALIST

## 2025-06-08 PROCEDURE — 80053 COMPREHEN METABOLIC PANEL: CPT | Performed by: HOSPITALIST

## 2025-06-08 PROCEDURE — 86140 C-REACTIVE PROTEIN: CPT | Performed by: INTERNAL MEDICINE

## 2025-06-08 PROCEDURE — 25810000003 LACTATED RINGERS PER 1000 ML: Performed by: INTERNAL MEDICINE

## 2025-06-08 RX ORDER — SODIUM CHLORIDE, SODIUM LACTATE, POTASSIUM CHLORIDE, CALCIUM CHLORIDE 600; 310; 30; 20 MG/100ML; MG/100ML; MG/100ML; MG/100ML
100 INJECTION, SOLUTION INTRAVENOUS CONTINUOUS
Status: DISCONTINUED | OUTPATIENT
Start: 2025-06-08 | End: 2025-06-09 | Stop reason: HOSPADM

## 2025-06-08 RX ADMIN — LISINOPRIL 40 MG: 20 TABLET ORAL at 08:48

## 2025-06-08 RX ADMIN — Medication 10 ML: at 08:48

## 2025-06-08 RX ADMIN — POLYETHYLENE GLYCOL 3350 17 G: 17 POWDER, FOR SOLUTION ORAL at 08:47

## 2025-06-08 RX ADMIN — TIMOLOL MALEATE 1 DROP: 5 SOLUTION OPHTHALMIC at 08:48

## 2025-06-08 RX ADMIN — ROSUVASTATIN CALCIUM 20 MG: 20 TABLET, FILM COATED ORAL at 08:47

## 2025-06-08 RX ADMIN — BISACODYL 10 MG: 10 SUPPOSITORY RECTAL at 08:54

## 2025-06-08 RX ADMIN — METOPROLOL TARTRATE 25 MG: 25 TABLET, FILM COATED ORAL at 08:47

## 2025-06-08 RX ADMIN — AMLODIPINE BESYLATE 5 MG: 5 TABLET ORAL at 08:48

## 2025-06-08 RX ADMIN — SODIUM CHLORIDE, SODIUM LACTATE, POTASSIUM CHLORIDE, CALCIUM CHLORIDE 100 ML/HR: 20; 30; 600; 310 INJECTION, SOLUTION INTRAVENOUS at 08:47

## 2025-06-08 RX ADMIN — PANTOPRAZOLE SODIUM 40 MG: 40 TABLET, DELAYED RELEASE ORAL at 06:57

## 2025-06-08 RX ADMIN — TIMOLOL MALEATE 1 DROP: 5 SOLUTION OPHTHALMIC at 16:38

## 2025-06-08 RX ADMIN — TAMSULOSIN HYDROCHLORIDE 0.4 MG: 0.4 CAPSULE ORAL at 08:48

## 2025-06-08 RX ADMIN — METOPROLOL TARTRATE 25 MG: 25 TABLET, FILM COATED ORAL at 20:58

## 2025-06-08 RX ADMIN — Medication 10 ML: at 20:58

## 2025-06-08 RX ADMIN — FINASTERIDE 5 MG: 5 TABLET, FILM COATED ORAL at 08:48

## 2025-06-08 NOTE — PLAN OF CARE
Goal Outcome Evaluation:    Problem: Comorbidity Management  Goal: Blood Pressure in Desired Range  Intervention: Maintain Blood Pressure Management  Recent Flowsheet Documentation  Taken 6/8/2025 1608 by Dontae Silva RN  Medication Review/Management: medications reviewed  Taken 6/8/2025 1410 by Dontae Silva RN  Medication Review/Management: medications reviewed  Taken 6/8/2025 1210 by Dontae Silva RN  Medication Review/Management: medications reviewed  Taken 6/8/2025 1007 by Dontae Silva RN  Medication Review/Management: medications reviewed  Taken 6/8/2025 0830 by Dontae Sivla RN  Medication Review/Management: medications reviewed   Plan of Care Reviewed With: patient alert, room air, iv fluids D/C

## 2025-06-08 NOTE — PROGRESS NOTES
LOS: 2 days   Patient Care Team:  Vinh Rooney MD as PCP - General (Internal Medicine)  Kat Fischer MD as Consulting Physician (Cardiology)  Al Haddad MD as Consulting Physician (Gastroenterology)      Subjective     Interval History:     Subjective: Had multiple large bowel movements overnight.  They started to turn liquidy after taking magnesium citrate.  Abdomen much less distended and felt more comfortable following multiple bowel movements.  He does state that after he ate his abdomen did distend some but was not as bad as it was before.      ROS:   No chest pain, shortness of breath, or cough.        Medication Review:     Current Facility-Administered Medications:     acetaminophen (TYLENOL) tablet 650 mg, 650 mg, Oral, Q4H PRN, 650 mg at 06/07/25 0246 **OR** acetaminophen (TYLENOL) 160 MG/5ML oral solution 650 mg, 650 mg, Oral, Q4H PRN **OR** acetaminophen (TYLENOL) suppository 650 mg, 650 mg, Rectal, Q4H PRN, Ronnie Herrera MD    amLODIPine (NORVASC) tablet 5 mg, 5 mg, Oral, Daily, Ronnie Herrera MD, 5 mg at 06/08/25 0848    sennosides-docusate (PERICOLACE) 8.6-50 MG per tablet 2 tablet, 2 tablet, Oral, BID PRN, 2 tablet at 06/07/25 1319 **AND** polyethylene glycol (MIRALAX) packet 17 g, 17 g, Oral, Daily PRN **AND** bisacodyl (DULCOLAX) EC tablet 5 mg, 5 mg, Oral, Daily PRN **AND** bisacodyl (DULCOLAX) suppository 10 mg, 10 mg, Rectal, Daily PRN, Ronnie Herrera MD    bisacodyl (DULCOLAX) suppository 10 mg, 10 mg, Rectal, Daily, Ronnie Herrera MD, 10 mg at 06/08/25 0854    Calcium Replacement - Follow Nurse / BPA Driven Protocol, , Not Applicable, PRN, Ronnie Herrera MD    finasteride (PROSCAR) tablet 5 mg, 5 mg, Oral, Daily, Ronnie Herrera MD, 5 mg at 06/08/25 0848    lactated ringers infusion, 100 mL/hr, Intravenous, Continuous, Graham Luke MD, Last Rate: 100 mL/hr at 06/08/25 0847, 100 mL/hr at 06/08/25 0847    lactated ringers infusion,  100 mL/hr, Intravenous, Continuous, Alejandro Wing MD, Last Rate: 100 mL/hr at 06/08/25 1348, 100 mL/hr at 06/08/25 1348    lisinopril (PRINIVIL,ZESTRIL) tablet 40 mg, 40 mg, Oral, Daily, Ronnie Herrera MD, 40 mg at 06/08/25 0848    Magnesium Low Dose Replacement - Follow Nurse / BPA Driven Protocol, , Not Applicable, PRN, Ronnie Herrera MD    metoprolol tartrate (LOPRESSOR) tablet 25 mg, 25 mg, Oral, BID, Ronnie Herrera MD, 25 mg at 06/08/25 0847    ondansetron ODT (ZOFRAN-ODT) disintegrating tablet 4 mg, 4 mg, Oral, Q6H PRN **OR** ondansetron (ZOFRAN) injection 4 mg, 4 mg, Intravenous, Q6H PRN, Ronnie Herrera MD    oxyCODONE (ROXICODONE) immediate release tablet 5 mg, 5 mg, Oral, Q4H PRN, Ronnie Herrera MD, 5 mg at 06/07/25 0247    pantoprazole (PROTONIX) EC tablet 40 mg, 40 mg, Oral, Q AM, Ronnie Herrera MD, 40 mg at 06/08/25 0657    Phosphorus Replacement - Follow Nurse / BPA Driven Protocol, , Not Applicable, PRN, Ronnie Herrera MD    polyethylene glycol (MIRALAX) packet 17 g, 17 g, Oral, Daily, Ronnie Herrera MD, 17 g at 06/08/25 0847    Potassium Replacement - Follow Nurse / BPA Driven Protocol, , Not Applicable, PRN, Ronnie Herrera MD    rosuvastatin (CRESTOR) tablet 20 mg, 20 mg, Oral, Daily, Ronnie Herrera MD, 20 mg at 06/08/25 0847    sennosides-docusate (PERICOLACE) 8.6-50 MG per tablet 2 tablet, 2 tablet, Oral, Nightly, Ronnie Herrera MD, 2 tablet at 06/07/25 2030    [COMPLETED] Insert Peripheral IV, , , Once **AND** sodium chloride 0.9 % flush 10 mL, 10 mL, Intravenous, PRN, Yahir Lopez MD    sodium chloride 0.9 % flush 10 mL, 10 mL, Intravenous, Q12H, Ronnie Herrera MD, 10 mL at 06/08/25 0848    sodium chloride 0.9 % flush 10 mL, 10 mL, Intravenous, PRN, Ronnie Herrera MD    sodium chloride 0.9 % infusion 40 mL, 40 mL, Intravenous, PRN, Ronnie Herrera MD    tamsulosin (FLOMAX) 24 hr capsule 0.4 mg, 0.4 mg,  Oral, Every Other Day, Ronnie Herrera MD, 0.4 mg at 06/08/25 0848    timolol (TIMOPTIC) 0.5 % ophthalmic solution 1 drop, 1 drop, Both Eyes, BID, Ronnie Herrera MD, 1 drop at 06/08/25 0848      Objective     Vital Signs  Vitals:    06/07/25 2335 06/08/25 0611 06/08/25 0736 06/08/25 1056   BP: 129/86  143/81 138/72   BP Location: Right arm  Right arm Right arm   Patient Position: Lying  Lying Lying   Pulse: 67  74 57   Resp: 16  16 16   Temp: 98.4 °F (36.9 °C)  98.1 °F (36.7 °C) 98.2 °F (36.8 °C)   TempSrc: Oral  Oral Oral   SpO2: 98%  95% 96%   Weight:  85.5 kg (188 lb 9.6 oz)     Height:         Physical Exam:    General Appearance:    Awake and alert, in no acute distress   Head:    Normocephalic, without obvious abnormality   Eyes:          Conjunctivae normal, anicteric sclera   Ears:    Hearing intact   Lungs:     respirations regular, even and unlabored   Abdomen:     soft, non-tender, no rebound or guarding, non-distended, no hepatosplenomegaly    Rectal:     Deferred   Extremities:   No edema, no cyanosis, no redness        Results Review:    Lab Results (last 24 hours)       Procedure Component Value Units Date/Time    Lipase [[id number]]  (Abnormal) Collected: 06/08/25 0103    Specimen: Blood Updated: 06/08/25 0155     Lipase 1,058 U/L     Comprehensive Metabolic Panel [[id number]]  (Abnormal) Collected: 06/08/25 0103    Specimen: Blood Updated: 06/08/25 0148     Glucose 124 mg/dL      BUN 10.0 mg/dL      Creatinine 0.72 mg/dL      Sodium 137 mmol/L      Potassium 3.7 mmol/L      Chloride 103 mmol/L      CO2 23.4 mmol/L      Calcium 9.2 mg/dL      Total Protein 7.2 g/dL      Albumin 4.2 g/dL      ALT (SGPT) 18 U/L      AST (SGOT) 16 U/L      Alkaline Phosphatase 102 U/L      Total Bilirubin 0.7 mg/dL      Globulin 3.0 gm/dL      A/G Ratio 1.4 g/dL      BUN/Creatinine Ratio 13.9     Anion Gap 10.6 mmol/L      eGFR 100.1 mL/min/1.73     Narrative:      GFR Categories in Chronic Kidney Disease (CKD)               GFR Category          GFR (mL/min/1.73)    Interpretation  G1                    90 or greater        Normal or high (1)  G2                    60-89                Mild decrease (1)  G3a                   45-59                Mild to moderate decrease  G3b                   30-44                Moderate to severe decrease  G4                    15-29                Severe decrease  G5                    14 or less           Kidney failure    (1)In the absence of evidence of kidney disease, neither GFR category G1 or G2 fulfill the criteria for CKD.    eGFR calculation 2021 CKD-EPI creatinine equation, which does not include race as a factor    C-reactive Protein [[id number]]  (Abnormal) Collected: 06/08/25 0103    Specimen: Blood Updated: 06/08/25 0148     C-Reactive Protein 5.42 mg/dL     CBC (No Diff) [[id number]]  (Abnormal) Collected: 06/08/25 0103    Specimen: Blood Updated: 06/08/25 0127     WBC 12.46 10*3/mm3      RBC 4.46 10*6/mm3      Hemoglobin 14.7 g/dL      Hematocrit 42.9 %      MCV 96.2 fL      MCH 33.0 pg      MCHC 34.3 g/dL      RDW 13.1 %      RDW-SD 47.5 fl      MPV 10.4 fL      Platelets 221 10*3/mm3     IgG 4 [[id number]] Collected: 06/08/25 0103    Specimen: Blood Updated: 06/08/25 0122    PAGE by IFA, Reflex to Titer and Pattern [[id number]] Collected: 06/08/25 0103    Specimen: Blood Updated: 06/08/25 0122    C-reactive Protein [[id number]]  (Abnormal) Collected: 06/07/25 0504    Specimen: Blood Updated: 06/07/25 1554     C-Reactive Protein 5.81 mg/dL             Imaging Results (Last 24 Hours)       ** No results found for the last 24 hours. **              ASSESSMENT:  Acute pancreatitis  Pancreatic mass  Epigastric pain  Constipation     Principal Problem:    Pancreatitis, acute  Active Problems:    Essential hypertension    Mixed hyperlipidemia    Benign prostatic hyperplasia with lower urinary tract symptoms    Paroxysmal atrial fibrillation    POLY (obstructive sleep apnea)     Chronic anticoagulation    Abdominal pain        Impression: This is a 65-year-old male with a remote history of tobacco abuse but no family history of pancreatic cancer presenting with a few week history of intermittent abdominal pain with elevated lipase and pancreatitis on imaging with concern for a small 2.3 cm pancreatic head mass.  CA 19-9 is normal and patient has no weight loss     PLAN:  - Recommend MiraLAX and magnesium oxide daily as a bowel regimen  - CRP is stable  - Will give low-fat diet  - Will need endoscopic ultrasound with fine-needle biopsy of the pancreatic mass.  This is not emergent and will not be done over the weekend.  If he is here on Monday, we could do it then, or he can be discharged and we can proceed with biopsy next week as an outpatient.  Sometimes it is better to allow the acute pancreatitis to calm down as this often looks like a mass on endoscopic ultrasound.  -If he tolerates a low-fat diet for dinner, he can be discharged from a GI standpoint.  We will schedule outpatient endoscopic ultrasound in the next 7 days which will give his pancreas a few more days to calm down before FNA is performed.    Graham Lkue MD  06/08/25  15:50 EDT

## 2025-06-08 NOTE — PROGRESS NOTES
Name: Elia Walsh ADMIT: 2025   : 1960  PCP: Vinh Rooney MD    MRN: [id number] LOS: 2 days   AGE/SEX: 65 y.o. male  ROOM: [facility]     Subjective   Subjective     No events overnight. He reports multiple episodes of diarrhea with the bowel prep yesterday. He hasn't had any further abdominal pain or vomiting today and has been tolerating the clear liquids, but he does report abdominal fullness and some nausea       Objective   Objective   Vital Signs  Temp:  [98.1 °F (36.7 °C)-99 °F (37.2 °C)] 98.2 °F (36.8 °C)  Heart Rate:  [57-74] 57  Resp:  [16] 16  BP: (116-149)/(72-86) 138/72  SpO2:  [95 %-98 %] 96 %  on   ;   Device (Oxygen Therapy): room air  Body mass index is 24.88 kg/m².  Physical Exam  Constitutional:       General: He is not in acute distress.     Appearance: He is not toxic-appearing.   Cardiovascular:      Rate and Rhythm: Normal rate and regular rhythm.      Heart sounds: Normal heart sounds.   Pulmonary:      Effort: Pulmonary effort is normal.      Breath sounds: Normal breath sounds.   Abdominal:      General: Bowel sounds are normal. There is distension.      Palpations: Abdomen is soft.      Tenderness: There is no abdominal tenderness. There is no guarding or rebound.   Musculoskeletal:         General: No tenderness.      Right lower leg: No edema.      Left lower leg: No edema.   Neurological:      Mental Status: He is alert.   Psychiatric:         Mood and Affect: Mood normal.         Behavior: Behavior normal.         Results Review     I reviewed the patient's new clinical results.  Results from last 7 days   Lab Units 25  0103 25  0504 25  0928   WBC 10*3/mm3 12.46* 14.52* 13.97*   HEMOGLOBIN g/dL 14.7 14.3 16.2   PLATELETS 10*3/mm3 221 201 219     Results from last 7 days   Lab Units 25  0103 25  0504 25  0928   SODIUM mmol/L 137 135* 135*   POTASSIUM mmol/L 3.7 3.8 4.2   CHLORIDE mmol/L 103 102 103   CO2 mmol/L 23.4 21.5*  21.0*   BUN mg/dL 10.0 11.0 13.0   CREATININE mg/dL 0.72* 0.79 0.79   GLUCOSE mg/dL 124* 116* 133*   Estimated Creatinine Clearance: 120.4 mL/min (A) (by C-G formula based on SCr of 0.72 mg/dL (L)).  Results from last 7 days   Lab Units 06/08/25  0103 06/07/25  0504 06/06/25  0928   ALBUMIN g/dL 4.2 4.1 4.6   BILIRUBIN mg/dL 0.7 0.9 0.9   ALK PHOS U/L 102 98 108   AST (SGOT) U/L 16 17 25   ALT (SGPT) U/L 18 18 23     Results from last 7 days   Lab Units 06/08/25  0103 06/07/25  0504 06/06/25  0928   CALCIUM mg/dL 9.2 9.3 9.7   ALBUMIN g/dL 4.2 4.1 4.6   MAGNESIUM mg/dL  --  2.1  --    PHOSPHORUS mg/dL  --  2.4*  --      Results from last 7 days   Lab Units 06/06/25  0928   LACTATE mmol/L 0.8     COVID19   Date Value Ref Range Status   02/26/2021 Not Detected Not Detected - Ref. Range Final     SARS-CoV-2, MAR   Date Value Ref Range Status   01/17/2022 Not Detected Not Detected Final     Comment:     This nucleic acid amplification test was developed and its performance  characteristics determined by [employer]. Nucleic acid  amplification tests include RT-PCR and TMA. This test has not been  FDA cleared or approved. This test has been authorized by FDA under  an Emergency Use Authorization (EUA). This test is only authorized  for the duration of time the declaration that circumstances exist  justifying the authorization of the emergency use of in vitro  diagnostic tests for detection of SARS-CoV-2 virus and/or diagnosis  of COVID-19 infection under section 564(b)(1) of the Act, 21 U.S.C.  360bbb-3(b) (1), unless the authorization is terminated or revoked  sooner.  When diagnostic testing is negative, the possibility of a false  negative result should be considered in the context of a patient's  recent exposures and the presence of clinical signs and symptoms  consistent with COVID-19. An individual without symptoms of COVID-19  and who is not shedding SARS-CoV-2 virus would expect to have a  negative (not  detected) result in this assay.   12/29/2021 Not Detected Not Detected Final     Comment:     This nucleic acid amplification test was developed and its performance  characteristics determined by [employer]. Nucleic acid  amplification tests include RT-PCR and TMA. This test has not been  FDA cleared or approved. This test has been authorized by FDA under  an Emergency Use Authorization (EUA). This test is only authorized  for the duration of time the declaration that circumstances exist  justifying the authorization of the emergency use of in vitro  diagnostic tests for detection of SARS-CoV-2 virus and/or diagnosis  of COVID-19 infection under section 564(b)(1) of the Act, 21 U.S.C.  360bbb-3(b) (1), unless the authorization is terminated or revoked  sooner.  When diagnostic testing is negative, the possibility of a false  negative result should be considered in the context of a patient's  recent exposures and the presence of clinical signs and symptoms  consistent with COVID-19. An individual without symptoms of COVID-19  and who is not shedding SARS-CoV-2 virus would expect to have a  negative (not detected) result in this assay.     SARS-CoV-2 PCR   Date Value Ref Range Status   12/18/2020 Not Detected Not Detected Final     Comment:     Nucleic acid specific to SARS-CoV-2 (COVID-19) virus was not detected in  this sample by the Aptima (R) SARS-CoV-2 Assay.                SARS-CoV-2 (COVID-19) nucleic acid testing performed using [employer] Aptima (R) SARS-CoV-2 Assay or [employer] TaqPath (TM)  COVID-19 Combo Kit as indicated above under Emergency Use Authorization (EUA) from the FDA. Aptima (R) and TaqPath (TM) test performance  characteristics verified by [facility] in accordance with the FDAs Guidance Document (Policy for Diagnostic Tests for Coronavirus Disease-2019  during the Public Health Emergency) issued on March 16, 2020. The laboratory is regulated under CLIA as qualified to  "perform high-complexity testing  Unless otherwise noted, all testing was performed at [facility], [place] [id number], [place] State Licensee No. [id number]     No results found for: \"HGBA1C\", \"POCGLU\"    CT Abdomen Pelvis With Contrast  Narrative: CT ABDOMEN PELVIS W CONTRAST-     INDICATION: Generalized abdominal pain. Bloating.     COMPARISON: None     TECHNIQUE:  Routine CT abdomen/pelvis with IV contrast. Coronal and sagittal  reformats. Radiation dose reduction techniques were utilized, including  automated exposure control and exposure modulation based on body size.     FINDINGS:      Lung bases: Respiratory motion artifact.     ABDOMEN: Small cyst in the inferior right hepatic lobe. Normal  gallbladder. No biliary ductal dilatation. Spleen is small in size.  Calcifications in the spleen, may be sequela of old granulomatous  infection.      Peripancreatic fat stranding. No pancreatic ductal dilatation. Suspicion  for a slightly hypoattenuating mass in the pancreatic head on series 2,  axial image 49, measuring approximately 2.5 x 2.3 cm, with enlarged  peripancreatic lymph nodes present. For example, peripancreatic  head/transverse mesocolon lymph node, series 2, axial mage 49, measures  1.4 cm. For example, suspect enlarged lymph node adjacent to the  pancreatic uncinate 8, series 2, axial mage 53, measuring 1.6 cm. For  example, periportal lymph node, series 2, axial mage 34, measuring 1.1  cm. For example, mesenteric root lymph node, series 2, axial mage 61,  measuring 1.0 cm. Possible peripancreatic lymph node or pancreatic  lobulation on series 2, axial mage 34 adjacent to the pancreatic body  measuring 1.1 cm. Normal pancreatic enhancement. No peripancreatic fluid  collection. Splenic vein is patent. No splenic artery pseudoaneurysm.     No adrenal nodules. Small renal cyst. Fat-containing mass seen in the  inferior pole left kidney, series 2, axial mage 64, measures 1.3 cm,  consistent with a renal " angiomyolipoma, benign. No hydronephrosis.     Pelvis: Prostatomegaly, with the prostate measuring 5.1 cm in right to  left dimension, with nodular mass effect on and uplifting of the bladder  apex. Bladder wall thickening, with under distention. Right posterior  lateral bladder wall diverticulum. No bladder calculus. Phleboliths in  the pelvis.     Bowel: No obstruction. Normal appendix.     Abdominal wall: Rectus diastases. Tiny fat-containing umbilical hernia.  Fat-containing right inguinal hernia.     Retroperitoneum: See above.     Vasculature: See above. Patent. No abdominal aortic aneurysm. Mild  aortoiliac atherosclerotic calcification.     Osseous structures: No destructive osseous lesions.     Impression:    1. Acute interstitial edematous pancreatitis.  2. Mass seen in the pancreatic head with peripancreatic lymphadenopathy,  concerning for pancreatic malignancy and stacey metastatic disease. GI  evaluation.  3. Prostatomegaly.  4. Bladder wall thickening may be related to underdistention and/or  chronic outlet obstruction.     Call Report: Dr. Lopez was notified by telephone of the above findings  on June 6, 2025 at 10:30 a.m.     This report was finalized on 6/6/2025 10:33 AM by Dr. Al Rodriguez M.D on Workstation: [id number]       Scheduled Medications  amLODIPine, 5 mg, Oral, Daily  bisacodyl, 10 mg, Rectal, Daily  finasteride, 5 mg, Oral, Daily  lisinopril, 40 mg, Oral, Daily  metoprolol tartrate, 25 mg, Oral, BID  pantoprazole, 40 mg, Oral, Q AM  polyethylene glycol, 17 g, Oral, Daily  rosuvastatin, 20 mg, Oral, Daily  senna-docusate sodium, 2 tablet, Oral, Nightly  sodium chloride, 10 mL, Intravenous, Q12H  tamsulosin, 0.4 mg, Oral, Every Other Day  timolol, 1 drop, Both Eyes, BID    Infusions  lactated ringers, 100 mL/hr, Last Rate: 100 mL/hr (06/08/25 0847)    Diet  Diet: Gastrointestinal; Fiber-Restricted, Low Irritant; Texture: Soft to Chew (NDD 3); Soft to Chew: Whole Meat; Fluid  Consistency: Thin (IDDSI 0)       Assessment/Plan     Active Hospital Problems    Diagnosis  POA    **Pancreatitis, acute [K85.90]  Yes    Abdominal pain [R10.9]  Yes    Chronic anticoagulation [Z79.01]  Not Applicable    POLY (obstructive sleep apnea) [G47.33]  Yes    Paroxysmal atrial fibrillation [I48.0]  Yes    Benign prostatic hyperplasia with lower urinary tract symptoms [N40.1]  Yes    Mixed hyperlipidemia [E78.2]  Yes    Essential hypertension [I10]  Yes      Resolved Hospital Problems   No resolved problems to display.       65 y.o. male admitted with Pancreatitis, acute.    Acute pancreatitis-clinically improving. Lipase is improving as well. Will advance diet to GI soft low fat. Check lipids.  Pancreatic head [id number] is normal. GI is considering endoscopic ultrasound with FNA either as an inpatient or outpatient  Constipation-improving with aggressive bowel regimen  Hypertension-well controlled  Hyperlipidemia-rosuvastatin  Paroxysmal afib-metoprolol for rate control. Eliquis held acutely for possible procedure  GERD-ppi  POLY-pap if available  BPH-tamsulosin, finasteride  SCDs for DVT prophylaxis.  Full code.  Discussed with patient and nursing staff.  Anticipate discharge home when cleared by consultants.      Alejandro Wing MD  [facility]  06/08/25  12:53 EDT

## 2025-06-08 NOTE — PLAN OF CARE
Goal Outcome Evaluation:      Pt able to have large bowel movement overnight. Reports feeling better and less distention in his abdomen. Pt reports improvement of nausea after bowel movemet

## 2025-06-09 ENCOUNTER — READMISSION MANAGEMENT (OUTPATIENT)
Dept: CALL CENTER | Facility: HOSPITAL | Age: 65
End: 2025-06-09
Payer: MEDICARE

## 2025-06-09 VITALS
TEMPERATURE: 99.1 F | SYSTOLIC BLOOD PRESSURE: 140 MMHG | OXYGEN SATURATION: 95 % | HEART RATE: 70 BPM | HEIGHT: 73 IN | BODY MASS INDEX: 25 KG/M2 | RESPIRATION RATE: 16 BRPM | WEIGHT: 188.6 LBS | DIASTOLIC BLOOD PRESSURE: 84 MMHG

## 2025-06-09 PROBLEM — K85.90 PANCREATITIS, ACUTE: Status: RESOLVED | Noted: 2025-06-06 | Resolved: 2025-06-09

## 2025-06-09 PROBLEM — R10.9 ABDOMINAL PAIN: Status: RESOLVED | Noted: 2025-06-06 | Resolved: 2025-06-09

## 2025-06-09 LAB
ALBUMIN SERPL-MCNC: 3.9 G/DL (ref 3.5–5.2)
ALBUMIN SERPL-MCNC: 4.1 G/DL (ref 3.5–5.2)
ALBUMIN/GLOB SERPL: 1.3 G/DL
ALBUMIN/GLOB SERPL: 1.3 G/DL
ALP SERPL-CCNC: 107 U/L (ref 39–117)
ALP SERPL-CCNC: 95 U/L (ref 39–117)
ALT SERPL W P-5'-P-CCNC: 16 U/L (ref 1–41)
ALT SERPL W P-5'-P-CCNC: 18 U/L (ref 1–41)
ANION GAP SERPL CALCULATED.3IONS-SCNC: 10.1 MMOL/L (ref 5–15)
ANION GAP SERPL CALCULATED.3IONS-SCNC: 11.5 MMOL/L (ref 5–15)
AST SERPL-CCNC: 16 U/L (ref 1–40)
AST SERPL-CCNC: 17 U/L (ref 1–40)
BILIRUB SERPL-MCNC: 0.7 MG/DL (ref 0–1.2)
BILIRUB SERPL-MCNC: 0.9 MG/DL (ref 0–1.2)
BUN SERPL-MCNC: 10 MG/DL (ref 8–23)
BUN SERPL-MCNC: 11 MG/DL (ref 8–23)
BUN/CREAT SERPL: 13.7 (ref 7–25)
BUN/CREAT SERPL: 13.9 (ref 7–25)
CALCIUM SPEC-SCNC: 8.9 MG/DL (ref 8.6–10.5)
CALCIUM SPEC-SCNC: 9.3 MG/DL (ref 8.6–10.5)
CHLORIDE SERPL-SCNC: 102 MMOL/L (ref 98–107)
CHLORIDE SERPL-SCNC: 104 MMOL/L (ref 98–107)
CHOLEST SERPL-MCNC: 127 MG/DL (ref 0–200)
CO2 SERPL-SCNC: 21.5 MMOL/L (ref 22–29)
CO2 SERPL-SCNC: 21.9 MMOL/L (ref 22–29)
CREAT SERPL-MCNC: 0.73 MG/DL (ref 0.76–1.27)
CREAT SERPL-MCNC: 0.79 MG/DL (ref 0.76–1.27)
DEPRECATED RDW RBC AUTO: 47.8 FL (ref 37–54)
EGFRCR SERPLBLD CKD-EPI 2021: 97.4 ML/MIN/1.73
EGFRCR SERPLBLD CKD-EPI 2021: 99.7 ML/MIN/1.73
ERYTHROCYTE [DISTWIDTH] IN BLOOD BY AUTOMATED COUNT: 13.3 % (ref 12.3–15.4)
GLOBULIN UR ELPH-MCNC: 3.1 GM/DL
GLOBULIN UR ELPH-MCNC: 3.1 GM/DL
GLUCOSE SERPL-MCNC: 109 MG/DL (ref 65–99)
GLUCOSE SERPL-MCNC: 116 MG/DL (ref 65–99)
HCT VFR BLD AUTO: 42.8 % (ref 37.5–51)
HDLC SERPL-MCNC: 38 MG/DL (ref 40–60)
HGB BLD-MCNC: 14.6 G/DL (ref 13–17.7)
LDLC SERPL CALC-MCNC: 73 MG/DL (ref 0–100)
LDLC/HDLC SERPL: 1.93 {RATIO}
MAGNESIUM SERPL-MCNC: 2.3 MG/DL (ref 1.6–2.4)
MCH RBC QN AUTO: 32.8 PG (ref 26.6–33)
MCHC RBC AUTO-ENTMCNC: 34.1 G/DL (ref 31.5–35.7)
MCV RBC AUTO: 96.2 FL (ref 79–97)
PHOSPHATE SERPL-MCNC: 2.5 MG/DL (ref 2.5–4.5)
PLATELET # BLD AUTO: 228 10*3/MM3 (ref 140–450)
PMV BLD AUTO: 10.1 FL (ref 6–12)
POTASSIUM SERPL-SCNC: 3.7 MMOL/L (ref 3.5–5.2)
POTASSIUM SERPL-SCNC: 3.8 MMOL/L (ref 3.5–5.2)
PROT SERPL-MCNC: 7 G/DL (ref 6–8.5)
PROT SERPL-MCNC: 7.2 G/DL (ref 6–8.5)
RBC # BLD AUTO: 4.45 10*6/MM3 (ref 4.14–5.8)
SODIUM SERPL-SCNC: 135 MMOL/L (ref 136–145)
SODIUM SERPL-SCNC: 136 MMOL/L (ref 136–145)
TRIGL SERPL-MCNC: 78 MG/DL (ref 0–150)
VLDLC SERPL-MCNC: 16 MG/DL (ref 5–40)
WBC NRBC COR # BLD AUTO: 10.81 10*3/MM3 (ref 3.4–10.8)

## 2025-06-09 PROCEDURE — 82787 IGG 1 2 3 OR 4 EACH: CPT | Performed by: INTERNAL MEDICINE

## 2025-06-09 PROCEDURE — 84100 ASSAY OF PHOSPHORUS: CPT | Performed by: STUDENT IN AN ORGANIZED HEALTH CARE EDUCATION/TRAINING PROGRAM

## 2025-06-09 PROCEDURE — 83735 ASSAY OF MAGNESIUM: CPT | Performed by: STUDENT IN AN ORGANIZED HEALTH CARE EDUCATION/TRAINING PROGRAM

## 2025-06-09 PROCEDURE — 80053 COMPREHEN METABOLIC PANEL: CPT | Performed by: STUDENT IN AN ORGANIZED HEALTH CARE EDUCATION/TRAINING PROGRAM

## 2025-06-09 PROCEDURE — 86038 ANTINUCLEAR ANTIBODIES: CPT | Performed by: INTERNAL MEDICINE

## 2025-06-09 PROCEDURE — 80061 LIPID PANEL: CPT | Performed by: STUDENT IN AN ORGANIZED HEALTH CARE EDUCATION/TRAINING PROGRAM

## 2025-06-09 PROCEDURE — 85027 COMPLETE CBC AUTOMATED: CPT | Performed by: STUDENT IN AN ORGANIZED HEALTH CARE EDUCATION/TRAINING PROGRAM

## 2025-06-09 RX ORDER — MAGNESIUM OXIDE 400 MG/1
400 TABLET ORAL DAILY
Qty: 30 TABLET | Refills: 0 | Status: SHIPPED | OUTPATIENT
Start: 2025-06-09

## 2025-06-09 RX ORDER — POLYETHYLENE GLYCOL 3350 17 G/17G
17 POWDER, FOR SOLUTION ORAL DAILY
Qty: 510 G | Refills: 0 | Status: SHIPPED | OUTPATIENT
Start: 2025-06-10

## 2025-06-09 RX ORDER — ONDANSETRON 4 MG/1
4 TABLET, ORALLY DISINTEGRATING ORAL EVERY 8 HOURS PRN
Qty: 10 TABLET | Refills: 0 | Status: SHIPPED | OUTPATIENT
Start: 2025-06-09

## 2025-06-09 RX ADMIN — TIMOLOL MALEATE 1 DROP: 5 SOLUTION OPHTHALMIC at 08:15

## 2025-06-09 RX ADMIN — METOPROLOL TARTRATE 25 MG: 25 TABLET, FILM COATED ORAL at 08:14

## 2025-06-09 RX ADMIN — POLYETHYLENE GLYCOL 3350 17 G: 17 POWDER, FOR SOLUTION ORAL at 08:14

## 2025-06-09 RX ADMIN — LISINOPRIL 40 MG: 20 TABLET ORAL at 08:14

## 2025-06-09 RX ADMIN — FINASTERIDE 5 MG: 5 TABLET, FILM COATED ORAL at 08:15

## 2025-06-09 RX ADMIN — ROSUVASTATIN CALCIUM 20 MG: 20 TABLET, FILM COATED ORAL at 08:14

## 2025-06-09 RX ADMIN — AMLODIPINE BESYLATE 5 MG: 5 TABLET ORAL at 08:15

## 2025-06-09 RX ADMIN — Medication 10 ML: at 08:15

## 2025-06-09 RX ADMIN — PANTOPRAZOLE SODIUM 40 MG: 40 TABLET, DELAYED RELEASE ORAL at 05:42

## 2025-06-09 RX ADMIN — BISACODYL 10 MG: 10 SUPPOSITORY RECTAL at 08:14

## 2025-06-09 NOTE — OUTREACH NOTE
Prep Survey      Flowsheet Row Responses   [facility] [place], operated by [facility] patient discharged from? [facility]   Is LACE score < 7 ? Yes   Eligibility [facility]   Date of Admission 06/06/25   Date of Discharge 06/09/25   Discharge Disposition Home or Self Care   Discharge diagnosis Pancreatitis, acute   Does the patient have one of the following disease processes/diagnoses(primary or secondary)? Other   Does the patient have Home health ordered? No   Is there a DME ordered? No   Prep survey completed? Yes            Mary Abarca Registered Nurse

## 2025-06-09 NOTE — DISCHARGE SUMMARY
Patient Name: Elia Walsh  : 1960  MRN: [id number]    Date of Admission: 2025  Date of Discharge:  2025  Primary Care Physician: Vinh Rooney MD      Chief Complaint:   Abdominal Pain      Discharge Diagnoses     Active Hospital Problems    Diagnosis  POA    Chronic anticoagulation [Z79.01]  Not Applicable    POLY (obstructive sleep apnea) [G47.33]  Yes    Paroxysmal atrial fibrillation [I48.0]  Yes    Benign prostatic hyperplasia with lower urinary tract symptoms [N40.1]  Yes    Mixed hyperlipidemia [E78.2]  Yes    Essential hypertension [I10]  Yes      Resolved Hospital Problems    Diagnosis Date Resolved POA    **Pancreatitis, acute [K85.90] 2025 Yes    Abdominal pain [R10.9] 2025 Yes        Hospital Course     Mr. Walsh is a 65 y.o. male with a history of hypertension, hyperlipidemia, paroxysmal A-fib, GERD, POLY, BPH who presented to [facility] initially complaining of epigastric abdominal pain, bloating, early satiety.  Please see the admitting history and physical for further details.  He was found to have acute pancreatitis as well as a pancreatic head mass and was admitted to the hospital for further evaluation and treatment.      He was seen in consultation by gastroenterology and he was started on IV fluids and his diet was reduced.  Gastroenterology felt that the mass was rather small, and his CA 19-9 was normal.  Ultimately an outpatient endoscopic ultrasound with FNA was recommended after his pancreatitis had some time to resolve.  Also, they felt that his bloating was more likely related to constipation, and started him on an aggressive bowel regimen.  Food was gradually reintroduced, and the patient tolerated this without issue.  He will follow-up with gastroenterology in approximately a week for biopsy of the small pancreatic head mass.    Day of Discharge     Subjective:  No events overnight. He still feels somewhat bloated, but he has  relief with flatus. No abdominal pain. Tolerated dinner and breakfast    Physical Exam:  Temp:  [98.1 °F (36.7 °C)-99.1 °F (37.3 °C)] 99.1 °F (37.3 °C)  Heart Rate:  [57-70] 70  Resp:  [16-18] 16  BP: (138-149)/(72-90) 140/84  Body mass index is 24.88 kg/m².  Physical Exam  Constitutional:       General: He is not in acute distress.     Appearance: He is not toxic-appearing.   Cardiovascular:      Rate and Rhythm: Normal rate and regular rhythm.      Heart sounds: Normal heart sounds.   Pulmonary:      Effort: Pulmonary effort is normal.      Breath sounds: Normal breath sounds.   Abdominal:      General: Bowel sounds are normal. There is distension.      Palpations: Abdomen is soft.      Tenderness: There is no abdominal tenderness. There is no guarding or rebound.   Musculoskeletal:         General: No tenderness.      Right lower leg: No edema.      Left lower leg: No edema.   Neurological:      Mental Status: He is alert.   Psychiatric:         Mood and Affect: Mood normal.         Behavior: Behavior normal.         Consultants     Consult Orders (all) (From admission, onward)       Start     Ordered    06/06/25 1812  Inpatient Gastroenterology Consult  Once        Specialty:  Gastroenterology  Provider:  David Queen MD    06/06/25 1811    06/06/25 1147  Inpatient Gastroenterology Consult  Once        Specialty:  Gastroenterology  Provider:  David Queen MD    06/06/25 1146    06/06/25 1057  [facility] (on-call MD unless specified) Details  Once        Specialty:  Hospitalist  Provider:  Ronnie Herrera MD    06/06/25 1056                  Procedures     Imaging Results (All)       Procedure Component Value Units Date/Time    CT Abdomen Pelvis With Contrast [[id number]] Collected: 06/06/25 1017     Updated: 06/06/25 1036    Narrative:      CT ABDOMEN PELVIS W CONTRAST-     INDICATION: Generalized abdominal pain. Bloating.     COMPARISON: None     TECHNIQUE:  Routine CT abdomen/pelvis with IV  contrast. Coronal and sagittal  reformats. Radiation dose reduction techniques were utilized, including  automated exposure control and exposure modulation based on body size.     FINDINGS:      Lung bases: Respiratory motion artifact.     ABDOMEN: Small cyst in the inferior right hepatic lobe. Normal  gallbladder. No biliary ductal dilatation. Spleen is small in size.  Calcifications in the spleen, may be sequela of old granulomatous  infection.      Peripancreatic fat stranding. No pancreatic ductal dilatation. Suspicion  for a slightly hypoattenuating mass in the pancreatic head on series 2,  axial image 49, measuring approximately 2.5 x 2.3 cm, with enlarged  peripancreatic lymph nodes present. For example, peripancreatic  head/transverse mesocolon lymph node, series 2, axial mage 49, measures  1.4 cm. For example, suspect enlarged lymph node adjacent to the  pancreatic uncinate 8, series 2, axial mage 53, measuring 1.6 cm. For  example, periportal lymph node, series 2, axial mage 34, measuring 1.1  cm. For example, mesenteric root lymph node, series 2, axial mage 61,  measuring 1.0 cm. Possible peripancreatic lymph node or pancreatic  lobulation on series 2, axial mage 34 adjacent to the pancreatic body  measuring 1.1 cm. Normal pancreatic enhancement. No peripancreatic fluid  collection. Splenic vein is patent. No splenic artery pseudoaneurysm.     No adrenal nodules. Small renal cyst. Fat-containing mass seen in the  inferior pole left kidney, series 2, axial mage 64, measures 1.3 cm,  consistent with a renal angiomyolipoma, benign. No hydronephrosis.     Pelvis: Prostatomegaly, with the prostate measuring 5.1 cm in right to  left dimension, with nodular mass effect on and uplifting of the bladder  apex. Bladder wall thickening, with under distention. Right posterior  lateral bladder wall diverticulum. No bladder calculus. Phleboliths in  the pelvis.     Bowel: No obstruction. Normal appendix.     Abdominal  wall: Rectus diastases. Tiny fat-containing umbilical hernia.  Fat-containing right inguinal hernia.     Retroperitoneum: See above.     Vasculature: See above. Patent. No abdominal aortic aneurysm. Mild  aortoiliac atherosclerotic calcification.     Osseous structures: No destructive osseous lesions.       Impression:         1. Acute interstitial edematous pancreatitis.  2. Mass seen in the pancreatic head with peripancreatic lymphadenopathy,  concerning for pancreatic malignancy and stacey metastatic disease. GI  evaluation.  3. Prostatomegaly.  4. Bladder wall thickening may be related to underdistention and/or  chronic outlet obstruction.     Call Report: Dr. Lopez was notified by telephone of the above findings  on June 6, 2025 at 10:30 a.m.     This report was finalized on 6/6/2025 10:33 AM by Dr. Al Rodrgiuez M.D on Workstation: [id number]               Pertinent Labs     Results from last 7 days   Lab Units 06/09/25  0541 06/08/25  0103 06/07/25  0504 06/06/25  0928   WBC 10*3/mm3 10.81* 12.46* 14.52* 13.97*   HEMOGLOBIN g/dL 14.6 14.7 14.3 16.2   PLATELETS 10*3/mm3 228 221 201 219     Results from last 7 days   Lab Units 06/09/25  0541 06/08/25  0103 06/07/25  0504 06/06/25  0928   SODIUM mmol/L 136 137 135* 135*   POTASSIUM mmol/L 3.7 3.7 3.8 4.2   CHLORIDE mmol/L 104 103 102 103   CO2 mmol/L 21.9* 23.4 21.5* 21.0*   BUN mg/dL 10.0 10.0 11.0 13.0   CREATININE mg/dL 0.73* 0.72* 0.79 0.79   GLUCOSE mg/dL 109* 124* 116* 133*   Estimated Creatinine Clearance: 118.8 mL/min (A) (by C-G formula based on SCr of 0.73 mg/dL (L)).  Results from last 7 days   Lab Units 06/09/25  0541 06/08/25  0103 06/07/25  0504 06/06/25  0928   ALBUMIN g/dL 3.9 4.2 4.1 4.6   BILIRUBIN mg/dL 0.7 0.7 0.9 0.9   ALK PHOS U/L 107 102 95 108   AST (SGOT) U/L 16 16 17 25   ALT (SGPT) U/L 16 18 18 23     Results from last 7 days   Lab Units 06/09/25  0541 06/08/25  0103 06/07/25  0504 06/06/25  0928   CALCIUM mg/dL 8.9 9.2 9.3 9.7    ALBUMIN g/dL 3.9 4.2 4.1 4.6   MAGNESIUM mg/dL 2.3  --  2.1  --    PHOSPHORUS mg/dL 2.5  --  2.4*  --      Results from last 7 days   Lab Units 06/08/25  0103 06/07/25  0504 06/06/25  0928   LIPASE U/L 1,058* 1,187* 2,943*         Results from last 7 days   Lab Units 06/09/25  0541   CHOLESTEROL mg/dL 127   TRIGLYCERIDES mg/dL 78   HDL CHOL mg/dL 38*   LDL CHOL mg/dL 73           Test Results Pending at Discharge     Pending Labs       Order Current Status    PAGE by IFA, Reflex to Titer and Pattern In process    IgG 4 In process            Discharge Details        Discharge Medications        New Medications        Instructions Start Date   magnesium oxide 400 MG tablet  Commonly known as: MAG-OX   400 mg, Oral, Daily      ondansetron ODT 4 MG disintegrating tablet  Commonly known as: ZOFRAN-ODT   4 mg, Oral, Every 8 Hours PRN      polyethylene glycol 17 g packet  Commonly known as: MIRALAX   17 g, Oral, Daily   Start Date: Dary 10, 2025            Continue These Medications        Instructions Start Date   acetaminophen 500 MG tablet  Commonly known as: TYLENOL   500 mg, Every 6 Hours PRN      amLODIPine 5 MG tablet  Commonly known as: NORVASC   5 mg, Oral, Daily      apixaban 5 MG tablet tablet  Commonly known as: Eliquis   5 mg, Oral, Every 12 Hours Scheduled      finasteride 5 MG tablet  Commonly known as: PROSCAR   5 mg, Daily      icosapent ethyl 1 g capsule capsule  Commonly known as: VASCEPA   2 g, Oral, 2 Times Daily With Meals      lisinopril 40 MG tablet  Commonly known as: PRINIVIL,ZESTRIL   40 mg, Oral, Daily      metoprolol tartrate 25 MG tablet  Commonly known as: LOPRESSOR   25 mg, Oral, 2 Times Daily      multivitamin with minerals tablet tablet   1 tablet, Daily      omeprazole 40 MG capsule  Commonly known as: priLOSEC   40 mg, Oral, Every 48 Hours Scheduled      Proctofoam HC 1-1 % rectal foam  Generic drug: Hydrocort-Pramoxine (Perianal)   1 Application, Rectal, 2 Times Daily      [facility]  0.02-0.005 % solution  Generic drug: Netarsudil-Latanoprost   1 drop, Both Eyes, Nightly      rosuvastatin 20 MG tablet  Commonly known as: CRESTOR   20 mg, Oral, Daily      tamsulosin 0.4 MG capsule 24 hr capsule  Commonly known as: FLOMAX   1 capsule, Every Other Day      timolol 0.5 % ophthalmic solution  Commonly known as: TIMOPTIC   1 drop, 2 Times Daily               No Known Allergies      Discharge Disposition:  Home or Self Care    Discharge Diet:  Diet Order   Procedures    Diet: Gastrointestinal; Fiber-Restricted, Low Irritant, Fat-Restricted; Texture: Soft to Chew (NDD 3); Soft to Chew: Whole Meat; Fluid Consistency: Thin (IDDSI 0)       Discharge Activity:   Activity Instructions       Activity as Tolerated              CODE STATUS:    Code Status and Medical Interventions: CPR (Attempt to Resuscitate); Full Support   Ordered at: 06/06/25 1148     Code Status (Patient has no pulse and is not breathing):    CPR (Attempt to Resuscitate)     Medical Interventions (Patient has pulse or is breathing):    Full Support     Level Of Support Discussed With:    Patient       Future Appointments   Date Time Provider Department Center   8/7/2025  8:00 AM Therese Green APRN [id number]   8/19/2025  8:45 AM Nancy Joel PA-C [facility]     Additional Instructions for the Follow-ups that You Need to Schedule       Call MD With Problems / Concerns   As directed      Instructions: return to the hospital if you experience chest pain, shortness of breath, abdominal pain, nausea, vomiting, fevers, sweats, chills, or worsening of your symptoms    Order Comments: Instructions: return to the hospital if you experience chest pain, shortness of breath, abdominal pain, nausea, vomiting, fevers, sweats, chills, or worsening of your symptoms         Discharge Follow-up with PCP   As directed       Currently Documented PCP:    Vinh Rooney MD    PCP Phone Number:    [phone]     Follow Up  Details: 1-2 weeks        Discharge Follow-up with Specialty: gastroenterology 1 week or as otherwise directed   As directed      Specialty: gastroenterology 1 week or as otherwise directed               Follow-up Information       Vinh Rooney MD .    Specialty: Internal Medicine  Why: 1-2 weeks  Contact information:  [address]  [facility] [id number]  [phone]                             Additional Instructions for the Follow-ups that You Need to Schedule       Call MD With Problems / Concerns   As directed      Instructions: return to the hospital if you experience chest pain, shortness of breath, abdominal pain, nausea, vomiting, fevers, sweats, chills, or worsening of your symptoms    Order Comments: Instructions: return to the hospital if you experience chest pain, shortness of breath, abdominal pain, nausea, vomiting, fevers, sweats, chills, or worsening of your symptoms         Discharge Follow-up with PCP   As directed       Currently Documented PCP:    Vinh Rooney MD    PCP Phone Number:    [phone]     Follow Up Details: 1-2 weeks        Discharge Follow-up with Specialty: gastroenterology 1 week or as otherwise directed   As directed      Specialty: gastroenterology 1 week or as otherwise directed            Time Spent on Discharge:  Greater than 30 minutes      Alejandro Wing MD  [facility]  06/09/25  08:52 EDT

## 2025-06-09 NOTE — PLAN OF CARE
Goal Outcome Evaluation:      No acute events overnight. Vitals are stable. No complaints of pain. Patient expresses that he wants to go home and fears of biopsy results.

## 2025-06-10 ENCOUNTER — TRANSITIONAL CARE MANAGEMENT TELEPHONE ENCOUNTER (OUTPATIENT)
Dept: CALL CENTER | Facility: HOSPITAL | Age: 65
End: 2025-06-10
Payer: MEDICARE

## 2025-06-10 LAB — IGG4 SER-MCNC: 82 MG/DL (ref 2–96)

## 2025-06-10 NOTE — OUTREACH NOTE
Call Center TCM Note      Flowsheet Row Responses   [facility] patient discharged from? [place]   Does the patient have one of the following disease processes/diagnoses(primary or secondary)? Other   TCM attempt successful? No   Unsuccessful attempts Attempt 2            DAVID SAUER - Medical Assistant    6/10/2025, 15:59 EDT

## 2025-06-10 NOTE — OUTREACH NOTE
Call Center TCM Note      Flowsheet Row Responses   [facility] patient discharged from? [place]   Does the patient have one of the following disease processes/diagnoses(primary or secondary)? Other   TCM attempt successful? No   Unsuccessful attempts Attempt 1            DAVID Abarca Medical Assistant    6/10/2025, 11:01 EDT

## 2025-06-11 ENCOUNTER — TRANSITIONAL CARE MANAGEMENT TELEPHONE ENCOUNTER (OUTPATIENT)
Dept: CALL CENTER | Facility: HOSPITAL | Age: 65
End: 2025-06-11
Payer: MEDICARE

## 2025-06-11 LAB — ANA SER QL IF: NEGATIVE

## 2025-06-11 NOTE — OUTREACH NOTE
Call Center TCM Note      Flowsheet Row Responses   [facility] patient discharged from? [facility] [place]   Does the patient have one of the following disease processes/diagnoses(primary or secondary)? Other   TCM attempt successful? No   Unsuccessful attempts Attempt 3   Wrap up additional comments D/C DX: acute pancreatitis - Unable to reach pt x 3 attempts for TCM call. Pt is not yet scheduled for TCM APPT with PCP Dr Rooney. Discharged from [facility] 06/09/2025.            DAVID SAUER - Medical Assistant    6/11/2025, 13:49 EDT

## 2025-06-13 ENCOUNTER — OFFICE VISIT (OUTPATIENT)
Dept: FAMILY MEDICINE CLINIC | Facility: CLINIC | Age: 65
End: 2025-06-13
Payer: MEDICARE

## 2025-06-13 VITALS
SYSTOLIC BLOOD PRESSURE: 122 MMHG | OXYGEN SATURATION: 98 % | RESPIRATION RATE: 16 BRPM | WEIGHT: 189 LBS | HEIGHT: 73 IN | HEART RATE: 70 BPM | BODY MASS INDEX: 25.05 KG/M2 | DIASTOLIC BLOOD PRESSURE: 78 MMHG

## 2025-06-13 DIAGNOSIS — K85.90 ACUTE PANCREATITIS, UNSPECIFIED COMPLICATION STATUS, UNSPECIFIED PANCREATITIS TYPE: Primary | ICD-10-CM

## 2025-06-13 DIAGNOSIS — K86.89 PANCREATIC MASS: ICD-10-CM

## 2025-06-13 LAB — AMYLASE SERPL-CCNC: 141 U/L (ref 28–100)

## 2025-06-13 NOTE — PROGRESS NOTES
Transitional Care Follow Up Visit  Subjective     Elia Walsh is a 65 y.o. male who presents for a transitional care management visit.    Within 48 business hours after discharge our office contacted him via telephone to coordinate his care and needs.      I reviewed and discussed the details of that call along with the discharge summary, hospital problems, inpatient lab results, inpatient diagnostic studies, and consultation reports with Elia.     Current outpatient and discharge medications have been reconciled for the patient.  Reviewed by: Vinh Rooney MD          6/9/2025     6:46 PM   Date of TCM Phone Call   [facility]   Date of Admission 6/6/2025   Date of Discharge 6/9/2025   Discharge Disposition Home or Self Care     Risk for Readmission (LACE) Score: 6 (6/9/2025  6:00 AM)      History of Present Illness   Course During Hospital Stay: Godfrey is here today for hospital follow-up.  He was admitted to the hospital on June 6 with abdominal pain.  In the emergency room his lipase was quite elevated.  He was treated with intravenous fluids and gut rest.  His CT scan of the abdomen showed a pancreatic mass.  His CA 19-9 and CEA levels were normal.  His initial white blood cell count was elevated and did rise a little bit but then felt almost normal by the time of discharge.  He did not have any type of MRCP.  His lipids were normal.  He does report that he drinks alcohol but not on a daily basis.     The following portions of the patient's history were reviewed and updated as appropriate: He  has a past medical history of Allergic, Anticoagulated, BPH (benign prostatic hyperplasia), Cataract, DJD (degenerative joint disease), Essential hypertension, benign, GERD (gastroesophageal reflux disease), Glaucoma, Grief reaction, Impaired glucose metabolism (2019), Marijuana use, continuous, Mixed hyperlipidemia, POLY (obstructive sleep apnea) (2020), PAF (paroxysmal atrial  fibrillation), and Rectal bleeding.  He does not have any pertinent problems on file.  Current Outpatient Medications   Medication Sig Dispense Refill    acetaminophen (TYLENOL) 500 MG tablet Take 1 tablet by mouth Every 6 (Six) Hours As Needed for Mild Pain.      amLODIPine (NORVASC) 5 MG tablet Take 1 tablet by mouth Daily. 90 tablet 1    apixaban (Eliquis) 5 MG tablet tablet Take 1 tablet by mouth Every 12 (Twelve) Hours. 180 tablet 1    finasteride (PROSCAR) 5 MG tablet Take 1 tablet by mouth Daily.      Hydrocort-Pramoxine, Perianal, (Proctofoam HC) 1-1 % rectal foam Insert 1 Application into the rectum 2 (Two) Times a Day. 10 g 2    icosapent ethyl (VASCEPA) 1 g capsule capsule TAKE 2 CAPSULES BY MOUTH TWICE A DAY WITH MEALS 360 capsule 1    lisinopril (PRINIVIL,ZESTRIL) 40 MG tablet TAKE ONE TABLET BY MOUTH DAILY 90 tablet 1    magnesium oxide (MAG-OX) 400 MG tablet Take 1 tablet by mouth Daily. 30 tablet 0    metoprolol tartrate (LOPRESSOR) 25 MG tablet Take 1 tablet by mouth 2 (Two) Times a Day. 180 tablet 1    Multiple Vitamins-Minerals (MULTIVITAL PO) Take 1 tablet by mouth Daily.      omeprazole (priLOSEC) 40 MG capsule TAKE 1 CAPSULE BY MOUTH EVERY OTHER DAY 45 capsule 0    ondansetron ODT (ZOFRAN-ODT) 4 MG disintegrating tablet Place 1 tablet under the tongue Every 8 (Eight) Hours As Needed for Nausea or Vomiting. 10 tablet 0    polyethylene glycol (MIRALAX) 17 GM/SCOOP powder Mix one capful (17 g) in liquid and take by mouth Daily. 510 g 0    [place] 0.02-0.005 % solution Administer 1 Application to both eyes Every Night.      rosuvastatin (CRESTOR) 20 MG tablet Take 1 tablet by mouth Daily. 90 tablet 1    tamsulosin (FLOMAX) 0.4 MG capsule 24 hr capsule Take 1 capsule by mouth Every Other Day.      timolol (TIMOPTIC) 0.5 % ophthalmic solution Administer 1 drop to both eyes 2 (Two) Times a Day.       No current facility-administered medications for this visit.     He has no known  "allergies..    Review of Systems   Constitutional:  Positive for appetite change.   Gastrointestinal:  Positive for abdominal pain.        He is still experiencing some abdominal discomfort and poor appetite     Objective   /78   Pulse 70   Resp 16   Ht 185.4 cm (72.99\")   Wt 85.7 kg (189 lb)   SpO2 98%   BMI 24.94 kg/m²   Physical Exam  Vitals and nursing note reviewed.   Cardiovascular:      Rate and Rhythm: Normal rate.   Pulmonary:      Effort: Pulmonary effort is normal.   Abdominal:      Tenderness: There is abdominal tenderness. There is guarding. There is no rebound.      Comments: He has some tenderness still in the mid epigastrium with some voluntary guarding     Assessment & Plan   Diagnoses and all orders for this visit:    1. Acute pancreatitis, unspecified complication status, unspecified pancreatitis type (Primary)  -     Lipase  -     Amylase    Godfrey is here today for follow-up on his pancreatitis.  I am going to repeat his lipase.  If it is remaining elevated we may consider trying to get an MRCP while awaiting the endoscopic ultrasound biopsy.               "

## 2025-06-14 LAB — LIPASE SERPL-CCNC: 397 U/L (ref 13–60)

## 2025-06-19 ENCOUNTER — PATIENT OUTREACH (OUTPATIENT)
Dept: CASE MANAGEMENT | Facility: OTHER | Age: 65
End: 2025-06-19
Payer: MEDICARE

## 2025-06-19 NOTE — OUTREACH NOTE
"AMBULATORY CASE MANAGEMENT NOTE    Names and Relationships of Patient/Support Persons: Contact: [facility], Elia WISE \"Godfrey\"; Relationship: Self -     Patient Outreach  ACM has been unable to reach patient x3 follow recent hospitalization. Patient has seen PCP for hospital follow up.           Kelly BOLDEN  Ambulatory Case Management    6/19/2025, 14:13 EDT  "

## 2025-06-26 RX ORDER — METOPROLOL TARTRATE 25 MG/1
25 TABLET, FILM COATED ORAL 2 TIMES DAILY
Qty: 180 TABLET | Refills: 1 | Status: SHIPPED | OUTPATIENT
Start: 2025-06-26

## 2025-07-03 ENCOUNTER — ON CAMPUS - OUTPATIENT (OUTPATIENT)
Dept: URBAN - METROPOLITAN AREA HOSPITAL 85 | Facility: HOSPITAL | Age: 65
End: 2025-07-03
Payer: MEDICARE

## 2025-07-03 ENCOUNTER — ANESTHESIA EVENT (OUTPATIENT)
Dept: GASTROENTEROLOGY | Facility: HOSPITAL | Age: 65
End: 2025-07-03
Payer: MEDICARE

## 2025-07-03 ENCOUNTER — HOSPITAL ENCOUNTER (OUTPATIENT)
Facility: HOSPITAL | Age: 65
Setting detail: HOSPITAL OUTPATIENT SURGERY
Discharge: HOME | End: 2025-07-03
Attending: INTERNAL MEDICINE | Admitting: INTERNAL MEDICINE
Payer: MEDICARE

## 2025-07-03 ENCOUNTER — ANESTHESIA (OUTPATIENT)
Dept: GASTROENTEROLOGY | Facility: HOSPITAL | Age: 65
End: 2025-07-03
Payer: MEDICARE

## 2025-07-03 ENCOUNTER — TELEPHONE (OUTPATIENT)
Dept: CARDIOLOGY | Age: 65
End: 2025-07-03
Payer: MEDICARE

## 2025-07-03 VITALS
SYSTOLIC BLOOD PRESSURE: 112 MMHG | BODY MASS INDEX: 23.49 KG/M2 | DIASTOLIC BLOOD PRESSURE: 57 MMHG | HEIGHT: 74 IN | RESPIRATION RATE: 16 BRPM | OXYGEN SATURATION: 96 % | HEART RATE: 56 BPM | TEMPERATURE: 98.2 F | WEIGHT: 183 LBS

## 2025-07-03 DIAGNOSIS — K22.89 OTHER SPECIFIED DISEASE OF ESOPHAGUS: ICD-10-CM

## 2025-07-03 DIAGNOSIS — R59.0 LOCALIZED ENLARGED LYMPH NODES: ICD-10-CM

## 2025-07-03 DIAGNOSIS — D3A.8 OTHER BENIGN NEUROENDOCRINE TUMORS: ICD-10-CM

## 2025-07-03 DIAGNOSIS — K44.9 DIAPHRAGMATIC HERNIA WITHOUT OBSTRUCTION OR GANGRENE: ICD-10-CM

## 2025-07-03 DIAGNOSIS — K86.89 PANCREATIC MASS: ICD-10-CM

## 2025-07-03 DIAGNOSIS — K86.89 OTHER SPECIFIED DISEASES OF PANCREAS: ICD-10-CM

## 2025-07-03 PROCEDURE — 43242 EGD US FINE NEEDLE BX/ASPIR: CPT | Performed by: INTERNAL MEDICINE

## 2025-07-03 PROCEDURE — 25810000003 SODIUM CHLORIDE 0.9 % SOLUTION: Performed by: INTERNAL MEDICINE

## 2025-07-03 PROCEDURE — 43239 EGD BIOPSY SINGLE/MULTIPLE: CPT | Mod: 59 | Performed by: INTERNAL MEDICINE

## 2025-07-03 PROCEDURE — 25010000002 METRONIDAZOLE 500 MG/100ML SOLUTION: Performed by: NURSE ANESTHETIST, CERTIFIED REGISTERED

## 2025-07-03 PROCEDURE — 25010000002 PROPOFOL 200 MG/20ML EMULSION: Performed by: NURSE ANESTHETIST, CERTIFIED REGISTERED

## 2025-07-03 PROCEDURE — 88172 CYTP DX EVAL FNA 1ST EA SITE: CPT | Performed by: INTERNAL MEDICINE

## 2025-07-03 PROCEDURE — 25810000003 SODIUM CHLORIDE 0.9 % SOLUTION: Performed by: NURSE ANESTHETIST, CERTIFIED REGISTERED

## 2025-07-03 PROCEDURE — 25010000002 LIDOCAINE PF 2% 2 % SOLUTION: Performed by: NURSE ANESTHETIST, CERTIFIED REGISTERED

## 2025-07-03 PROCEDURE — 88305 TISSUE EXAM BY PATHOLOGIST: CPT | Performed by: INTERNAL MEDICINE

## 2025-07-03 PROCEDURE — 88342 IMHCHEM/IMCYTCHM 1ST ANTB: CPT | Performed by: INTERNAL MEDICINE

## 2025-07-03 PROCEDURE — 88360 TUMOR IMMUNOHISTOCHEM/MANUAL: CPT

## 2025-07-03 PROCEDURE — 88341 IMHCHEM/IMCYTCHM EA ADD ANTB: CPT | Performed by: INTERNAL MEDICINE

## 2025-07-03 PROCEDURE — 25010000002 CEFTRIAXONE PER 250 MG: Performed by: NURSE ANESTHETIST, CERTIFIED REGISTERED

## 2025-07-03 PROCEDURE — 88360 TUMOR IMMUNOHISTOCHEM/MANUAL: CPT | Performed by: INTERNAL MEDICINE

## 2025-07-03 RX ORDER — METRONIDAZOLE 500 MG/100ML
INJECTION, SOLUTION INTRAVENOUS
Status: COMPLETED
Start: 2025-07-03 | End: 2025-07-03

## 2025-07-03 RX ORDER — CEFTRIAXONE 2 G/1
INJECTION, POWDER, FOR SOLUTION INTRAMUSCULAR; INTRAVENOUS
Status: DISCONTINUED
Start: 2025-07-03 | End: 2025-07-03 | Stop reason: HOSPADM

## 2025-07-03 RX ORDER — PROPOFOL 10 MG/ML
INJECTION, EMULSION INTRAVENOUS AS NEEDED
Status: DISCONTINUED | OUTPATIENT
Start: 2025-07-03 | End: 2025-07-03 | Stop reason: SURG

## 2025-07-03 RX ORDER — METRONIDAZOLE 500 MG/100ML
INJECTION, SOLUTION INTRAVENOUS AS NEEDED
Status: DISCONTINUED | OUTPATIENT
Start: 2025-07-03 | End: 2025-07-03 | Stop reason: SURG

## 2025-07-03 RX ORDER — SODIUM CHLORIDE 9 MG/ML
50 INJECTION, SOLUTION INTRAVENOUS CONTINUOUS
Status: DISCONTINUED | OUTPATIENT
Start: 2025-07-03 | End: 2025-07-03 | Stop reason: HOSPADM

## 2025-07-03 RX ORDER — SODIUM CHLORIDE 9 MG/ML
INJECTION, SOLUTION INTRAVENOUS
Status: COMPLETED
Start: 2025-07-03 | End: 2025-07-03

## 2025-07-03 RX ORDER — SODIUM CHLORIDE 9 MG/ML
INJECTION, SOLUTION INTRAVENOUS CONTINUOUS PRN
Status: DISCONTINUED | OUTPATIENT
Start: 2025-07-03 | End: 2025-07-03 | Stop reason: SURG

## 2025-07-03 RX ORDER — LIDOCAINE HYDROCHLORIDE 20 MG/ML
INJECTION, SOLUTION EPIDURAL; INFILTRATION; INTRACAUDAL; PERINEURAL AS NEEDED
Status: DISCONTINUED | OUTPATIENT
Start: 2025-07-03 | End: 2025-07-03 | Stop reason: SURG

## 2025-07-03 RX ORDER — ONDANSETRON 2 MG/ML
4 INJECTION INTRAMUSCULAR; INTRAVENOUS ONCE AS NEEDED
Status: DISCONTINUED | OUTPATIENT
Start: 2025-07-03 | End: 2025-07-03 | Stop reason: HOSPADM

## 2025-07-03 RX ADMIN — METRONIDAZOLE 500 MG: 500 INJECTION, SOLUTION INTRAVENOUS at 10:30

## 2025-07-03 RX ADMIN — CEFTRIAXONE SODIUM 2 G: 2 INJECTION, POWDER, FOR SOLUTION INTRAMUSCULAR; INTRAVENOUS at 10:34

## 2025-07-03 RX ADMIN — PROPOFOL 150 MG: 10 INJECTION, EMULSION INTRAVENOUS at 10:16

## 2025-07-03 RX ADMIN — SODIUM CHLORIDE 50 ML/HR: 9 INJECTION, SOLUTION INTRAVENOUS at 09:20

## 2025-07-03 RX ADMIN — LIDOCAINE HYDROCHLORIDE 100 MG: 20 INJECTION, SOLUTION EPIDURAL; INFILTRATION; INTRACAUDAL; PERINEURAL at 10:16

## 2025-07-03 RX ADMIN — PROPOFOL 200 MG: 10 INJECTION, EMULSION INTRAVENOUS at 10:33

## 2025-07-03 RX ADMIN — PROPOFOL 200 MG: 10 INJECTION, EMULSION INTRAVENOUS at 10:27

## 2025-07-03 RX ADMIN — PROPOFOL 50 MG: 10 INJECTION, EMULSION INTRAVENOUS at 10:20

## 2025-07-03 RX ADMIN — SODIUM CHLORIDE: 9 INJECTION, SOLUTION INTRAVENOUS at 10:14

## 2025-07-03 NOTE — ANESTHESIA POSTPROCEDURE EVALUATION
Patient: Elia Walsh    Procedure Summary       Date: 07/03/25 Room / Location: [facility] ENDOSCOPY 2 / [facility] ENDOSCOPY    Anesthesia Start: 1014 Anesthesia Stop: 1047    Procedure: ENDOSCOPIC ULTRASOUND, biopsy distal esophagus, fine needle aspiration pancreatic head mass and peripancreatic lymph node Diagnosis:       Pancreatic mass      (Pancreatic mass [K86.89])    Surgeons: Graham Luke MD Provider: Therese Gonzalez MD    Anesthesia Type: general ASA Status: 3            Anesthesia Type: general    Vitals  Vitals Value Taken Time   BP 81/46 07/03/25 10:51   Temp     Pulse 54 07/03/25 10:53   Resp     SpO2 97 % 07/03/25 10:53   Vitals shown include unfiled device data.        Post Anesthesia Care and Evaluation    Patient location during evaluation: PACU  Patient participation: complete - patient participated  Level of consciousness: awake and alert  Pain management: satisfactory to patient    Airway patency: patent  Anesthetic complications: No anesthetic complications  PONV Status: none  Cardiovascular status: acceptable  Respiratory status: acceptable  Hydration status: acceptable

## 2025-07-03 NOTE — OP NOTE
"ESOPHAGOGASTRODUODENOSCOPY WITH ULTRASOUND AND FINE NEEDLE ASPIRATION Procedure Report    Patient Name:  Elia Walsh  YOB: 1960    Date of Surgery:  7/3/2025     Pre-Op Diagnosis:  Pancreatic mass [K86.89]    Postop diagnosis:  1.  Hiatal hernia  2.  Irregular Z-line  3.  Pancreatic head mass  4.  Peripancreatic lymphadenopathy      Procedure/CPT® Codes:  MN EGD INTRMURAL NEEDLE ASPIR/BIOP ALTERED ANATOMY [29289]    Procedure(s):  ENDOSCOPIC ULTRASOUND, biopsy distal esophagus, fine needle aspiration pancreatic head mass and peripancreatic lymph node    Staff:  Surgeon(s):  Graham Luke MD      Anesthesia: Monitored Anesthesia Care    Description of Procedure:  A description of the procedure as well as risks, benefits and alternative methods were explained to the patient who voiced understanding and signed the corresponding consent form. A physical exam was performed and vital signs were monitored throughout the procedure.    An upper GI endoscope was placed into the mouth and proceeded through the esophagus, stomach and second portion of the duodenum without difficulty. The scope was then retroflexed and the fundus was visualized. The procedure was not difficult and there were no immediate complications.    Next, a Linear echoendoscope was advanced into the mouth, esophagus, and into the stomach. The celiac axis was visualized, next the scope was used to visualize the pancreatic neck, body, and tail as well as the L lobe of the liver. The scope was advanced into the duodenal bulb where the pancreatic head and ampulla were visualized as well as the biliary tree and R lobe of the liver. The scope was then advanced to the second portion of the duodenum where the uncinate was brought into view and the ampulla in the \" kissing the papilla\" view.  FNB x 1 using a 22-gauge acquire as needle was performed with a single 1 cm core of tissue obtained.  Touch prep was performed on slides and the core was " placed in formalin for cellblock.  A new needle was then used which was a 25-gauge acquire needle which was advanced into a large peripancreatic lymph node adjacent to the mass measuring 1.4 cm x 1 and slides made at the bedside that were positive for malignancy.  There was no blood loss.       Impression:    EGD Findings:   1.  Schatzki's ring that was nonobstructing at the GE junction  2.  A small sliding hiatal hernia was present in the cardia  3.  Irregular Z-line concerning for underlying Leach's esophagus, cold forcep biopsies were taken to confirm  4.  Normal gastric mucosa entire stomach  5.  Normal duodenum mucosa visualized to D2    EUS Findings:   1.  4.1 cm x 3.5 cm hypoechoic lesion was present in the pancreatic head.  It was abutting but not invading large vasculature including the portal vein.  FNB x 1 using a 22-gauge acquire as needle performed with a single core obtained with touch prep positive for malignancy and core placed in cell block.  2.  At least 2 lymph nodes were visualized adjacent to the pancreatic mass.  The largest measured 1.4 cm and was hypoechoic.  FNB of x 1 using a 25-gauge acquire is needle performed and rapid onsite cytology was positive for malignancy.  3.  No obvious liver lesions visualized from what can be seen  4.  Mildly diffuse hypoechoic pancreas likely from mild upstream obstruction.  The pancreatic duct was mildly dilated throughout the entire pancreas.  5.  Normal celiac axis    Recommendations:  1.  Hold Eliquis for 2 to 3 days longer if possible.  2.  Pancreaticobiliary oncology surgeon referral to Dr. Moses  3.  Oncology referral to Dr. Scott as requested by family      Graham Luke MD     Date: 7/3/2025    Time: 12:11 EDT

## 2025-07-03 NOTE — ANESTHESIA PREPROCEDURE EVALUATION
Anesthesia Evaluation     Patient summary reviewed   no history of anesthetic complications:   NPO Solid Status: > 8 hours  NPO Liquid Status: > 8 hours           Airway   Mallampati: II  TM distance: >3 FB  Neck ROM: full  Dental - normal exam     Pulmonary - normal exam   (+) a smoker Former,sleep apnea  Cardiovascular - normal exam    (+) hypertension, dysrhythmias Atrial Fib, hyperlipidemia      Neuro/Psych  (-) CVA  GI/Hepatic/Renal/Endo    (+) GERD    Musculoskeletal     Abdominal    Substance History   (+) alcohol use, drug use (MJA)     OB/GYN          Other   arthritis,                   Anesthesia Plan    ASA 3     general   total IV anesthesia  intravenous induction     Anesthetic plan, risks, benefits, and alternatives have been provided, discussed and informed consent has been obtained with: patient.    Plan discussed with CRNA.    CODE STATUS:

## 2025-07-03 NOTE — TELEPHONE ENCOUNTER
At last office visit, he was maintaining sinus rhythm.  However, he is at significantly increased risk of stroke anytime he is off Eliquis due to his paroxysmal atrial fibrillation.  Would restart his Eliquis as soon as safely possible from a procedural standpoint.  We usually like to restart within 24 hours of procedure if there is no active bleeding.  If he has any strokelike symptoms in the meantime he should proceed to ER immediately for further evaluation.

## 2025-07-03 NOTE — DISCHARGE INSTRUCTIONS
A responsible adult should stay with you and you should rest quietly for the rest of the day.    Do not drink alcohol, drive, operate any heavy machinery or power tools or make any legal/important decisions for the next 24 hours.     If you begin to experience severe pain, increased shortness of breath, racing heartbeat or a fever above 101 F, seek immediate medical attention.     Follow up with MD as instructed. Call office for results in 3 to 5 days if needed.     Dr Luke [phone]    Pancreatic Surgeon  [facility]  [phone]    Vinh Scott  [phone]    Hold Elequis x 3 days check with cardiology

## 2025-07-03 NOTE — H&P
GI CONSULT  NOTE:    Referring Provider:    Vinh Rooney MD Obert, Jonathan, MD    Chief complaint: <principal problem not specified>    Subjective .       Pre op diagnosis  Pancreatic mass [K86.89]      History of present illness:      Elia Walsh is a 65 y.o. male who presents today for Procedure(s):  ENDOSCOPIC ULTRASOUND for the indications listed below.     The updated Patient Profile was reviewed prior to the procedure, in conjunction with the Physical Exam, including medical conditions, surgical procedures, medications, allergies, family history and social history.     Pre-operatively, I reviewed the indication(s) for the procedure, the risks of the procedure [including but not limited to: unexpected bleeding possibly requiring hospitalization and/or unplanned repeat procedures, perforation possibly requiring surgical treatment, missed lesions and complications of sedation/MAC (also explained by anesthesia staff)].     I have evaluated the patient for risks associated with the planned anesthesia and the procedure to be performed and find the patient an acceptable candidate for IV sedation.    Multiple opportunities were provided for any questions or concerns, and all questions were answered satisfactorily before any anesthesia was administered. We will proceed with the planned procedure.    Past Medical History:  Past Medical History:   Diagnosis Date    Allergic     Anticoagulated     BPH (benign prostatic hyperplasia)     Cataract     DJD (degenerative joint disease)     Essential hypertension, benign     GERD (gastroesophageal reflux disease)     Glaucoma     Grief reaction     mxl family members lost in few yrs time    Impaired glucose metabolism 2019    Marijuana use, continuous     Mixed hyperlipidemia     POLY (obstructive sleep apnea) 2020    Home sleep study failed but pt does not want cpap. was reported as mild POLY with AHI 7.4 events per hour, possibly underestimated. The patient  snored 14% of the total monitoring time.    PAF (paroxysmal atrial fibrillation)     Rectal bleeding        Past Surgical History:  Past Surgical History:   Procedure Laterality Date    COLONOSCOPY          COLONOSCOPY  2013    Jeffrey Ascencio MD    COLONOSCOPY  2006    Jeffrey Ascencio MD    ENDOSCOPY  2015    With biopsies, Al Haddad MD    ENDOSCOPY  2006    With biopsies, Jeffrey Ascencio MD    HEMORRHOIDECTOMY      HERNIA REPAIR      X2       Social History:  Social History     Tobacco Use    Smoking status: Former     Current packs/day: 0.00     Average packs/day: 1 pack/day for 25.0 years (25.0 ttl pk-yrs)     Types: Cigarettes     Start date: 1968     Quit date: 1993     Years since quittin.1    Smokeless tobacco: Never    Tobacco comments:     35 years ago   Vaping Use    Vaping status: Never Used   Substance Use Topics    Alcohol use: Yes     Alcohol/week: 4.0 - 6.0 standard drinks of alcohol     Types: 4 - 6 Cans of beer per week     Comment: socially/4 or 5/week; Daily caffeine use    Drug use: Yes     Types: Marijuana     Comment: for glaucoma       Family History:  Family History   Problem Relation Age of Onset    Hypertension Mother 54    Aneurysm Father 70       Medications:  Medications Prior to Admission   Medication Sig Dispense Refill Last Dose/Taking    acetaminophen (TYLENOL) 500 MG tablet Take 1 tablet by mouth Every 6 (Six) Hours As Needed for Mild Pain.   Past Month    amLODIPine (NORVASC) 5 MG tablet Take 1 tablet by mouth Daily. 90 tablet 1 7/3/2025 Morning    apixaban (Eliquis) 5 MG tablet tablet Take 1 tablet by mouth Every 12 (Twelve) Hours. 180 tablet 1 Past Week    finasteride (PROSCAR) 5 MG tablet Take 1 tablet by mouth Daily.   7/3/2025 Morning    Hydrocort-Pramoxine, Perianal, (Proctofoam HC) 1-1 % rectal foam Insert 1 Application into the rectum 2 (Two) Times a Day. 10 g 2 2025    icosapent ethyl (VASCEPA) 1 g capsule capsule  TAKE 2 CAPSULES BY MOUTH TWICE A DAY WITH MEALS 360 capsule 1 Past Week    lisinopril (PRINIVIL,ZESTRIL) 40 MG tablet TAKE ONE TABLET BY MOUTH DAILY 90 tablet 1 7/2/2025    magnesium oxide (MAG-OX) 400 MG tablet Take 1 tablet by mouth Daily. 30 tablet 0 7/3/2025 Morning    metoprolol tartrate (LOPRESSOR) 25 MG tablet TAKE 1 TABLET BY MOUTH 2 TIMES A  tablet 1 7/3/2025 Morning    Multiple Vitamins-Minerals (MULTIVITAL PO) Take 1 tablet by mouth Daily.   Past Month    omeprazole (priLOSEC) 40 MG capsule TAKE 1 CAPSULE BY MOUTH EVERY OTHER DAY 45 capsule 0 Past Week    ondansetron ODT (ZOFRAN-ODT) 4 MG disintegrating tablet Place 1 tablet under the tongue Every 8 (Eight) Hours As Needed for Nausea or Vomiting. 10 tablet 0 Past Month    polyethylene glycol (MIRALAX) 17 GM/SCOOP powder Mix one capful (17 g) in liquid and take by mouth Daily. 510 g 0 7/3/2025 Morning    Rocklatan 0.02-0.005 % solution Administer 1 Application to both eyes Every Night.   7/2/2025    rosuvastatin (CRESTOR) 20 MG tablet Take 1 tablet by mouth Daily. 90 tablet 1 7/3/2025 Morning    tamsulosin (FLOMAX) 0.4 MG capsule 24 hr capsule Take 1 capsule by mouth Every Other Day.   Past Week    timolol (TIMOPTIC) 0.5 % ophthalmic solution Administer 1 drop to both eyes 2 (Two) Times a Day.   7/3/2025 Morning       Scheduled Meds:cefTRIAXone, , ,   metroNIDAZOLE, , ,   sodium chloride, , ,       Continuous Infusions:sodium chloride, 50 mL/hr, Last Rate: 50 mL/hr (07/03/25 0920)      PRN Meds:.  cefTRIAXone    metroNIDAZOLE    sodium chloride    ALLERGIES:  Patient has no known allergies.    ROS:  The following systems were reviewed and negative;  Constitution:  No fevers, chills, no unintentional weight loss  Skin: no rash, no jaundice  Eyes:  No blurry vision, no eye pain  HENT:  No change in hearing or smell  Resp:  No dyspnea or cough  CV:  No chest pain or palpitations  :  No dysuria, hematuria  Musculoskeletal:  No leg cramps or  "arthralgias  Neuro:  No tremor, no numbness  Psych:  No depression or confsusion    Objective     Vital Signs:   Vitals:    06/20/25 0957 07/03/25 0907   BP:  134/71   BP Location:  Left arm   Patient Position:  Sitting   Pulse:  55   Resp:  18   Temp:  98.2 °F (36.8 °C)   TempSrc:  Oral   SpO2:  100%   Weight: 85.7 kg (189 lb) 83 kg (183 lb)   Height: 182.9 cm (72\") 188 cm (74\")       Physical Exam:       General Appearance:    Awake and alert, in no acute distress   Head:    Normocephalic, without obvious abnormality, atraumatic   Throat:   No oral lesions, no thrush, oral mucosa moist   Lungs:     respirations regular, even and unlabored   Skin:   No rash, no jaundice       Results Review:  Lab Results (last 24 hours)       ** No results found for the last 24 hours. **            Imaging Results (Last 24 Hours)       ** No results found for the last 24 hours. **             I reviewed the patient's labs and imaging.    ASSESSMENT AND PLAN:      Active Problems:    * No active hospital problems. *       Procedure(s):  ENDOSCOPIC ULTRASOUND      I discussed the patient's findings and my recommendations with the patient.    Graham Luke MD  07/03/25  10:17 EDT                "

## 2025-07-03 NOTE — TELEPHONE ENCOUNTER
Pt called re: He had a pancreatis mass and LN US and bx today.  They want him to hold his Eliquis 3 more days.  Is this ok?

## 2025-07-03 NOTE — TELEPHONE ENCOUNTER
Reviewed recommendations with patient, verbalized understanding, will call with any further questions or complaints.    Alem Samuels RN  Triage Nurse  07/03/25 15:29 EDT

## 2025-07-07 LAB
LAB AP CASE REPORT: NORMAL
LAB AP CLINICAL INFORMATION: NORMAL
PATH REPORT.FINAL DX SPEC: NORMAL
PATH REPORT.GROSS SPEC: NORMAL

## 2025-07-07 RX ORDER — AMLODIPINE BESYLATE 5 MG/1
5 TABLET ORAL DAILY
Qty: 90 TABLET | Refills: 1 | Status: SHIPPED | OUTPATIENT
Start: 2025-07-07

## 2025-07-11 ENCOUNTER — TELEPHONE (OUTPATIENT)
Dept: FAMILY MEDICINE CLINIC | Facility: CLINIC | Age: 65
End: 2025-07-11
Payer: MEDICARE

## 2025-07-11 DIAGNOSIS — K21.00 GASTROESOPHAGEAL REFLUX DISEASE WITH ESOPHAGITIS WITHOUT HEMORRHAGE: ICD-10-CM

## 2025-07-11 LAB
BEAKER LAB AP INTRAOPERATIVE CONSULTATION: NORMAL
LAB AP CASE REPORT: NORMAL
LAB AP FLOW CYTOMETRY REPORT,ADDENDUM: NORMAL
LAB AP SPECIAL STAINS: NORMAL
PATH REPORT.FINAL DX SPEC: NORMAL
PATH REPORT.GROSS SPEC: NORMAL

## 2025-07-11 RX ORDER — OMEPRAZOLE 40 MG/1
40 CAPSULE, DELAYED RELEASE ORAL
Qty: 45 CAPSULE | Refills: 0 | Status: SHIPPED | OUTPATIENT
Start: 2025-07-11

## 2025-07-11 NOTE — TELEPHONE ENCOUNTER
The following pt need clarification if he need to still take the medicine that was precribed to him by the DR  when was admitted. The following medications are magnesium oxide 400 mg and peg 3350 osmotic laxative he, seeking medical advice to determine what he should do next I explained to pateint that he should call the precrbiing doctor but he insisted on waiting for you response can you please assist w these.    A message was left that he likely does not need to magnesium oxide any longer.  He can continue to use the MiraLAX or switch it to Citrucel or Metamucil

## 2025-07-29 LAB
BEAKER LAB AP INTRAOPERATIVE CONSULTATION: NORMAL
LAB AP CASE REPORT: NORMAL
LAB AP CYTOGENETICS REPORT,ADDENDUM: NORMAL
LAB AP FLOW CYTOMETRY REPORT,ADDENDUM: NORMAL
LAB AP SPECIAL STAINS: NORMAL
PATH REPORT.FINAL DX SPEC: NORMAL
PATH REPORT.GROSS SPEC: NORMAL

## 2025-08-05 LAB
BEAKER LAB AP INTRAOPERATIVE CONSULTATION: NORMAL
LAB AP CASE REPORT: NORMAL
LAB AP CYTOGENETICS REPORT,ADDENDUM: NORMAL
LAB AP FLOW CYTOMETRY REPORT,ADDENDUM: NORMAL
LAB AP SPECIAL STAINS: NORMAL
PATH REPORT.ADDENDUM SPEC: NORMAL
PATH REPORT.FINAL DX SPEC: NORMAL
PATH REPORT.GROSS SPEC: NORMAL

## 2025-08-06 ENCOUNTER — HOSPITAL ENCOUNTER (OUTPATIENT)
Facility: HOSPITAL | Age: 65
Setting detail: HOSPITAL OUTPATIENT SURGERY
Discharge: HOME | End: 2025-08-06
Attending: INTERNAL MEDICINE | Admitting: INTERNAL MEDICINE
Payer: MEDICARE

## 2025-08-06 ENCOUNTER — ANESTHESIA EVENT (OUTPATIENT)
Dept: GASTROENTEROLOGY | Facility: HOSPITAL | Age: 65
End: 2025-08-06
Payer: MEDICARE

## 2025-08-06 ENCOUNTER — INPATIENT HOSPITAL (OUTPATIENT)
Dept: URBAN - METROPOLITAN AREA HOSPITAL 113 | Facility: HOSPITAL | Age: 65
End: 2025-08-06
Payer: MEDICARE

## 2025-08-06 ENCOUNTER — ANESTHESIA (OUTPATIENT)
Dept: GASTROENTEROLOGY | Facility: HOSPITAL | Age: 65
End: 2025-08-06
Payer: MEDICARE

## 2025-08-06 ENCOUNTER — APPOINTMENT (OUTPATIENT)
Dept: GENERAL RADIOLOGY | Facility: HOSPITAL | Age: 65
End: 2025-08-06
Payer: MEDICARE

## 2025-08-06 VITALS
DIASTOLIC BLOOD PRESSURE: 81 MMHG | BODY MASS INDEX: 22.97 KG/M2 | WEIGHT: 173.3 LBS | RESPIRATION RATE: 16 BRPM | HEIGHT: 73 IN | OXYGEN SATURATION: 100 % | HEART RATE: 52 BPM | SYSTOLIC BLOOD PRESSURE: 167 MMHG

## 2025-08-06 DIAGNOSIS — K83.1 OBSTRUCTION OF BILE DUCT: ICD-10-CM

## 2025-08-06 PROCEDURE — 74328 X-RAY BILE DUCT ENDOSCOPY: CPT

## 2025-08-06 PROCEDURE — 25010000002 FENTANYL CITRATE (PF) 100 MCG/2ML SOLUTION: Performed by: NURSE ANESTHETIST, CERTIFIED REGISTERED

## 2025-08-06 PROCEDURE — 25010000002 GLYCOPYRROLATE 0.2 MG/ML SOLUTION: Performed by: NURSE ANESTHETIST, CERTIFIED REGISTERED

## 2025-08-06 PROCEDURE — C1769 GUIDE WIRE: HCPCS | Performed by: INTERNAL MEDICINE

## 2025-08-06 PROCEDURE — 25810000003 LACTATED RINGERS PER 1000 ML: Performed by: NURSE ANESTHETIST, CERTIFIED REGISTERED

## 2025-08-06 PROCEDURE — C1889 IMPLANT/INSERT DEVICE, NOC: HCPCS | Performed by: INTERNAL MEDICINE

## 2025-08-06 PROCEDURE — 25010000002 PROPOFOL 200 MG/20ML EMULSION: Performed by: NURSE ANESTHETIST, CERTIFIED REGISTERED

## 2025-08-06 PROCEDURE — 25010000002 ONDANSETRON PER 1 MG: Performed by: NURSE ANESTHETIST, CERTIFIED REGISTERED

## 2025-08-06 PROCEDURE — 25010000002 SUGAMMADEX 200 MG/2ML SOLUTION: Performed by: NURSE ANESTHETIST, CERTIFIED REGISTERED

## 2025-08-06 PROCEDURE — 43260 ERCP W/SPECIMEN COLLECTION: CPT | Mod: 52 | Performed by: INTERNAL MEDICINE

## 2025-08-06 PROCEDURE — 25010000002 DEXAMETHASONE PER 1 MG: Performed by: NURSE ANESTHETIST, CERTIFIED REGISTERED

## 2025-08-06 PROCEDURE — 25010000002 GLUCAGON (RDNA) PER 1 MG: Performed by: INTERNAL MEDICINE

## 2025-08-06 PROCEDURE — 25010000002 LIDOCAINE 2% SOLUTION: Performed by: NURSE ANESTHETIST, CERTIFIED REGISTERED

## 2025-08-06 PROCEDURE — 25810000003 LACTATED RINGERS PER 1000 ML: Performed by: INTERNAL MEDICINE

## 2025-08-06 PROCEDURE — 25510000001 IOPAMIDOL PER 1 ML: Performed by: INTERNAL MEDICINE

## 2025-08-06 RX ORDER — DEXAMETHASONE SODIUM PHOSPHATE 4 MG/ML
INJECTION, SOLUTION INTRA-ARTICULAR; INTRALESIONAL; INTRAMUSCULAR; INTRAVENOUS; SOFT TISSUE AS NEEDED
Status: DISCONTINUED | OUTPATIENT
Start: 2025-08-06 | End: 2025-08-06 | Stop reason: SURG

## 2025-08-06 RX ORDER — ONDANSETRON 2 MG/ML
INJECTION INTRAMUSCULAR; INTRAVENOUS AS NEEDED
Status: DISCONTINUED | OUTPATIENT
Start: 2025-08-06 | End: 2025-08-06 | Stop reason: SURG

## 2025-08-06 RX ORDER — EPHEDRINE SULFATE 50 MG/ML
INJECTION, SOLUTION INTRAVENOUS AS NEEDED
Status: DISCONTINUED | OUTPATIENT
Start: 2025-08-06 | End: 2025-08-06 | Stop reason: SURG

## 2025-08-06 RX ORDER — ONDANSETRON 2 MG/ML
4 INJECTION INTRAMUSCULAR; INTRAVENOUS ONCE AS NEEDED
Status: DISCONTINUED | OUTPATIENT
Start: 2025-08-06 | End: 2025-08-06 | Stop reason: HOSPADM

## 2025-08-06 RX ORDER — SODIUM CHLORIDE, SODIUM LACTATE, POTASSIUM CHLORIDE, CALCIUM CHLORIDE 600; 310; 30; 20 MG/100ML; MG/100ML; MG/100ML; MG/100ML
1000 INJECTION, SOLUTION INTRAVENOUS CONTINUOUS
Status: DISCONTINUED | OUTPATIENT
Start: 2025-08-06 | End: 2025-08-06 | Stop reason: HOSPADM

## 2025-08-06 RX ORDER — SODIUM CHLORIDE 0.9 % (FLUSH) 0.9 %
10 SYRINGE (ML) INJECTION AS NEEDED
Status: DISCONTINUED | OUTPATIENT
Start: 2025-08-06 | End: 2025-08-06 | Stop reason: HOSPADM

## 2025-08-06 RX ORDER — LIDOCAINE HYDROCHLORIDE 10 MG/ML
0.5 INJECTION, SOLUTION INFILTRATION; PERINEURAL ONCE AS NEEDED
Status: DISCONTINUED | OUTPATIENT
Start: 2025-08-06 | End: 2025-08-06 | Stop reason: HOSPADM

## 2025-08-06 RX ORDER — ROCURONIUM BROMIDE 10 MG/ML
INJECTION, SOLUTION INTRAVENOUS AS NEEDED
Status: DISCONTINUED | OUTPATIENT
Start: 2025-08-06 | End: 2025-08-06 | Stop reason: SURG

## 2025-08-06 RX ORDER — FENTANYL CITRATE 50 UG/ML
INJECTION, SOLUTION INTRAMUSCULAR; INTRAVENOUS AS NEEDED
Status: DISCONTINUED | OUTPATIENT
Start: 2025-08-06 | End: 2025-08-06 | Stop reason: SURG

## 2025-08-06 RX ORDER — LIDOCAINE HYDROCHLORIDE 20 MG/ML
INJECTION, SOLUTION INFILTRATION; PERINEURAL AS NEEDED
Status: DISCONTINUED | OUTPATIENT
Start: 2025-08-06 | End: 2025-08-06 | Stop reason: SURG

## 2025-08-06 RX ORDER — INDOMETHACIN 100 MG
SUPPOSITORY, RECTAL RECTAL AS NEEDED
Status: DISCONTINUED | OUTPATIENT
Start: 2025-08-06 | End: 2025-08-06 | Stop reason: HOSPADM

## 2025-08-06 RX ORDER — FENTANYL CITRATE 50 UG/ML
INJECTION, SOLUTION INTRAMUSCULAR; INTRAVENOUS
Status: COMPLETED
Start: 2025-08-06 | End: 2025-08-06

## 2025-08-06 RX ORDER — GLYCOPYRROLATE 0.2 MG/ML
INJECTION INTRAMUSCULAR; INTRAVENOUS AS NEEDED
Status: DISCONTINUED | OUTPATIENT
Start: 2025-08-06 | End: 2025-08-06 | Stop reason: SURG

## 2025-08-06 RX ORDER — SODIUM CHLORIDE, SODIUM LACTATE, POTASSIUM CHLORIDE, CALCIUM CHLORIDE 600; 310; 30; 20 MG/100ML; MG/100ML; MG/100ML; MG/100ML
INJECTION, SOLUTION INTRAVENOUS CONTINUOUS PRN
Status: DISCONTINUED | OUTPATIENT
Start: 2025-08-06 | End: 2025-08-06 | Stop reason: SURG

## 2025-08-06 RX ORDER — IOPAMIDOL 755 MG/ML
INJECTION, SOLUTION INTRAVASCULAR AS NEEDED
Status: DISCONTINUED | OUTPATIENT
Start: 2025-08-06 | End: 2025-08-06 | Stop reason: HOSPADM

## 2025-08-06 RX ORDER — IBUPROFEN 600 MG/1
TABLET ORAL AS NEEDED
Status: DISCONTINUED | OUTPATIENT
Start: 2025-08-06 | End: 2025-08-06 | Stop reason: HOSPADM

## 2025-08-06 RX ORDER — PROPOFOL 10 MG/ML
INJECTION, EMULSION INTRAVENOUS AS NEEDED
Status: DISCONTINUED | OUTPATIENT
Start: 2025-08-06 | End: 2025-08-06 | Stop reason: SURG

## 2025-08-06 RX ADMIN — ROCURONIUM BROMIDE 50 MG: 10 INJECTION INTRAVENOUS at 14:27

## 2025-08-06 RX ADMIN — GLYCOPYRROLATE 0.1 MG: 0.2 INJECTION INTRAMUSCULAR; INTRAVENOUS at 15:07

## 2025-08-06 RX ADMIN — LIDOCAINE HYDROCHLORIDE 80 MG: 20 INJECTION, SOLUTION INFILTRATION; PERINEURAL at 14:27

## 2025-08-06 RX ADMIN — EPHEDRINE SULFATE 5 MG: 50 INJECTION INTRAMUSCULAR; INTRAVENOUS; SUBCUTANEOUS at 15:09

## 2025-08-06 RX ADMIN — DEXAMETHASONE SODIUM PHOSPHATE 8 MG: 4 INJECTION, SOLUTION INTRA-ARTICULAR; INTRALESIONAL; INTRAMUSCULAR; INTRAVENOUS; SOFT TISSUE at 14:35

## 2025-08-06 RX ADMIN — ONDANSETRON 4 MG: 2 INJECTION, SOLUTION INTRAMUSCULAR; INTRAVENOUS at 14:35

## 2025-08-06 RX ADMIN — PROPOFOL 200 MG: 10 INJECTION, EMULSION INTRAVENOUS at 14:27

## 2025-08-06 RX ADMIN — SODIUM CHLORIDE, SODIUM LACTATE, POTASSIUM CHLORIDE, AND CALCIUM CHLORIDE: 600; 310; 30; 20 INJECTION, SOLUTION INTRAVENOUS at 14:18

## 2025-08-06 RX ADMIN — SUGAMMADEX 314 MG: 100 INJECTION, SOLUTION INTRAVENOUS at 15:57

## 2025-08-06 RX ADMIN — SODIUM CHLORIDE, POTASSIUM CHLORIDE, SODIUM LACTATE AND CALCIUM CHLORIDE 1000 ML: 600; 310; 30; 20 INJECTION, SOLUTION INTRAVENOUS at 13:55

## 2025-08-06 RX ADMIN — FENTANYL CITRATE 50 MCG: 50 INJECTION, SOLUTION INTRAMUSCULAR; INTRAVENOUS at 14:02

## 2025-08-06 RX ADMIN — ROCURONIUM BROMIDE 10 MG: 10 INJECTION INTRAVENOUS at 15:15

## 2025-08-07 ENCOUNTER — OFFICE VISIT (OUTPATIENT)
Dept: CARDIOLOGY | Age: 65
End: 2025-08-07
Payer: MEDICARE

## 2025-08-07 VITALS
WEIGHT: 173 LBS | HEART RATE: 67 BPM | DIASTOLIC BLOOD PRESSURE: 84 MMHG | HEIGHT: 74 IN | SYSTOLIC BLOOD PRESSURE: 128 MMHG | BODY MASS INDEX: 22.2 KG/M2

## 2025-08-07 DIAGNOSIS — I48.0 PAROXYSMAL ATRIAL FIBRILLATION: Primary | ICD-10-CM

## 2025-08-07 DIAGNOSIS — C78.7 PANCREATIC CANCER METASTASIZED TO LIVER: ICD-10-CM

## 2025-08-07 DIAGNOSIS — C25.9 PANCREATIC CANCER METASTASIZED TO LIVER: ICD-10-CM

## 2025-08-07 DIAGNOSIS — I10 ESSENTIAL HYPERTENSION: ICD-10-CM

## 2025-08-07 DIAGNOSIS — E78.2 MIXED HYPERLIPIDEMIA: ICD-10-CM

## 2025-08-07 DIAGNOSIS — Z79.01 CHRONIC ANTICOAGULATION: ICD-10-CM

## 2025-08-07 DIAGNOSIS — I51.7 LVH (LEFT VENTRICULAR HYPERTROPHY): ICD-10-CM

## 2025-08-07 DIAGNOSIS — G47.33 OSA (OBSTRUCTIVE SLEEP APNEA): ICD-10-CM

## 2025-08-11 ENCOUNTER — READMISSION MANAGEMENT (OUTPATIENT)
Dept: CALL CENTER | Facility: HOSPITAL | Age: 65
End: 2025-08-11
Payer: MEDICARE

## 2025-08-12 ENCOUNTER — TRANSITIONAL CARE MANAGEMENT TELEPHONE ENCOUNTER (OUTPATIENT)
Dept: CALL CENTER | Facility: HOSPITAL | Age: 65
End: 2025-08-12
Payer: MEDICARE

## 2025-08-13 ENCOUNTER — TRANSITIONAL CARE MANAGEMENT TELEPHONE ENCOUNTER (OUTPATIENT)
Dept: CALL CENTER | Facility: HOSPITAL | Age: 65
End: 2025-08-13
Payer: MEDICARE

## 2025-08-15 LAB
BEAKER LAB AP INTRAOPERATIVE CONSULTATION: NORMAL
LAB AP CARIS, ADDENDUM: NORMAL
LAB AP CASE REPORT: NORMAL
LAB AP CYTOGENETICS REPORT,ADDENDUM: NORMAL
LAB AP FLOW CYTOMETRY REPORT,ADDENDUM: NORMAL
LAB AP SPECIAL STAINS: NORMAL
PATH REPORT.ADDENDUM SPEC: NORMAL
PATH REPORT.FINAL DX SPEC: NORMAL
PATH REPORT.GROSS SPEC: NORMAL

## 2025-08-27 RX ORDER — ROSUVASTATIN CALCIUM 20 MG/1
20 TABLET, COATED ORAL DAILY
Qty: 90 TABLET | Refills: 1 | Status: SHIPPED | OUTPATIENT
Start: 2025-08-27

## (undated) DEVICE — CANN O2 ETCO2 FITS ALL CONN CO2 SMPL A/ 7IN DISP LF

## (undated) DEVICE — ERBE NESSY®PLATE 170 SPLIT; 168CM²; CABLE 3M: Brand: ERBE

## (undated) DEVICE — THE CARR-LOCKE INJECTION NEEDLE IS A SINGLE USE, DISPOSABLE, FLEXIBLE SHEATH INJECTION NEEDLE USED FOR THE INJECTION OF VARIOUS TYPES OF MEDIA THROUGH FLEXIBLE ENDOSCOPES.

## (undated) DEVICE — SINGLE USE DISTAL COVER MAJ-2315: Brand: SINGLE USE DISTAL COVER

## (undated) DEVICE — KT ORCA ORCAPOD DISP STRL

## (undated) DEVICE — DEV LK WIREGUIDE FUSN OLYMP SCP

## (undated) DEVICE — LN SMPL CO2 SHTRM SD STREAM W/M LUER

## (undated) DEVICE — TUBING, SUCTION, 1/4" X 10', STRAIGHT: Brand: MEDLINE

## (undated) DEVICE — BLCK/BITE BLOX W/DENTL/RIM W/STRAP 54F

## (undated) DEVICE — TRIPLE LUMEN NEEDLE KNIFE: Brand: RX NEEDLE KNIFE XL

## (undated) DEVICE — Device

## (undated) DEVICE — ADAPT CLN BIOGUARD AIR/H2O DISP

## (undated) DEVICE — SPHINCTEROTOME: Brand: HYDRATOME RX 44

## (undated) DEVICE — SENSR O2 OXIMAX FNGR A/ 18IN NONSTR

## (undated) DEVICE — ENDOSCOPIC ULTRASOUND FINE NEEDLE BIOPSY (FNB) DEVICE: Brand: ACQUIRE™ S

## (undated) DEVICE — BITEBLOCK ENDO W/STRAP 60F A/ LF DISP

## (undated) DEVICE — PK ENDO GI 50

## (undated) DEVICE — SINGLE-USE BIOPSY FORCEPS: Brand: RADIAL JAW 4